# Patient Record
Sex: MALE | Race: WHITE
[De-identification: names, ages, dates, MRNs, and addresses within clinical notes are randomized per-mention and may not be internally consistent; named-entity substitution may affect disease eponyms.]

---

## 2019-12-24 ENCOUNTER — HOSPITAL ENCOUNTER (INPATIENT)
Dept: HOSPITAL 65 - ER | Age: 39
LOS: 3 days | Discharge: HOME | DRG: 286 | End: 2019-12-27
Attending: INTERNAL MEDICINE | Admitting: INTERNAL MEDICINE
Payer: COMMERCIAL

## 2019-12-24 VITALS — SYSTOLIC BLOOD PRESSURE: 105 MMHG | DIASTOLIC BLOOD PRESSURE: 69 MMHG

## 2019-12-24 VITALS — BODY MASS INDEX: 35.15 KG/M2 | WEIGHT: 245.5 LBS | HEIGHT: 70 IN

## 2019-12-24 VITALS — DIASTOLIC BLOOD PRESSURE: 62 MMHG | SYSTOLIC BLOOD PRESSURE: 133 MMHG

## 2019-12-24 VITALS — DIASTOLIC BLOOD PRESSURE: 90 MMHG | SYSTOLIC BLOOD PRESSURE: 119 MMHG

## 2019-12-24 VITALS — SYSTOLIC BLOOD PRESSURE: 127 MMHG | DIASTOLIC BLOOD PRESSURE: 87 MMHG

## 2019-12-24 VITALS — SYSTOLIC BLOOD PRESSURE: 133 MMHG | DIASTOLIC BLOOD PRESSURE: 92 MMHG

## 2019-12-24 VITALS — SYSTOLIC BLOOD PRESSURE: 104 MMHG | DIASTOLIC BLOOD PRESSURE: 70 MMHG

## 2019-12-24 VITALS — DIASTOLIC BLOOD PRESSURE: 70 MMHG | SYSTOLIC BLOOD PRESSURE: 110 MMHG

## 2019-12-24 VITALS — DIASTOLIC BLOOD PRESSURE: 86 MMHG | SYSTOLIC BLOOD PRESSURE: 113 MMHG

## 2019-12-24 VITALS — DIASTOLIC BLOOD PRESSURE: 56 MMHG | SYSTOLIC BLOOD PRESSURE: 120 MMHG

## 2019-12-24 VITALS — SYSTOLIC BLOOD PRESSURE: 149 MMHG | DIASTOLIC BLOOD PRESSURE: 100 MMHG

## 2019-12-24 VITALS — SYSTOLIC BLOOD PRESSURE: 122 MMHG | DIASTOLIC BLOOD PRESSURE: 87 MMHG

## 2019-12-24 VITALS — SYSTOLIC BLOOD PRESSURE: 126 MMHG | DIASTOLIC BLOOD PRESSURE: 81 MMHG

## 2019-12-24 VITALS — SYSTOLIC BLOOD PRESSURE: 117 MMHG | DIASTOLIC BLOOD PRESSURE: 74 MMHG

## 2019-12-24 VITALS — SYSTOLIC BLOOD PRESSURE: 130 MMHG | DIASTOLIC BLOOD PRESSURE: 76 MMHG

## 2019-12-24 VITALS — SYSTOLIC BLOOD PRESSURE: 137 MMHG | DIASTOLIC BLOOD PRESSURE: 93 MMHG

## 2019-12-24 VITALS — SYSTOLIC BLOOD PRESSURE: 106 MMHG | DIASTOLIC BLOOD PRESSURE: 47 MMHG

## 2019-12-24 VITALS — SYSTOLIC BLOOD PRESSURE: 118 MMHG | DIASTOLIC BLOOD PRESSURE: 84 MMHG

## 2019-12-24 VITALS — DIASTOLIC BLOOD PRESSURE: 81 MMHG | SYSTOLIC BLOOD PRESSURE: 163 MMHG

## 2019-12-24 VITALS — DIASTOLIC BLOOD PRESSURE: 92 MMHG | SYSTOLIC BLOOD PRESSURE: 137 MMHG

## 2019-12-24 VITALS — SYSTOLIC BLOOD PRESSURE: 136 MMHG | DIASTOLIC BLOOD PRESSURE: 78 MMHG

## 2019-12-24 VITALS — SYSTOLIC BLOOD PRESSURE: 130 MMHG | DIASTOLIC BLOOD PRESSURE: 68 MMHG

## 2019-12-24 VITALS — DIASTOLIC BLOOD PRESSURE: 66 MMHG | SYSTOLIC BLOOD PRESSURE: 105 MMHG

## 2019-12-24 VITALS — DIASTOLIC BLOOD PRESSURE: 75 MMHG | SYSTOLIC BLOOD PRESSURE: 109 MMHG

## 2019-12-24 VITALS — SYSTOLIC BLOOD PRESSURE: 122 MMHG | DIASTOLIC BLOOD PRESSURE: 85 MMHG

## 2019-12-24 VITALS — DIASTOLIC BLOOD PRESSURE: 67 MMHG | SYSTOLIC BLOOD PRESSURE: 120 MMHG

## 2019-12-24 VITALS — DIASTOLIC BLOOD PRESSURE: 75 MMHG | SYSTOLIC BLOOD PRESSURE: 129 MMHG

## 2019-12-24 VITALS — SYSTOLIC BLOOD PRESSURE: 105 MMHG | DIASTOLIC BLOOD PRESSURE: 66 MMHG

## 2019-12-24 VITALS — SYSTOLIC BLOOD PRESSURE: 123 MMHG | DIASTOLIC BLOOD PRESSURE: 80 MMHG

## 2019-12-24 VITALS — SYSTOLIC BLOOD PRESSURE: 172 MMHG | DIASTOLIC BLOOD PRESSURE: 89 MMHG

## 2019-12-24 DIAGNOSIS — F10.10: ICD-10-CM

## 2019-12-24 DIAGNOSIS — I42.6: ICD-10-CM

## 2019-12-24 DIAGNOSIS — Z79.01: ICD-10-CM

## 2019-12-24 DIAGNOSIS — I48.20: ICD-10-CM

## 2019-12-24 DIAGNOSIS — J18.9: ICD-10-CM

## 2019-12-24 DIAGNOSIS — I25.10: Primary | ICD-10-CM

## 2019-12-24 DIAGNOSIS — E87.6: ICD-10-CM

## 2019-12-24 DIAGNOSIS — I11.0: ICD-10-CM

## 2019-12-24 DIAGNOSIS — I07.1: ICD-10-CM

## 2019-12-24 DIAGNOSIS — Z91.14: ICD-10-CM

## 2019-12-24 DIAGNOSIS — E66.9: ICD-10-CM

## 2019-12-24 DIAGNOSIS — E78.5: ICD-10-CM

## 2019-12-24 DIAGNOSIS — I50.9: ICD-10-CM

## 2019-12-24 DIAGNOSIS — Z82.49: ICD-10-CM

## 2019-12-24 DIAGNOSIS — Z71.41: ICD-10-CM

## 2019-12-24 LAB
ALP INTEST CFR SERPL: 104 U/L (ref 50–136)
ALT SERPL-CCNC: 33 U/L (ref 12–78)
AST SERPL-CCNC: 24 U/L (ref 0–35)
BASOPHILS # BLD AUTO: 0 10^3/UL (ref 0–0.1)
BASOPHILS NFR BLD AUTO: 0.3 % (ref 0–0.2)
CALCIUM SERPL-MCNC: 9 MG/DL (ref 8.4–10.5)
CO2 BLDA-SCNC: 24.7 MMOL/L (ref 20–32)
EOSINOPHIL # BLD AUTO: 0 10^3/UL (ref 0–0.2)
EOSINOPHIL NFR BLD AUTO: 0.1 % (ref 0–5)
ERYTHROCYTE [DISTWIDTH] IN BLOOD BY AUTOMATED COUNT: 15 % (ref 11.5–14.5)
GLUCOSE PRE 100 G GLC PO SERPL-MCNC: 115 MG/DL (ref 70–110)
LYMPHOCYTES # BLD AUTO: 1.7 10^3/UL (ref 1–4.8)
LYMPHOCYTES NFR BLD AUTO: 12.6 % (ref 24–44)
MCH RBC QN AUTO: 32.8 PG (ref 26–34)
MONOCYTES # BLD AUTO: 1.6 10^3/UL (ref 0.3–0.8)
MONOCYTES NFR BLD AUTO: 11.9 % (ref 5–12)
NEUTROPHILS # BLD AUTO: 10.2 10^3/UL (ref 1.8–7.7)
NEUTROPHILS NFR BLD AUTO: 74.8 % (ref 41–85)
PLATELET # BLD AUTO: 212 10^3/UL (ref 150–400)

## 2019-12-24 PROCEDURE — 83735 ASSAY OF MAGNESIUM: CPT

## 2019-12-24 PROCEDURE — 85025 COMPLETE CBC W/AUTO DIFF WBC: CPT

## 2019-12-24 PROCEDURE — C1769 GUIDE WIRE: HCPCS

## 2019-12-24 PROCEDURE — 93306 TTE W/DOPPLER COMPLETE: CPT

## 2019-12-24 PROCEDURE — 93318 ECHO TRANSESOPHAGEAL INTRAOP: CPT

## 2019-12-24 PROCEDURE — 83880 ASSAY OF NATRIURETIC PEPTIDE: CPT

## 2019-12-24 PROCEDURE — 93005 ELECTROCARDIOGRAM TRACING: CPT

## 2019-12-24 PROCEDURE — 82553 CREATINE MB FRACTION: CPT

## 2019-12-24 PROCEDURE — 85379 FIBRIN DEGRADATION QUANT: CPT

## 2019-12-24 PROCEDURE — C1894 INTRO/SHEATH, NON-LASER: HCPCS

## 2019-12-24 PROCEDURE — 85027 COMPLETE CBC AUTOMATED: CPT

## 2019-12-24 PROCEDURE — 99152 MOD SED SAME PHYS/QHP 5/>YRS: CPT

## 2019-12-24 PROCEDURE — 71275 CT ANGIOGRAPHY CHEST: CPT

## 2019-12-24 PROCEDURE — 85730 THROMBOPLASTIN TIME PARTIAL: CPT

## 2019-12-24 PROCEDURE — 86677 HELICOBACTER PYLORI ANTIBODY: CPT

## 2019-12-24 PROCEDURE — 80053 COMPREHEN METABOLIC PANEL: CPT

## 2019-12-24 PROCEDURE — 84484 ASSAY OF TROPONIN QUANT: CPT

## 2019-12-24 PROCEDURE — 85610 PROTHROMBIN TIME: CPT

## 2019-12-24 PROCEDURE — 82550 ASSAY OF CK (CPK): CPT

## 2019-12-24 PROCEDURE — 99153 MOD SED SAME PHYS/QHP EA: CPT

## 2019-12-24 PROCEDURE — 36415 COLL VENOUS BLD VENIPUNCTURE: CPT

## 2019-12-24 PROCEDURE — 80048 BASIC METABOLIC PNL TOTAL CA: CPT

## 2019-12-24 PROCEDURE — 99285 EMERGENCY DEPT VISIT HI MDM: CPT

## 2019-12-24 PROCEDURE — 71045 X-RAY EXAM CHEST 1 VIEW: CPT

## 2019-12-24 PROCEDURE — 93458 L HRT ARTERY/VENTRICLE ANGIO: CPT

## 2019-12-24 PROCEDURE — C1887 CATHETER, GUIDING: HCPCS

## 2019-12-24 PROCEDURE — 87040 BLOOD CULTURE FOR BACTERIA: CPT

## 2019-12-24 RX ADMIN — CEFTRIAXONE SODIUM SCH MLS/HR: 1 INJECTION, POWDER, FOR SOLUTION INTRAMUSCULAR; INTRAVENOUS at 16:55

## 2019-12-24 NOTE — DIREP
PROCEDURE:CHEST 1 VIEW

 

COMPARISON:[facility], CR, XRAY CHEST SINGLE VW, 12/17/2016, 

09:18 AM.

 

INDICATIONS:hemoptysis, dyspnea

 

FINDINGS:

LUNGS/PLEURA:A right pleural effusion is present.  The left lung is clear.  No 

focal consolidation or pneumothorax otherwise noted.

VASCULATURE:Normal.  Unremarkable pulmonary vasculature.

CARDIAC:Borderline cardiomegaly.

MEDIASTINUM:Normal.  No visible mass or adenopathy. 

BONES:Mild degenerative change.

OTHER:Cardiac leads.

 

CONCLUSION:Right pleural effusion.

 

 

 

Dictated by: TANIKA Mccracken M.D. on 12/24/2019 at 01:06 PM     

Electronically Signed By: TANIKA Mccracken M.D. on 12/24/2019 at 01:07 PM

## 2019-12-24 NOTE — NUR
ARRIVAL

PATIENT ARRIVED TO UNIT VIA STRETCHER ON ROOM AIR AND CONNECTED TO PORTABLE TELEMONITOR. 
CARDIZEM DRIP AT 15MG/HR TO 20 GA TO RIGHT AC. PATIENT TRANSFERRED SELF INTO ICU BED. NO 
DISTRESS NOTED. PATIENT CONNECTED TO BEDSIDE MINDRAY, TELEMONITOR READING A FIB RATE . 
RALES HEARD THROUGHOUT LUNG FIELDS. PATIENT STATED COUGHING UP THICK SPUTUM.. DENIES NO 
CHEST PAIN. PATIENT ORIENTED TO ICU ROOM, ICU BED, AND CALL LIGHT. PATIENT INSTRUCTED TO 
CALL FOR ASSISTANCE WHEN UP TO AVOID TRIPPING. ADMISSION PACKET COMPLETED.

## 2019-12-24 NOTE — NUR
UPDATE

PATIENTS CARDIZEM INFUSING AT TITRATED UP TO 15MG/HR AT THIS TIME CONTINUES TO 
HAVE TACHYCARDIA WITH HR .  DR. KIMBROUGH NOTIFIED.  PATIENT DENIES ANY 
CHEST PAIN AT THIS TIME AND IS FEELING A LITTLE BETTER THAN WHEN HE CAME TO THE 
ER.

## 2019-12-24 NOTE — PCM.HP
HISTORY & PHYSICAL


HISTORY & PHYSICAL


DATE OF ADMISSION: 12/24/2019





CHIEF COMPLAINT: chest pain, sob





HISTORY OF PRESENT ILLNESS: 40 y/o male hx of A fib, presents with 1-2 days of 

worsening R lateral chest and back pain. 10/10, worse with deep breath, and 

fever/chills. Pt has not been taking medications because he says he can't afford

it (lost health insurance).








ALLERGIES: NKA





CURRENT MEDICATIONS: not taking them





PAST MEDICAL HISTORY: A fib





SOCIAL HISTORY: denies drugs, +etoh, +tobacco





FAMILY HISTORY: non contributory





REVIEW OF SYSTEMS: +fever, chills, and nausea. negative for all 11 point system 

except HPI.





PHYSICAL EXAMINATION: 





GENERAL: NAD, alert, oriented





VITAL SIGNS: 154/97 128 96% 16 afebrile





HEENT: normocephalic, thyroid smooth





LUNGS: decreased sounds on R base, no wheezes, symmetric excursions, non labored





HEART: irregular, no murmur





ABDOMEN: soft, obese, nontender, BS+





EXTREMITIES: no edema, or clubbing





NEUROLOGIC: CN II-XII intact, ENID





LABORATORY DATA: 














Test


 12/24/19


12:50


 


White Blood Count


 13.6 10^3/uL


(4.5-11.0)


 


Red Blood Count


 5.40 10^6/uL


(4.50-5.90)


 


Hemoglobin


 17.7 g/dL


(13.9-16.3)


 


Hematocrit


 53.0 %


(37.0-53.0)


 


Mean Corpuscular Volume


 98.1 fL


()


 


Mean Corpuscular Hemoglobin


 32.8 pg


(26-34)


 


Mean Corpuscular Hemoglobin


Concent 33.4 g/dL


(33-37)


 


Red Cell Distribution Width


 15.0 %


(11.5-14.5)


 


Platelet Count


 212 10^3/uL


(150-400)


 


Mean Platelet Volume


 9.5 fL


(7.8-11.0)


 


Neutrophils (%) (Auto)


 74.8 %


(41.0-85.0)


 


Lymphocytes (%) (Auto)


 12.6 %


(24.0-44.0)


 


Monocytes (%) (Auto)


 11.9 %


(5.0-12.0)


 


Neutrophils # (Auto)


 10.2 10^3/uL


(1.8-7.7)


 


Lymphocytes # (Auto)


 1.7 10^3/uL


(1.0-4.8)


 


Monocytes # (Auto)


 1.6 10^3/uL


(0.3-0.8)


 


Absolute Immature Granulocyte


(auto 0.04 10^3 u/L


(0-2)


 


Immature Granulocytes %


 0.30 %


(0.00-0.50)


 


Eosinophils %


 0.1 %


(0.0-5.0)


 


Basophils %


 0.3 %


(0.0-0.2)


 


Basophils #


 0.0 10^3/uL


(0.0-0.1)


 


Eosinophil Count


 0.0 10^3/uL


(0.0-0.2)


 


Prothrombin Time


 12.2 SEC


(9.4-11.5)


 


Prothrombin Time INR


(Non-Therap) 1.2 





 


Activated Partial


Thromboplast Time 27.1 SEC


(24.67-30.72)


 


D-Dimer


 1.92 mg/L


(0.19-0.49)


 


Sodium Level


 135 mmol/L


(132-145)


 


Potassium Level


 3.9 mmol/L


(3.6-5.2)


 


Chloride Level


 98.0 mmol/L


()


 


Carbon Dioxide Level


 24.7 mmol/L


(20.0-32)


 


Anion Gap 16.2 


 


Blood Urea Nitrogen 8 mg/dL (7-18) 


 


Creatinine


 1.20 mg/dL


(0.59-1.40)


 


Estimated GFR (


American) 81.6 (>/=60) 





 


BUN/Creatinine Ratio 6.0 


 


Glucose Level


 115 mg/dL


()


 


Calcium Level


 9.0 mg/dL


(8.4-10.5)


 


Total Bilirubin


 2.7 mg/dL


(0.2-1.0)


 


Aspartate Amino Transf


(AST/SGOT) 24 U/L (0-35) 





 


Alanine Aminotransferase


(ALT/SGPT) 33 U/L (12-78) 





 


Alkaline Phosphatase


 104 U/L


()


 


Total Creatine Kinase


 18 U/L


()


 


Creatine Kinase MB


 < 0.5 ng/mL


(0.5-3.6)


 


Troponin I


 < 0.02 ng/mL


(0.00-0.05)


 


Pro-B-Type Natriuretic Peptide


 6401 pg/mL


(0-125)


 


Total Protein


 7.2 g/dL


(6.4-8.2)


 


Albumin


 3.0 g/dL


(3.4-5.0)


 


Globulin 4.2 


 


Helicobacter pylori Screen


 NEGATIVE


(NEGATIVE)








IMPRESSION & PLAN:


 1) CAP - Rocephin & Zithromax


 2) A fib w/RVR - cardizem drip


3) DVT prophylaxis - SCDs











JOSR TROTTER DO               Dec 24, 2019 18:23

## 2019-12-24 NOTE — ER.PDOC
General


Chief Complaint:  Chest Pain-Cardiac Nature


Stated Complaint:  CHEST PAIN, FEVER, COUGH,


Time seen by MD:  16:00


Source:  patient





History of Present Illness


Initial Comments


H/O AFIB, NON COMPLIANT WITH MEDS


Timing/Duration:  24 hours


Severity/Quality:  moderate


Radiation:  no radiation


Activities at Onset:  emotional stress


Modifying Factors:  coughing, exercise


Nitro Today/Relief:  No Nitro Taken Today


Associated Symptoms:  cough, palpitations, shortness of breath


Prior symptoms/Treatment:  Similar symptoms previous


Allergies:  


Coded Allergies:  


     No Known Allergies (Unverified , 12/17/16)


Home Meds


Active Scripts


Aspirin (ASPIRIN EC) 81 Mg Tablet., 81 MG PO DAILY for 30 Days


   Prov:PEDRO LUIS ROBERSON MD         5/30/17


[Magnesium Oxide] 400 MG TABLET No Conflict Check, 400 MG PO BID for 30 Days


   Prov:PEDRO LUIS ROBERSON MD         5/30/17





Past Medical History


Medical History:  hypertension, other


Surgical History:  cardiac cath





Social History


Alcohol Use:  heavy


Drug Use:  none





Reviewed


Nursing Reviewed:  Vital Signs, Abn. Noted





All Other Systems:  Reviewed and Negative





Physical Exam


General Appearance:  No Apparent Distress, WD/WN


HEENT:  PERRL/EOMI, Normal ENT Inspection, TMs Normal, Pharynx Normal


Neck:  Non-Tender, Full Range of Motion, Supple, Normal Inspection


Respiratory:  rales


Cardiovascular:  Tachycardia, Irregularly Irregular


Gastrointestinal:  Normal Bowel Sounds, No Organomegaly, No Pulsatile Mass, Non 

Tender, Soft


Extremities:  Normal Range of Motion, Non-Tender, Normal Inspection, No Pedal 

Edema, No Calf Tenderness, Normal Capillary Refill


Neurologic/Psychiatric:  CNs II-XII NML as Tested, No Motor/Sensory Deficits, 

Alert, Normal Mood/Affect, Oriented x 3


Skin:  Normal Color, Warm/Dry





Results/Orders


Results/Orders





Orders - CINDY KIMBROUGH MD


Diltiazem Hcl (Cardizem) (12/24/19 12:50)


Diltiazem Hcl (Cardizem) (12/24/19 12:50)


Cbc With Auto Diff (12/24/19 12:52)


Comprehensive Metabolic Panel (12/24/19 12:52)


Creatine Kinase (12/24/19 12:52)


Creatine Kinase Mb (12/24/19 12:52)


Troponin I (12/24/19 12:52)


Probnp    B-Type Np (12/24/19 12:52)


PT (12/24/19 12:52)


Partial Thromboplastin Time. (12/24/19 12:52)


Helicobacter Pylori (12/24/19 12:52)


D-Dimer (12/24/19 12:52)


Xr Chest 1v (12/24/19 12:52)


Ekg-Routine (12/24/19 12:52)


Diltiazem Hcl (Cardizem) (12/24/19 12:53)


Diltiazem Hcl (Cardizem) (12/24/19 12:53)


Diltiazem Hcl (Cardizem) (12/24/19 13:14)


Diltiazem Hcl (Cardizem) (12/24/19 13:20)


Cta Chest (12/24/19 13:40)


Diltiazem Hcl (Cardizem) (12/24/19 13:48)


0.9 % Sodium Chloride (Ns 100ml) (12/24/19 13:49)


Diltiazem Hcl (Cardizem) (12/24/19 14:09)


Blood Culture (12/24/19 15:58)


Ceftriaxone Sodium (Rocephin) (12/24/19 16:00)





Vital Signs








  Date Time  Temp Pulse Resp B/P (MAP) Pulse Ox O2 Delivery O2 Flow Rate FiO2


 


12/24/19 14:10  136      


 


12/24/19 13:28  122 20 130/76 (94) 96 Room Air  


 


12/24/19 13:20  155      


 


12/24/19 13:10  148 20 129/75 (93) 97 Room Air  


 


12/24/19 13:00  171  172/89    


 


12/24/19 13:00  171      


 


12/24/19 12:50  187 18  98 Room Air  


 


12/24/19 12:31 99.3 60 18     


 


12/24/19 12:31 99.3 60 18 172/89 (116) 97 Room Air  


 


12/24/19 12:31 99.3 60 18  97   








Administered Medications








 Medications


  (Trade)  Dose


 Ordered  Sig/Foreign


 Route


 PRN Reason  Start Time


 Stop Time Status Last Admin


Dose Admin


 


 Diltiazem HCl


  (Cardizem)  20 mg  STAT  STAT


 IV


   12/24/19 12:53


 12/24/19 12:55 DC 12/24/19 13:00


20 MG


 


 Diltiazem HCl


  (Cardizem)  25 mg  STAT  STAT


 IV


   12/24/19 13:20


 12/24/19 13:30 DC 12/24/19 13:20


25 MG


 


 Diltiazem HCl


  (Cardizem)  30 mg  STAT  STAT


 PO


   12/24/19 12:53


 12/24/19 12:55 DC 12/24/19 13:00


30 MG


 


 Diltiazem HCl


  (Cardizem)  125 mg  STAT  STAT


 IV


   12/24/19 14:09


 12/24/19 14:11 DC 12/24/19 14:10


125 MG








                                Laboratory Tests








Test


 12/24/19


12:50


 


White Blood Count


 13.6 10^3/uL


(4.5-11.0)  H


 


Red Blood Count


 5.40 10^6/uL


(4.50-5.90)


 


Hemoglobin


 17.7 g/dL


(13.9-16.3)  H


 


Hematocrit


 53.0 %


(37.0-53.0)


 


Mean Corpuscular Volume


 98.1 fL


()


 


Mean Corpuscular Hemoglobin


 32.8 pg


(26-34)


 


Mean Corpuscular Hemoglobin


Concent 33.4 g/dL


(33-37)


 


Red Cell Distribution Width


 15.0 %


(11.5-14.5)  H


 


Platelet Count


 212 10^3/uL


(150-400)


 


Mean Platelet Volume


 9.5 fL


(7.8-11.0)


 


Neutrophils (%) (Auto)


 74.8 %


(41.0-85.0)


 


Lymphocytes (%) (Auto)


 12.6 %


(24.0-44.0)  L


 


Monocytes (%) (Auto)


 11.9 %


(5.0-12.0)


 


Neutrophils # (Auto)


 10.2 10^3/uL


(1.8-7.7)  H


 


Lymphocytes # (Auto)


 1.7 10^3/uL


(1.0-4.8)


 


Monocytes # (Auto)


 1.6 10^3/uL


(0.3-0.8)  H


 


Absolute Immature Granulocyte


(auto 0.04 10^3 u/L


(0-2)


 


Immature Granulocytes %


 0.30 %


(0.00-0.50)


 


Eosinophils %


 0.1 %


(0.0-5.0)


 


Basophils %


 0.3 %


(0.0-0.2)  H


 


Basophils #


 0.0 10^3/uL


(0.0-0.1)


 


Eosinophil Count


 0.0 10^3/uL


(0.0-0.2)


 


Prothrombin Time


 12.2 SEC


(9.4-11.5)  H


 


Prothrombin Time INR


(Non-Therap) 1.2  





 


Activated Partial


Thromboplast Time 27.1 SEC


(24.67-30.72)


 


D-Dimer


 1.92 mg/L


(0.19-0.49)  *H


 


Sodium Level


 135 mmol/L


(132-145)


 


Potassium Level


 3.9 mmol/L


(3.6-5.2)


 


Chloride Level


 98.0 mmol/L


()


 


Carbon Dioxide Level


 24.7 mmol/L


(20.0-32)


 


Anion Gap 16.2  


 


Blood Urea Nitrogen


 8 mg/dL (7-18)





 


Creatinine


 1.20 mg/dL


(0.59-1.40)


 


Estimated GFR (


American) 81.6 (>/=60)  





 


BUN/Creatinine Ratio 6.0  


 


Glucose Level


 115 mg/dL


()  H


 


Calcium Level


 9.0 mg/dL


(8.4-10.5)


 


Total Bilirubin


 2.7 mg/dL


(0.2-1.0)  H


 


Aspartate Amino Transferase


(AST) 24 U/L (0-35)  





 


Alanine Aminotransferase (ALT)


 33 U/L (12-78)





 


Alkaline Phosphatase


 104 U/L


()


 


Total Creatine Kinase


 18 U/L


()  L


 


Creatine Kinase MB


 < 0.5 ng/mL


(0.5-3.6)  L


 


Troponin I


 < 0.02 ng/mL


(0.00-0.05)


 


Pro-B-Type Natriuretic Peptide


 6401 pg/mL


(0-125)  H


 


Total Protein


 7.2 g/dL


(6.4-8.2)


 


Albumin


 3.0 g/dL


(3.4-5.0)  L


 


Globulin 4.2  


 


Helicobacter pylori Screen


 NEGATIVE


(NEGATIVE)











EKG/XRAY/CT/US


EKG Comments:  AFIB, RVR





Consult/PCP


Time Consult/PCP Called:  14:55


Consult/PCP:  DR TROTTER





Departure


Time of Disposition:  17:00


Disposition:  09 ADMITTED AS INPATIENT


Impression:  


   Primary Impression:  


   A-fib


   Additional Impression:  


   Pneumonia


Condition:  Improved


Referrals:  


PCP,UNKNOWN (PCP)


PRIMARY CARE PROVIDER


Duration or Time Spent with Pa:  20m





Problem Qualifiers











LUIS E,CINDY S MD              Dec 24, 2019 16:32

## 2019-12-24 NOTE — DIREP
PROCEDURE:CT PULMONARY ANGIOGRAM

 

TECHNIQUE:Following the intravenous administration of contrast material, axial 

cuts were obtained through the chest.  Sagittal and coronal MIP post-processing 

reconstructions are provided.  Satisfactory pulmonary arterial contrast 

opacification was achieved.  The images were viewed at lung and soft tissue 

settings.   The study was reviewed on abdominal, lung, liver and bone windows 

PICC

 

COMPARISON:[facility], , XRAY CHEST SINGLE VW, 12/24/2019, 

12:44 PM.

 

INDICATIONS:R/O PE, DYSPNEA, NEW ONSET AFIB

 

FINDINGS:

PULMONARY ARTERIES:No evidence for pulmonary embolism is seen.  The CT 

venogram was not performed.

LUNGS:Extensive right lower lobe pneumonia with a moderate-sized right-sided 

pleural effusion noted.

CARDIAC:Normal size heart and normal pulmonary vascularity. 

THYROID:Normal.

THORACIC AORTA:No aortic aneurysm or dissection is seen.  The opacifications 

of aorta is slightly less than optimal.

MEDIASTINUM:Small lymph nodes identified in the right hilum, and in the 

para-aortic region.

PLEURA:Normal.

BONES:Mild degenerative changes in the spine.

OTHER:No additional findings. 

 

CONCLUSION:

 

No evidence for pulmonary embolism.  The CT venogram was not performed.

 

No aortic aneurysm or dissection is seen.  The opacifications of aorta is 

slightly less than optimal.

 

Extensive right lower lobe pneumonia with a moderate right-sided effusion and 

lymph nodes identified in the right hilum, para-aortic region.

 

 

 

 

 

Dictated by: Khris Phoenix MD on 12/24/2019 at 02:15 PM     

Electronically Signed By: Khris Phoenix MD on 12/24/2019 at 02:26 PM

## 2019-12-25 VITALS — SYSTOLIC BLOOD PRESSURE: 104 MMHG | DIASTOLIC BLOOD PRESSURE: 54 MMHG

## 2019-12-25 VITALS — SYSTOLIC BLOOD PRESSURE: 130 MMHG | DIASTOLIC BLOOD PRESSURE: 56 MMHG

## 2019-12-25 VITALS — DIASTOLIC BLOOD PRESSURE: 80 MMHG | SYSTOLIC BLOOD PRESSURE: 121 MMHG

## 2019-12-25 VITALS — SYSTOLIC BLOOD PRESSURE: 112 MMHG | DIASTOLIC BLOOD PRESSURE: 72 MMHG

## 2019-12-25 VITALS — DIASTOLIC BLOOD PRESSURE: 86 MMHG | SYSTOLIC BLOOD PRESSURE: 136 MMHG

## 2019-12-25 VITALS — DIASTOLIC BLOOD PRESSURE: 102 MMHG | SYSTOLIC BLOOD PRESSURE: 121 MMHG

## 2019-12-25 VITALS — DIASTOLIC BLOOD PRESSURE: 76 MMHG | SYSTOLIC BLOOD PRESSURE: 129 MMHG

## 2019-12-25 VITALS — SYSTOLIC BLOOD PRESSURE: 145 MMHG | DIASTOLIC BLOOD PRESSURE: 79 MMHG

## 2019-12-25 VITALS — SYSTOLIC BLOOD PRESSURE: 137 MMHG | DIASTOLIC BLOOD PRESSURE: 93 MMHG

## 2019-12-25 VITALS — DIASTOLIC BLOOD PRESSURE: 98 MMHG | SYSTOLIC BLOOD PRESSURE: 131 MMHG

## 2019-12-25 VITALS — SYSTOLIC BLOOD PRESSURE: 141 MMHG | DIASTOLIC BLOOD PRESSURE: 86 MMHG

## 2019-12-25 VITALS — DIASTOLIC BLOOD PRESSURE: 76 MMHG | SYSTOLIC BLOOD PRESSURE: 140 MMHG

## 2019-12-25 VITALS — SYSTOLIC BLOOD PRESSURE: 154 MMHG | DIASTOLIC BLOOD PRESSURE: 86 MMHG

## 2019-12-25 VITALS — DIASTOLIC BLOOD PRESSURE: 85 MMHG | SYSTOLIC BLOOD PRESSURE: 124 MMHG

## 2019-12-25 VITALS — SYSTOLIC BLOOD PRESSURE: 125 MMHG | DIASTOLIC BLOOD PRESSURE: 79 MMHG

## 2019-12-25 VITALS — DIASTOLIC BLOOD PRESSURE: 105 MMHG | SYSTOLIC BLOOD PRESSURE: 146 MMHG

## 2019-12-25 VITALS — SYSTOLIC BLOOD PRESSURE: 144 MMHG | DIASTOLIC BLOOD PRESSURE: 100 MMHG

## 2019-12-25 VITALS — DIASTOLIC BLOOD PRESSURE: 85 MMHG | SYSTOLIC BLOOD PRESSURE: 134 MMHG

## 2019-12-25 VITALS — DIASTOLIC BLOOD PRESSURE: 93 MMHG | SYSTOLIC BLOOD PRESSURE: 137 MMHG

## 2019-12-25 VITALS — SYSTOLIC BLOOD PRESSURE: 154 MMHG | DIASTOLIC BLOOD PRESSURE: 71 MMHG

## 2019-12-25 VITALS — SYSTOLIC BLOOD PRESSURE: 128 MMHG | DIASTOLIC BLOOD PRESSURE: 82 MMHG

## 2019-12-25 VITALS — SYSTOLIC BLOOD PRESSURE: 131 MMHG | DIASTOLIC BLOOD PRESSURE: 73 MMHG

## 2019-12-25 VITALS — DIASTOLIC BLOOD PRESSURE: 78 MMHG | SYSTOLIC BLOOD PRESSURE: 123 MMHG

## 2019-12-25 VITALS — SYSTOLIC BLOOD PRESSURE: 147 MMHG | DIASTOLIC BLOOD PRESSURE: 105 MMHG

## 2019-12-25 VITALS — SYSTOLIC BLOOD PRESSURE: 149 MMHG | DIASTOLIC BLOOD PRESSURE: 97 MMHG

## 2019-12-25 VITALS — SYSTOLIC BLOOD PRESSURE: 96 MMHG | DIASTOLIC BLOOD PRESSURE: 65 MMHG

## 2019-12-25 VITALS — DIASTOLIC BLOOD PRESSURE: 116 MMHG | SYSTOLIC BLOOD PRESSURE: 169 MMHG

## 2019-12-25 VITALS — DIASTOLIC BLOOD PRESSURE: 88 MMHG | SYSTOLIC BLOOD PRESSURE: 149 MMHG

## 2019-12-25 VITALS — DIASTOLIC BLOOD PRESSURE: 94 MMHG | SYSTOLIC BLOOD PRESSURE: 146 MMHG

## 2019-12-25 VITALS — SYSTOLIC BLOOD PRESSURE: 144 MMHG | DIASTOLIC BLOOD PRESSURE: 82 MMHG

## 2019-12-25 VITALS — SYSTOLIC BLOOD PRESSURE: 135 MMHG | DIASTOLIC BLOOD PRESSURE: 91 MMHG

## 2019-12-25 VITALS — SYSTOLIC BLOOD PRESSURE: 124 MMHG | DIASTOLIC BLOOD PRESSURE: 73 MMHG

## 2019-12-25 VITALS — DIASTOLIC BLOOD PRESSURE: 83 MMHG | SYSTOLIC BLOOD PRESSURE: 133 MMHG

## 2019-12-25 VITALS — DIASTOLIC BLOOD PRESSURE: 86 MMHG | SYSTOLIC BLOOD PRESSURE: 135 MMHG

## 2019-12-25 VITALS — DIASTOLIC BLOOD PRESSURE: 83 MMHG | SYSTOLIC BLOOD PRESSURE: 138 MMHG

## 2019-12-25 VITALS — DIASTOLIC BLOOD PRESSURE: 97 MMHG | SYSTOLIC BLOOD PRESSURE: 159 MMHG

## 2019-12-25 VITALS — DIASTOLIC BLOOD PRESSURE: 81 MMHG | SYSTOLIC BLOOD PRESSURE: 120 MMHG

## 2019-12-25 VITALS — DIASTOLIC BLOOD PRESSURE: 81 MMHG | SYSTOLIC BLOOD PRESSURE: 133 MMHG

## 2019-12-25 VITALS — SYSTOLIC BLOOD PRESSURE: 141 MMHG | DIASTOLIC BLOOD PRESSURE: 66 MMHG

## 2019-12-25 VITALS — DIASTOLIC BLOOD PRESSURE: 96 MMHG | SYSTOLIC BLOOD PRESSURE: 133 MMHG

## 2019-12-25 VITALS — DIASTOLIC BLOOD PRESSURE: 86 MMHG | SYSTOLIC BLOOD PRESSURE: 142 MMHG

## 2019-12-25 VITALS — DIASTOLIC BLOOD PRESSURE: 52 MMHG | SYSTOLIC BLOOD PRESSURE: 147 MMHG

## 2019-12-25 VITALS — DIASTOLIC BLOOD PRESSURE: 58 MMHG | SYSTOLIC BLOOD PRESSURE: 161 MMHG

## 2019-12-25 VITALS — SYSTOLIC BLOOD PRESSURE: 123 MMHG | DIASTOLIC BLOOD PRESSURE: 87 MMHG

## 2019-12-25 VITALS — DIASTOLIC BLOOD PRESSURE: 50 MMHG | SYSTOLIC BLOOD PRESSURE: 132 MMHG

## 2019-12-25 VITALS — DIASTOLIC BLOOD PRESSURE: 97 MMHG | SYSTOLIC BLOOD PRESSURE: 152 MMHG

## 2019-12-25 VITALS — SYSTOLIC BLOOD PRESSURE: 132 MMHG | DIASTOLIC BLOOD PRESSURE: 68 MMHG

## 2019-12-25 VITALS — DIASTOLIC BLOOD PRESSURE: 59 MMHG | SYSTOLIC BLOOD PRESSURE: 132 MMHG

## 2019-12-25 VITALS — DIASTOLIC BLOOD PRESSURE: 91 MMHG | SYSTOLIC BLOOD PRESSURE: 134 MMHG

## 2019-12-25 VITALS — DIASTOLIC BLOOD PRESSURE: 88 MMHG | SYSTOLIC BLOOD PRESSURE: 133 MMHG

## 2019-12-25 VITALS — SYSTOLIC BLOOD PRESSURE: 146 MMHG | DIASTOLIC BLOOD PRESSURE: 85 MMHG

## 2019-12-25 VITALS — SYSTOLIC BLOOD PRESSURE: 147 MMHG | DIASTOLIC BLOOD PRESSURE: 78 MMHG

## 2019-12-25 VITALS — SYSTOLIC BLOOD PRESSURE: 127 MMHG | DIASTOLIC BLOOD PRESSURE: 81 MMHG

## 2019-12-25 VITALS — DIASTOLIC BLOOD PRESSURE: 77 MMHG | SYSTOLIC BLOOD PRESSURE: 125 MMHG

## 2019-12-25 VITALS — DIASTOLIC BLOOD PRESSURE: 46 MMHG | SYSTOLIC BLOOD PRESSURE: 114 MMHG

## 2019-12-25 VITALS — SYSTOLIC BLOOD PRESSURE: 118 MMHG | DIASTOLIC BLOOD PRESSURE: 63 MMHG

## 2019-12-25 VITALS — SYSTOLIC BLOOD PRESSURE: 136 MMHG | DIASTOLIC BLOOD PRESSURE: 82 MMHG

## 2019-12-25 VITALS — DIASTOLIC BLOOD PRESSURE: 100 MMHG | SYSTOLIC BLOOD PRESSURE: 121 MMHG

## 2019-12-25 VITALS — DIASTOLIC BLOOD PRESSURE: 93 MMHG | SYSTOLIC BLOOD PRESSURE: 127 MMHG

## 2019-12-25 VITALS — DIASTOLIC BLOOD PRESSURE: 81 MMHG | SYSTOLIC BLOOD PRESSURE: 134 MMHG

## 2019-12-25 VITALS — SYSTOLIC BLOOD PRESSURE: 108 MMHG | DIASTOLIC BLOOD PRESSURE: 70 MMHG

## 2019-12-25 VITALS — DIASTOLIC BLOOD PRESSURE: 72 MMHG | SYSTOLIC BLOOD PRESSURE: 142 MMHG

## 2019-12-25 VITALS — DIASTOLIC BLOOD PRESSURE: 90 MMHG | SYSTOLIC BLOOD PRESSURE: 146 MMHG

## 2019-12-25 VITALS — SYSTOLIC BLOOD PRESSURE: 142 MMHG | DIASTOLIC BLOOD PRESSURE: 108 MMHG

## 2019-12-25 VITALS — DIASTOLIC BLOOD PRESSURE: 92 MMHG | SYSTOLIC BLOOD PRESSURE: 148 MMHG

## 2019-12-25 VITALS — DIASTOLIC BLOOD PRESSURE: 67 MMHG | SYSTOLIC BLOOD PRESSURE: 118 MMHG

## 2019-12-25 VITALS — DIASTOLIC BLOOD PRESSURE: 95 MMHG | SYSTOLIC BLOOD PRESSURE: 171 MMHG

## 2019-12-25 VITALS — DIASTOLIC BLOOD PRESSURE: 85 MMHG | SYSTOLIC BLOOD PRESSURE: 129 MMHG

## 2019-12-25 VITALS — SYSTOLIC BLOOD PRESSURE: 147 MMHG | DIASTOLIC BLOOD PRESSURE: 76 MMHG

## 2019-12-25 VITALS — SYSTOLIC BLOOD PRESSURE: 130 MMHG | DIASTOLIC BLOOD PRESSURE: 78 MMHG

## 2019-12-25 VITALS — DIASTOLIC BLOOD PRESSURE: 96 MMHG | SYSTOLIC BLOOD PRESSURE: 135 MMHG

## 2019-12-25 VITALS — SYSTOLIC BLOOD PRESSURE: 125 MMHG | DIASTOLIC BLOOD PRESSURE: 81 MMHG

## 2019-12-25 VITALS — SYSTOLIC BLOOD PRESSURE: 146 MMHG | DIASTOLIC BLOOD PRESSURE: 80 MMHG

## 2019-12-25 VITALS — DIASTOLIC BLOOD PRESSURE: 85 MMHG | SYSTOLIC BLOOD PRESSURE: 139 MMHG

## 2019-12-25 VITALS — SYSTOLIC BLOOD PRESSURE: 129 MMHG | DIASTOLIC BLOOD PRESSURE: 95 MMHG

## 2019-12-25 VITALS — DIASTOLIC BLOOD PRESSURE: 57 MMHG | SYSTOLIC BLOOD PRESSURE: 148 MMHG

## 2019-12-25 VITALS — DIASTOLIC BLOOD PRESSURE: 84 MMHG | SYSTOLIC BLOOD PRESSURE: 121 MMHG

## 2019-12-25 VITALS — SYSTOLIC BLOOD PRESSURE: 133 MMHG | DIASTOLIC BLOOD PRESSURE: 89 MMHG

## 2019-12-25 VITALS — DIASTOLIC BLOOD PRESSURE: 88 MMHG | SYSTOLIC BLOOD PRESSURE: 132 MMHG

## 2019-12-25 VITALS — DIASTOLIC BLOOD PRESSURE: 79 MMHG | SYSTOLIC BLOOD PRESSURE: 137 MMHG

## 2019-12-25 VITALS — SYSTOLIC BLOOD PRESSURE: 124 MMHG | DIASTOLIC BLOOD PRESSURE: 84 MMHG

## 2019-12-25 VITALS — DIASTOLIC BLOOD PRESSURE: 73 MMHG | SYSTOLIC BLOOD PRESSURE: 129 MMHG

## 2019-12-25 VITALS — DIASTOLIC BLOOD PRESSURE: 103 MMHG | SYSTOLIC BLOOD PRESSURE: 158 MMHG

## 2019-12-25 VITALS — DIASTOLIC BLOOD PRESSURE: 78 MMHG | SYSTOLIC BLOOD PRESSURE: 131 MMHG

## 2019-12-25 VITALS — SYSTOLIC BLOOD PRESSURE: 127 MMHG | DIASTOLIC BLOOD PRESSURE: 98 MMHG

## 2019-12-25 VITALS — SYSTOLIC BLOOD PRESSURE: 147 MMHG | DIASTOLIC BLOOD PRESSURE: 87 MMHG

## 2019-12-25 VITALS — SYSTOLIC BLOOD PRESSURE: 146 MMHG | DIASTOLIC BLOOD PRESSURE: 107 MMHG

## 2019-12-25 VITALS — DIASTOLIC BLOOD PRESSURE: 87 MMHG | SYSTOLIC BLOOD PRESSURE: 134 MMHG

## 2019-12-25 VITALS — SYSTOLIC BLOOD PRESSURE: 132 MMHG | DIASTOLIC BLOOD PRESSURE: 89 MMHG

## 2019-12-25 VITALS — SYSTOLIC BLOOD PRESSURE: 128 MMHG | DIASTOLIC BLOOD PRESSURE: 84 MMHG

## 2019-12-25 VITALS — SYSTOLIC BLOOD PRESSURE: 135 MMHG | DIASTOLIC BLOOD PRESSURE: 85 MMHG

## 2019-12-25 VITALS — SYSTOLIC BLOOD PRESSURE: 146 MMHG | DIASTOLIC BLOOD PRESSURE: 90 MMHG

## 2019-12-25 LAB
BASOPHILS # BLD AUTO: 0 10^3/UL (ref 0–0.1)
BASOPHILS NFR BLD AUTO: 0.3 % (ref 0–0.2)
CALCIUM SERPL-MCNC: 8.7 MG/DL (ref 8.4–10.5)
CALCIUM SERPL-MCNC: 9.2 MG/DL (ref 8.4–10.5)
CO2 BLDA-SCNC: 22.3 MMOL/L (ref 20–32)
CO2 BLDA-SCNC: 26.2 MMOL/L (ref 20–32)
EOSINOPHIL # BLD AUTO: 0 10^3/UL (ref 0–0.2)
EOSINOPHIL NFR BLD AUTO: 0.1 % (ref 0–5)
ERYTHROCYTE [DISTWIDTH] IN BLOOD BY AUTOMATED COUNT: 14.9 % (ref 11.5–14.5)
ERYTHROCYTE [DISTWIDTH] IN BLOOD BY AUTOMATED COUNT: 15 % (ref 11.5–14.5)
GLUCOSE PRE 100 G GLC PO SERPL-MCNC: 111 MG/DL (ref 70–110)
GLUCOSE PRE 100 G GLC PO SERPL-MCNC: 134 MG/DL (ref 70–110)
LYMPHOCYTES # BLD AUTO: 1.3 10^3/UL (ref 1–4.8)
LYMPHOCYTES NFR BLD AUTO: 11.8 % (ref 24–44)
MCH RBC QN AUTO: 32.5 PG (ref 26–34)
MCH RBC QN AUTO: 32.9 PG (ref 26–34)
MONOCYTES # BLD AUTO: 1 10^3/UL (ref 0.3–0.8)
MONOCYTES NFR BLD AUTO: 9.5 % (ref 5–12)
NEUTROPHILS # BLD AUTO: 8.4 10^3/UL (ref 1.8–7.7)
NEUTROPHILS NFR BLD AUTO: 78 % (ref 41–85)
PLATELET # BLD AUTO: 189 10^3/UL (ref 150–400)
PLATELET # BLD AUTO: 207 10^3/UL (ref 150–400)

## 2019-12-25 RX ADMIN — AZITHROMYCIN MONOHYDRATE SCH MLS/HR: 500 INJECTION, POWDER, LYOPHILIZED, FOR SOLUTION INTRAVENOUS at 21:40

## 2019-12-25 RX ADMIN — SODIUM CHLORIDE SCH MLS/HR: 0.9 INJECTION, SOLUTION INTRAVENOUS at 08:45

## 2019-12-25 RX ADMIN — FUROSEMIDE SCH MG: 10 INJECTION INTRAVENOUS at 11:30

## 2019-12-25 RX ADMIN — CARVEDILOL SCH MG: 3.12 TABLET, FILM COATED ORAL at 11:00

## 2019-12-25 RX ADMIN — CARVEDILOL SCH MG: 3.12 TABLET, FILM COATED ORAL at 21:00

## 2019-12-25 RX ADMIN — ASPIRIN TAB DELAYED RELEASE 81 MG SCH MG: 81 TABLET DELAYED RESPONSE at 08:43

## 2019-12-25 RX ADMIN — CEFTRIAXONE SODIUM SCH MLS/HR: 1 INJECTION, POWDER, FOR SOLUTION INTRAMUSCULAR; INTRAVENOUS at 15:27

## 2019-12-25 RX ADMIN — ENOXAPARIN SODIUM SCH MG: 40 INJECTION SUBCUTANEOUS at 21:00

## 2019-12-25 RX ADMIN — LISINOPRIL SCH MG: 20 TABLET ORAL at 17:56

## 2019-12-25 NOTE — CNH
DATE OF CONSULTATION:  12/25/2019



REASON FOR CONSULTATION:  Atrial fibrillation with rapid ventricular response.



HISTORY OF PRESENT ILLNESS:  This is a 39-year-old male who presented to the

Emergency Department of [facility] with symptoms of chest

pain, which he describes as right sided chest pain, worse with deep breaths with

associated chills.  On presentation to the ED, EKG done shows atrial

fibrillation with rapid ventricular response.  He has a history of atrial

fibrillation.  He was started on Cardizem drip for rate control.  He has a 
history of hypertension.

 His CHADS-VASc score is 1.  EKG shows

atrial fibrillation with rapid ventricular response.  Chest CTA that was done on

admission revealed extensive right lower lobe pneumonia with moderate right

sided pleural effusion.  Troponins were noted to be negative.



PAST MEDICAL HISTORY:  He has a past medical history

of hypertension.  History of atrial fibrillation as well.



PAST SURGICAL HISTORY:  He denies any past surgical history.



ALLERGIES:  He has no known drug allergies.



SOCIAL HISTORY:  He denies illicit drug use, but admits to excessive alcohol

intake.  Drinks whiskey as well as excessive tobacco use.



FAMILY HISTORY:  Admits to family history of premature CAD.  Denies sudden

cardiac death.



MEDICATIONS:  He states that he has not been taking his home medications due to

financial issues.



REVIEW OF SYSTEMS:  As per HPI and as per previous records.  All systems

reviewed and negative for interval change.



PHYSICAL EXAMINATION:

VITAL SIGNS:  Blood pressure is 149/88, respiratory rate is 18, pulse is 118,

pulse ox is 96% on room air.

GENERAL:  He is in no apparent distress, alert and oriented x 3.

HEENT:  Normocephalic, atraumatic.  Extraocular muscles intact.  Pupils are

equally round, reactive to light and accommodation.

HEART:  S1, S2 irregular, positive +3/6 holosystolic murmur.  No gallops, rubs 
or

clicks.

LUNGS:  Decreased breath sounds bilaterally.

ABDOMEN:  Obese, soft, nontender, nondistended.

EXTREMITIES:  No cyanosis, no clubbing, no edema.

NEUROLOGIC:  No neurological deficits.  Sensation is intact.



IMPRESSION:

1.  Atrial fibrillation with rapid ventricular response with a CHADS-VASc score

of 1 -- hypertension.

2.  Severe tricuspid regurgitation seen on 2D echo done this admission.

3.  Left ventricular ejection fraction of 25-30%, seen on 2D echo done on this

admission.

4.  New cardiomyopathy.

5.  Hypertension.

6.  History of atrial fibrillation.

7.  Community-acquired pneumonia.

8.  Moderate size right sided pleural effusion seen on CTA chest.



RECOMMENDATION:  This is a 39-year-old male who is presenting with atrial

fibrillation with rapid ventricular response.  He has a CHADS-VASc score of 1. 

At this time, we will continue with the Cardizem drip for rate control to

maintain heart rate less than 110.  He is not a candidate for oral

anticoagulation with a low CHADS-VASc score.  His 2D echo revealed left

ventricular ejection fraction of 25-30% with severe tricuspid regurgitation. 

Given his new cardiomyopathy in the setting of severe valvuloplasty, he is going

to be set up for left heart catheterization tomorrow to rule out obstructive

coronary artery disease.  We would also set him up for a transesophageal

echocardiogram to further evaluate his valvular disease.  He is going to be

n.p.o. after midnight for the procedure set up for tomorrow.  In the meantime,

we will continue to monitor his blood pressure and his heart rate.  We will

start him on oral cardiac medications including Coreg 3.125 p.o. b.i.d. as well

as lisinopril 20 p.o. daily.  Would also put him on Lasix 40 mg IV daily.  Given

his low ejection fraction, he certainly would benefit from a LifeVest before

discharge.  I will place a consult out to the  to arrange for a

wearable cardioverter device (LifeVest).  He also does have some financial

issues and so we would arrange for the  to address financial issues

he may have.  He is going to need a repeat 2D echo to

reevaluate his LVEF after 3 months of medication therapy.  If his left

ventricular ejection fraction remains equal to or less than 35% in 3 months'

time, he will be set up for AICD implantation at that time.  We will continue to

monitor him on telemetry.  Agree with antibiotics for treatment of pneumonia. 

Further recommendations will be made based on his overall clinical course.





______________________________

BROOKLYN MCGRATH D.O.



DR:  AVA/rocky  JOB# [id number]  [id number]

DD:  12/25/2019 12:53  DT:  12/25/2019 13:31

LUPE

## 2019-12-25 NOTE — NUR
Dr. Zhou Epperson at bedside discussing plan of care with patient. New orders received for HERMAN and 
cardiac catheterization tomorrow morning, Coreg 3.125mg po BID first dose now, Lisinopril 
20mg po daily and to administer this afternoon, lasix 40mg IV daily and to wean down 
cardizem by 5mg as tolerated for a heart rate less than 110. Pt will need life vest at 
discharge, case management consulted. RBVO.

## 2019-12-25 NOTE — NUR
APNEA- OCCASIONAL 5 SECOND INTERVALS OF APNEA NOTED WHEN SN AT BEDSIDE.  PATIENT IS SNORING 
AND WILL DESAT TO MID 80'S, THEN HAVE A COUGHING SPELL AND WILL BE BACK UP TO MID 90'S.

## 2019-12-25 NOTE — NUR
CLARIFICATION ORDER- CARDIZEM GTT IS A CONTINUOUS INFUSION.  SPOKE WITH CN, BECAUSE ER PUT 
IT IN AS A ONE TIME ORDER.  HOUSE SUPERVISOR PULLED CARDIZEM VIAL FROM PHARMACY.  SN 
INFORMED THAT PHARMACY WILL FIX THIS ORDER.  NEW BAG HUNG.

## 2019-12-25 NOTE — PRM.PN
Progress Note


Subjective


Date:  Dec 25, 2019


Time:  07:00


Physician Notes:


Pt states he feels better, less chest pain, and reduced SOB. No acute events 

overnight, except periods of apnea and decreased sats observed





Objective


Review IO, Exams,& Results





Vital Signs








  Date Time  Temp Pulse Resp B/P (MAP) Pulse Ox O2 Delivery O2 Flow Rate FiO2


 


12/25/19 08:24      Room Air  


 


12/25/19 06:15  117 14 137/79 (98) 93   


 


12/25/19 04:00 98.8       














Intake and Output 


 


 12/25/19





 07:00


 


Intake Total 535 ml


 


Balance 535 ml


 


 


 


Intake Oral 240 ml


 


Blood Product IV Normal Saline Flush 20 ml


 


Other 275 ml


 


# Voids 1








Laboratory Tests








Test


 12/24/19


12:50 12/25/19


05:16


 


White Blood Count 13.6 10^3/uL  12.6 10^3/uL 


 


Red Blood Count 5.40 10^6/uL  4.86 10^6/uL 


 


Hemoglobin 17.7 g/dL  15.8 g/dL 


 


Hematocrit 53.0 %  47.6 % 


 


Mean Corpuscular Volume 98.1 fL  97.9 fL 


 


Mean Corpuscular Hemoglobin 32.8 pg  32.5 pg 


 


Mean Corpuscular Hemoglobin


Concent 33.4 g/dL 


 33.2 g/dL 





 


Red Cell Distribution Width 15.0 %  15.0 % 


 


Platelet Count 212 10^3/uL  189 10^3/uL 


 


Mean Platelet Volume 9.5 fL  9.5 fL 


 


Neutrophils (%) (Auto) 74.8 %  


 


Lymphocytes (%) (Auto) 12.6 %  


 


Monocytes (%) (Auto) 11.9 %  


 


Neutrophils # (Auto) 10.2 10^3/uL  


 


Lymphocytes # (Auto) 1.7 10^3/uL  


 


Monocytes # (Auto) 1.6 10^3/uL  


 


Absolute Immature Granulocyte


(auto 0.04 10^3 u/L 


 





 


Immature Granulocytes % 0.30 %  


 


Eosinophils % 0.1 %  


 


Basophils % 0.3 %  


 


Basophils # 0.0 10^3/uL  


 


Eosinophil Count 0.0 10^3/uL  


 


Prothrombin Time 12.2 SEC  


 


Prothrombin Time INR


(Non-Therap) 1.2 


 





 


Activated Partial


Thromboplast Time 27.1 SEC 


 





 


D-Dimer 1.92 mg/L  


 


Sodium Level 135 mmol/L  137 mmol/L 


 


Potassium Level 3.9 mmol/L  3.5 mmol/L 


 


Chloride Level 98.0 mmol/L  102.0 mmol/L 


 


Carbon Dioxide Level 24.7 mmol/L  22.3 mmol/L 


 


Anion Gap 16.2  16.2 


 


Blood Urea Nitrogen 8 mg/dL  8 mg/dL 


 


Creatinine 1.20 mg/dL  0.88 mg/dL 


 


Estimated GFR (


American) 81.6 


 116.7 





 


BUN/Creatinine Ratio 6.0  9.0 


 


Glucose Level 115 mg/dL  111 mg/dL 


 


Calcium Level 9.0 mg/dL  8.7 mg/dL 


 


Total Bilirubin 2.7 mg/dL  


 


Aspartate Amino Transf


(AST/SGOT) 24 U/L 


 





 


Alanine Aminotransferase


(ALT/SGPT) 33 U/L 


 





 


Alkaline Phosphatase 104 U/L  


 


Total Creatine Kinase 18 U/L  


 


Creatine Kinase MB < 0.5 ng/mL  


 


Troponin I < 0.02 ng/mL  


 


Pro-B-Type Natriuretic Peptide 6401 pg/mL  


 


Total Protein 7.2 g/dL  


 


Albumin 3.0 g/dL  


 


Globulin 4.2  


 


Helicobacter pylori Screen NEGATIVE  


 


Magnesium Level  1.6 mg/dL 








                               Current Medications








 Medications


  (Trade)  Dose


 Ordered  Sig/Foreign


 PRN Reason  Start Time


 Stop Time Status Last Admin


 


 Acetaminophen


  (Tylenol)  650 mg  Q4H  PRN


 PAIN  12/24/19 21:30


 1/23/20 21:29   





 


 Alprazolam


  (Xanax)  0.25 mg  Q6H  PRN


 ANXIETY  12/24/19 21:30


 1/23/20 21:29   





 


 Aspirin


  (Aspirin Ec)  81 mg  DAILY


   12/25/19 09:00


 1/24/20 08:59  12/25/19 08:43





 


 Azithromycin 500


 mg/Sodium Chloride  250 ml @ 


 175 mls/hr  Q24HRS


   12/24/19 17:00


 1/23/20 16:59  12/24/19 21:40





 


 Ceftriaxone


 Sodium 1 gm/


 Sodium Chloride  100 ml @ 


 100 mls/hr  Q24HRS


   12/24/19 16:00


 1/23/20 15:59  12/24/19 16:55





 


 Diphenhydramine


 HCl


  (Benadryl)  25 mg  Q6HR  PRN


 INSOMNIA  12/24/19 21:30


 1/23/20 21:29   





 


 Diphenhydramine


 HCl


  (Benadryl)  25 mg  Q6HR  PRN


 ITCHING  12/24/19 21:30


 1/23/20 21:29   





 


 Enoxaparin Sodium


  (Lovenox)  40 mg  Q24HRS


   12/24/19 18:30


 1/23/20 18:29  12/24/19 21:30





 


 Ondansetron HCl


  (Zofran 4 Mg/2


 ml Vial)  4 mg  Q4H  PRN


 NAUSEA / VOMITING  12/24/19 21:30


 1/23/20 21:29   





 


 Polyethylene


 Glycol


  (Miralax)  17 gm  DAILY  PRN


 CONSTIPATION  12/24/19 21:30


 1/23/20 21:29   





 


 Simethicone


  (Genasyme)  80 mg  Q6HR  PRN


 GAS  12/24/19 21:30


 1/23/20 21:29   





 


 Sodium Chloride  1,000 ml @ 


 80 mls/hr  [id number]


   12/25/19 09:00


 1/24/20 08:59 UNV 12/25/19 08:45





 


 Tramadol HCl


  (Ultram)  50 mg  Q4HR  PRN


 PAIN 4 - 6  12/24/19 21:30


 1/23/20 21:29   











Uzma - JOSR TROTTER DO


Aspirin (Aspirin Ec) (12/25/19 09:00)


Scd's While In Bed (12/24/19 18:13)


Enoxaparin Sodium (Lovenox) (12/24/19 18:30)


Diphenhydramine Hcl (Benadryl) (12/24/19 21:30)


Diphenhydramine Hcl (Benadryl) (12/24/19 21:30)


Polyethylene Glycol 3350 (Miralax) (12/24/19 21:30)


Simethicone (Genasyme) (12/24/19 21:30)


Ondansetron Hcl/Pf (Zofran 4 Mg/2 Ml Via (12/24/19 21:30)


Alprazolam (Xanax) (12/24/19 21:30)


Tramadol Hcl (Ultram) (12/24/19 21:30)


Acetaminophen (Tylenol) (12/24/19 21:30)


0.9 % Sodium Chloride (Ns 1000ml) (12/25/19 09:00)


Heart:  Normal S2, No murmurs, Gallops


Abdomen:  Normal bowel sounds, Soft, No tenderness


Lungs:  Clear to auscultation, Normal air movement


Skin:  No rashes, No breakdown, No significant lesion





Assessment & Plan:


Problems/Diagnosis:  


(1) Chest pain


ICD Code:  R07.9 - Chest pain, unspecified


SNOMED:  74265754


Status:  Acute


(2) A-fib


ICD Code:  I48.91 - Unspecified atrial fibrillation


SNOMED:  29767530


(3) CAP (community acquired pneumonia)


ICD Code:  J18.9 - Pneumonia, unspecified organism


SNOMED:  587984060


Plan


 1) CAP - Rocephin & Zithromax


 2) A fib w/RVR - cardizem drip, cardiology consulted. rate controlled much 

better this AM


3) DVT prophylaxis - SCDs, JOSR Loving DO               Dec 25, 2019 09:11

## 2019-12-26 VITALS — SYSTOLIC BLOOD PRESSURE: 98 MMHG | DIASTOLIC BLOOD PRESSURE: 50 MMHG

## 2019-12-26 VITALS — SYSTOLIC BLOOD PRESSURE: 112 MMHG | DIASTOLIC BLOOD PRESSURE: 75 MMHG

## 2019-12-26 VITALS — DIASTOLIC BLOOD PRESSURE: 74 MMHG | SYSTOLIC BLOOD PRESSURE: 120 MMHG

## 2019-12-26 VITALS — DIASTOLIC BLOOD PRESSURE: 69 MMHG | SYSTOLIC BLOOD PRESSURE: 113 MMHG

## 2019-12-26 VITALS — DIASTOLIC BLOOD PRESSURE: 100 MMHG | SYSTOLIC BLOOD PRESSURE: 133 MMHG

## 2019-12-26 VITALS — DIASTOLIC BLOOD PRESSURE: 75 MMHG | SYSTOLIC BLOOD PRESSURE: 122 MMHG

## 2019-12-26 VITALS — SYSTOLIC BLOOD PRESSURE: 127 MMHG | DIASTOLIC BLOOD PRESSURE: 83 MMHG

## 2019-12-26 VITALS — DIASTOLIC BLOOD PRESSURE: 93 MMHG | SYSTOLIC BLOOD PRESSURE: 141 MMHG

## 2019-12-26 VITALS — DIASTOLIC BLOOD PRESSURE: 80 MMHG | SYSTOLIC BLOOD PRESSURE: 115 MMHG

## 2019-12-26 VITALS — SYSTOLIC BLOOD PRESSURE: 127 MMHG | DIASTOLIC BLOOD PRESSURE: 113 MMHG

## 2019-12-26 VITALS — DIASTOLIC BLOOD PRESSURE: 49 MMHG | SYSTOLIC BLOOD PRESSURE: 95 MMHG

## 2019-12-26 VITALS — SYSTOLIC BLOOD PRESSURE: 128 MMHG | DIASTOLIC BLOOD PRESSURE: 101 MMHG

## 2019-12-26 VITALS — SYSTOLIC BLOOD PRESSURE: 137 MMHG | DIASTOLIC BLOOD PRESSURE: 87 MMHG

## 2019-12-26 VITALS — DIASTOLIC BLOOD PRESSURE: 67 MMHG | SYSTOLIC BLOOD PRESSURE: 111 MMHG

## 2019-12-26 VITALS — SYSTOLIC BLOOD PRESSURE: 100 MMHG | DIASTOLIC BLOOD PRESSURE: 69 MMHG

## 2019-12-26 VITALS — DIASTOLIC BLOOD PRESSURE: 90 MMHG | SYSTOLIC BLOOD PRESSURE: 138 MMHG

## 2019-12-26 VITALS — SYSTOLIC BLOOD PRESSURE: 135 MMHG | DIASTOLIC BLOOD PRESSURE: 81 MMHG

## 2019-12-26 VITALS — SYSTOLIC BLOOD PRESSURE: 115 MMHG | DIASTOLIC BLOOD PRESSURE: 75 MMHG

## 2019-12-26 VITALS — DIASTOLIC BLOOD PRESSURE: 87 MMHG | SYSTOLIC BLOOD PRESSURE: 128 MMHG

## 2019-12-26 VITALS — DIASTOLIC BLOOD PRESSURE: 100 MMHG | SYSTOLIC BLOOD PRESSURE: 138 MMHG

## 2019-12-26 VITALS — DIASTOLIC BLOOD PRESSURE: 56 MMHG | SYSTOLIC BLOOD PRESSURE: 109 MMHG

## 2019-12-26 VITALS — SYSTOLIC BLOOD PRESSURE: 134 MMHG | DIASTOLIC BLOOD PRESSURE: 89 MMHG

## 2019-12-26 VITALS — DIASTOLIC BLOOD PRESSURE: 83 MMHG | SYSTOLIC BLOOD PRESSURE: 115 MMHG

## 2019-12-26 VITALS — SYSTOLIC BLOOD PRESSURE: 124 MMHG | DIASTOLIC BLOOD PRESSURE: 76 MMHG

## 2019-12-26 VITALS — DIASTOLIC BLOOD PRESSURE: 89 MMHG | SYSTOLIC BLOOD PRESSURE: 133 MMHG

## 2019-12-26 VITALS — DIASTOLIC BLOOD PRESSURE: 98 MMHG | SYSTOLIC BLOOD PRESSURE: 138 MMHG

## 2019-12-26 VITALS — SYSTOLIC BLOOD PRESSURE: 123 MMHG | DIASTOLIC BLOOD PRESSURE: 79 MMHG

## 2019-12-26 VITALS — DIASTOLIC BLOOD PRESSURE: 75 MMHG | SYSTOLIC BLOOD PRESSURE: 136 MMHG

## 2019-12-26 VITALS — DIASTOLIC BLOOD PRESSURE: 96 MMHG | SYSTOLIC BLOOD PRESSURE: 131 MMHG

## 2019-12-26 VITALS — DIASTOLIC BLOOD PRESSURE: 66 MMHG | SYSTOLIC BLOOD PRESSURE: 144 MMHG

## 2019-12-26 VITALS — DIASTOLIC BLOOD PRESSURE: 69 MMHG | SYSTOLIC BLOOD PRESSURE: 110 MMHG

## 2019-12-26 VITALS — DIASTOLIC BLOOD PRESSURE: 73 MMHG | SYSTOLIC BLOOD PRESSURE: 124 MMHG

## 2019-12-26 VITALS — SYSTOLIC BLOOD PRESSURE: 115 MMHG | DIASTOLIC BLOOD PRESSURE: 59 MMHG

## 2019-12-26 VITALS — DIASTOLIC BLOOD PRESSURE: 45 MMHG | SYSTOLIC BLOOD PRESSURE: 98 MMHG

## 2019-12-26 VITALS — SYSTOLIC BLOOD PRESSURE: 111 MMHG | DIASTOLIC BLOOD PRESSURE: 72 MMHG

## 2019-12-26 VITALS — SYSTOLIC BLOOD PRESSURE: 110 MMHG | DIASTOLIC BLOOD PRESSURE: 58 MMHG

## 2019-12-26 VITALS — DIASTOLIC BLOOD PRESSURE: 67 MMHG | SYSTOLIC BLOOD PRESSURE: 143 MMHG

## 2019-12-26 VITALS — DIASTOLIC BLOOD PRESSURE: 100 MMHG | SYSTOLIC BLOOD PRESSURE: 135 MMHG

## 2019-12-26 VITALS — SYSTOLIC BLOOD PRESSURE: 130 MMHG | DIASTOLIC BLOOD PRESSURE: 95 MMHG

## 2019-12-26 VITALS — DIASTOLIC BLOOD PRESSURE: 84 MMHG | SYSTOLIC BLOOD PRESSURE: 151 MMHG

## 2019-12-26 VITALS — SYSTOLIC BLOOD PRESSURE: 108 MMHG | DIASTOLIC BLOOD PRESSURE: 72 MMHG

## 2019-12-26 VITALS — SYSTOLIC BLOOD PRESSURE: 107 MMHG | DIASTOLIC BLOOD PRESSURE: 69 MMHG

## 2019-12-26 VITALS — SYSTOLIC BLOOD PRESSURE: 106 MMHG | DIASTOLIC BLOOD PRESSURE: 69 MMHG

## 2019-12-26 VITALS — DIASTOLIC BLOOD PRESSURE: 61 MMHG | SYSTOLIC BLOOD PRESSURE: 98 MMHG

## 2019-12-26 VITALS — SYSTOLIC BLOOD PRESSURE: 130 MMHG | DIASTOLIC BLOOD PRESSURE: 77 MMHG

## 2019-12-26 VITALS — SYSTOLIC BLOOD PRESSURE: 106 MMHG | DIASTOLIC BLOOD PRESSURE: 70 MMHG

## 2019-12-26 VITALS — SYSTOLIC BLOOD PRESSURE: 108 MMHG | DIASTOLIC BLOOD PRESSURE: 68 MMHG

## 2019-12-26 VITALS — SYSTOLIC BLOOD PRESSURE: 116 MMHG | DIASTOLIC BLOOD PRESSURE: 71 MMHG

## 2019-12-26 VITALS — SYSTOLIC BLOOD PRESSURE: 121 MMHG | DIASTOLIC BLOOD PRESSURE: 68 MMHG

## 2019-12-26 VITALS — SYSTOLIC BLOOD PRESSURE: 153 MMHG | DIASTOLIC BLOOD PRESSURE: 76 MMHG

## 2019-12-26 VITALS — SYSTOLIC BLOOD PRESSURE: 112 MMHG | DIASTOLIC BLOOD PRESSURE: 79 MMHG

## 2019-12-26 VITALS — DIASTOLIC BLOOD PRESSURE: 87 MMHG | SYSTOLIC BLOOD PRESSURE: 140 MMHG

## 2019-12-26 VITALS — SYSTOLIC BLOOD PRESSURE: 141 MMHG | DIASTOLIC BLOOD PRESSURE: 90 MMHG

## 2019-12-26 VITALS — DIASTOLIC BLOOD PRESSURE: 83 MMHG | SYSTOLIC BLOOD PRESSURE: 133 MMHG

## 2019-12-26 VITALS — SYSTOLIC BLOOD PRESSURE: 133 MMHG | DIASTOLIC BLOOD PRESSURE: 79 MMHG

## 2019-12-26 VITALS — SYSTOLIC BLOOD PRESSURE: 140 MMHG | DIASTOLIC BLOOD PRESSURE: 98 MMHG

## 2019-12-26 VITALS — DIASTOLIC BLOOD PRESSURE: 93 MMHG | SYSTOLIC BLOOD PRESSURE: 136 MMHG

## 2019-12-26 VITALS — SYSTOLIC BLOOD PRESSURE: 134 MMHG | DIASTOLIC BLOOD PRESSURE: 108 MMHG

## 2019-12-26 VITALS — DIASTOLIC BLOOD PRESSURE: 75 MMHG | SYSTOLIC BLOOD PRESSURE: 132 MMHG

## 2019-12-26 VITALS — SYSTOLIC BLOOD PRESSURE: 133 MMHG | DIASTOLIC BLOOD PRESSURE: 67 MMHG

## 2019-12-26 PROCEDURE — 4A023N7 MEASUREMENT OF CARDIAC SAMPLING AND PRESSURE, LEFT HEART, PERCUTANEOUS APPROACH: ICD-10-PCS | Performed by: INTERNAL MEDICINE

## 2019-12-26 PROCEDURE — B2111ZZ FLUOROSCOPY OF MULTIPLE CORONARY ARTERIES USING LOW OSMOLAR CONTRAST: ICD-10-PCS | Performed by: INTERNAL MEDICINE

## 2019-12-26 PROCEDURE — B2151ZZ FLUOROSCOPY OF LEFT HEART USING LOW OSMOLAR CONTRAST: ICD-10-PCS | Performed by: INTERNAL MEDICINE

## 2019-12-26 RX ADMIN — CARVEDILOL SCH MG: 3.12 TABLET, FILM COATED ORAL at 21:00

## 2019-12-26 RX ADMIN — CARVEDILOL SCH MG: 3.12 TABLET, FILM COATED ORAL at 08:24

## 2019-12-26 RX ADMIN — AZITHROMYCIN MONOHYDRATE SCH MLS/HR: 500 INJECTION, POWDER, LYOPHILIZED, FOR SOLUTION INTRAVENOUS at 21:00

## 2019-12-26 RX ADMIN — ENOXAPARIN SODIUM SCH MG: 40 INJECTION SUBCUTANEOUS at 21:00

## 2019-12-26 RX ADMIN — SODIUM CHLORIDE SCH MLS/HR: 0.9 INJECTION, SOLUTION INTRAVENOUS at 21:26

## 2019-12-26 RX ADMIN — LISINOPRIL SCH MG: 20 TABLET ORAL at 08:24

## 2019-12-26 RX ADMIN — SODIUM CHLORIDE SCH MLS/HR: 0.9 INJECTION, SOLUTION INTRAVENOUS at 05:22

## 2019-12-26 RX ADMIN — FUROSEMIDE SCH MG: 10 INJECTION INTRAVENOUS at 08:25

## 2019-12-26 RX ADMIN — CEFTRIAXONE SODIUM SCH MLS/HR: 1 INJECTION, POWDER, FOR SOLUTION INTRAMUSCULAR; INTRAVENOUS at 17:23

## 2019-12-26 RX ADMIN — SODIUM CHLORIDE SCH MLS/HR: 0.9 INJECTION, SOLUTION INTRAVENOUS at 22:30

## 2019-12-26 RX ADMIN — ASPIRIN TAB DELAYED RELEASE 81 MG SCH MG: 81 TABLET DELAYED RESPONSE at 08:24

## 2019-12-26 NOTE — NUR
BACK ON UNIT FROM CATH LAB

TR BAND INTACT WITH 12ML OF AIR.  NO DRAINAGE OR HEMATOMA NOTED AT INSERTION SITE.

PATIENT INSTRUCTED ON USE OF RIGHT ARM AND HAND.

REPORT RECEIVED FROM NILSON MEJIA RN.

## 2019-12-26 NOTE — NUR
Dr. Epperson at bedside.  Verbal order to continue cardizem and amiodarone drip.  May titrate 
cardizem down if HR stays below 110bpm.  Patient denies pain or needs at this time.  Life 
vest in place.

## 2019-12-26 NOTE — NUR
TR BAND REMOVED

COVERED WITH 4X4 GAUZE AND TEGADERM

HEMOSTATIC PATCH PER 'S ORDER IS UNAVAILABLE.

NO HEMATOMA, BLEEDING REDNESS AT INSERTION SITE.

-------------------------------------------------------------------------------

Addendum: 12/26/19 at 1628 by Timothy Talley RN - ICU RN

-------------------------------------------------------------------------------

SEE HEART CATH INTERVENTION

## 2019-12-26 NOTE — NUR
Gauze and tegaderm removed from radial insertion site.

surgicel absorbable hemostat applied to site and covered with tegaderm.

## 2019-12-26 NOTE — PRM.PN
Progress Note


Subjective


Date:  Dec 26, 2019


Time:  07:00


Physician Notes:


Pt doing well, no acute events overnight. Cath and HERMAN scheduled with cardiology





Objective


Review IO, Exams,& Results





Vital Signs








  Date Time  Temp Pulse Resp B/P (MAP) Pulse Ox O2 Delivery O2 Flow Rate FiO2


 


12/26/19 09:45  106 24 138/100 (113) 94   


 


12/26/19 08:30 98.1       


 


12/26/19 07:21      Room Air  














Intake and Output 


 


 12/26/19





 07:00


 


Intake Total 1547 ml


 


Output Total 1350 ml


 


Balance 197 ml


 


 


 


Intake Oral 920 ml


 


Electrolyte Solution 627 ml


 


Output Urine Total 1350 ml








Laboratory Tests








Test


 12/24/19


12:50 12/25/19


05:16 12/25/19


12:55


 


White Blood Count 13.6 10^3/uL  12.6 10^3/uL  10.7 10^3/uL 


 


Red Blood Count 5.40 10^6/uL  4.86 10^6/uL  5.02 10^6/uL 


 


Hemoglobin 17.7 g/dL  15.8 g/dL  16.5 g/dL 


 


Hematocrit 53.0 %  47.6 %  49.0 % 


 


Mean Corpuscular Volume 98.1 fL  97.9 fL  97.6 fL 


 


Mean Corpuscular Hemoglobin 32.8 pg  32.5 pg  32.9 pg 


 


Mean Corpuscular Hemoglobin


Concent 33.4 g/dL 


 33.2 g/dL 


 33.7 g/dL 





 


Red Cell Distribution Width 15.0 %  15.0 %  14.9 % 


 


Platelet Count 212 10^3/uL  189 10^3/uL  207 10^3/uL 


 


Mean Platelet Volume 9.5 fL  9.5 fL  9.3 fL 


 


Neutrophils (%) (Auto) 74.8 %   78.0 % 


 


Lymphocytes (%) (Auto) 12.6 %   11.8 % 


 


Monocytes (%) (Auto) 11.9 %   9.5 % 


 


Neutrophils # (Auto) 10.2 10^3/uL   8.4 10^3/uL 


 


Lymphocytes # (Auto) 1.7 10^3/uL   1.3 10^3/uL 


 


Monocytes # (Auto) 1.6 10^3/uL   1.0 10^3/uL 


 


Absolute Immature Granulocyte


(auto 0.04 10^3 u/L 


 


 0.03 10^3 u/L 





 


Immature Granulocytes % 0.30 %   0.30 % 


 


Eosinophils % 0.1 %   0.1 % 


 


Basophils % 0.3 %   0.3 % 


 


Basophils # 0.0 10^3/uL   0.0 10^3/uL 


 


Eosinophil Count 0.0 10^3/uL   0.0 10^3/uL 


 


Prothrombin Time 12.2 SEC   11.3 SEC 


 


Prothrombin Time INR


(Non-Therap) 1.2 


 


 1.1 





 


Activated Partial


Thromboplast Time 27.1 SEC 


 


 27.4 SEC 





 


D-Dimer 1.92 mg/L   


 


Sodium Level 135 mmol/L  137 mmol/L  138 mmol/L 


 


Potassium Level 3.9 mmol/L  3.5 mmol/L  3.5 mmol/L 


 


Chloride Level 98.0 mmol/L  102.0 mmol/L  101.0 mmol/L 


 


Carbon Dioxide Level 24.7 mmol/L  22.3 mmol/L  26.2 mmol/L 


 


Anion Gap 16.2  16.2  14.3 


 


Blood Urea Nitrogen 8 mg/dL  8 mg/dL  9 mg/dL 


 


Creatinine 1.20 mg/dL  0.88 mg/dL  1.00 mg/dL 


 


Estimated GFR (


American) 81.6 


 116.7 


 100.7 





 


BUN/Creatinine Ratio 6.0  9.0  9.0 


 


Glucose Level 115 mg/dL  111 mg/dL  134 mg/dL 


 


Calcium Level 9.0 mg/dL  8.7 mg/dL  9.2 mg/dL 


 


Total Bilirubin 2.7 mg/dL   


 


Aspartate Amino Transf


(AST/SGOT) 24 U/L 


 


 





 


Alanine Aminotransferase


(ALT/SGPT) 33 U/L 


 


 





 


Alkaline Phosphatase 104 U/L   


 


Total Creatine Kinase 18 U/L   


 


Creatine Kinase MB < 0.5 ng/mL   


 


Troponin I < 0.02 ng/mL   


 


Pro-B-Type Natriuretic Peptide 6401 pg/mL   


 


Total Protein 7.2 g/dL   


 


Albumin 3.0 g/dL   


 


Globulin 4.2   


 


Helicobacter pylori Screen NEGATIVE   


 


Magnesium Level  1.6 mg/dL  








                               Current Medications








 Medications


  (Trade)  Dose


 Ordered  Sig/Foreign


 PRN Reason  Start Time


 Stop Time Status Last Admin


 


 Acetaminophen


  (Tylenol)  650 mg  Q4H  PRN


 PAIN  12/24/19 21:30


 1/23/20 21:29   





 


 Alprazolam


  (Xanax)  0.25 mg  Q6H  PRN


 ANXIETY  12/24/19 21:30


 1/23/20 21:29   





 


 Aspirin


  (Aspirin Ec)  81 mg  DAILY


   12/25/19 09:00


 1/24/20 08:59  12/26/19 08:24





 


 Azithromycin 500


 mg/Sodium Chloride  250 ml @ 


 175 mls/hr  Q24HRS


   12/25/19 21:00


 1/24/20 20:59  12/25/19 21:40





 


 Carvedilol


  (Coreg)  3.125 mg  BID


   12/25/19 11:00


 1/24/20 10:59  12/26/19 08:24





 


 Ceftriaxone


 Sodium 1 gm/


 Sodium Chloride  100 ml @ 


 100 mls/hr  Q24HRS


   12/24/19 16:00


 1/23/20 15:59  12/25/19 15:27





 


 Diltiazem HCl 125


 mg/Sodium Chloride  125 ml @ 0


 mls/hr  IV


   12/25/19 13:00


 1/24/20 12:59   





 


 Diphenhydramine


 HCl


  (Benadryl)  25 mg  Q6HR  PRN


 INSOMNIA  12/24/19 21:30


 1/23/20 21:29   





 


 Enoxaparin Sodium


  (Lovenox)  40 mg  Q24HRS


   12/25/19 21:00


 1/24/20 20:59  12/25/19 21:00





 


 Furosemide


  (Lasix)  40 mg  DAILY


   12/25/19 11:30


 1/24/20 11:29  12/26/19 08:25





 


 Lisinopril


  (Zestril)  20 mg  DAILY


   12/26/19 09:00


 1/25/20 08:59  12/26/19 08:24





 


 Ondansetron HCl


  (Zofran 4 Mg/2


 ml Vial)  4 mg  Q4H  PRN


 NAUSEA / VOMITING  12/24/19 21:30


 1/23/20 21:29   





 


 Polyethylene


 Glycol


  (Miralax)  17 gm  DAILY  PRN


 CONSTIPATION  12/24/19 21:30


 1/23/20 21:29   





 


 Simethicone


  (Genasyme)  80 mg  Q6HR  PRN


 GAS  12/24/19 21:30


 1/23/20 21:29   





 


 Sodium Chloride  1,000 ml @ 


 80 mls/hr  [id number]


   12/25/19 09:00


 1/24/20 08:59  12/26/19 05:22





 


 Tramadol HCl


  (Ultram)  50 mg  Q4HR  PRN


 PAIN 4 - 6  12/24/19 21:30


 1/23/20 21:29   











Orders - SERGO,JOSR DO


Aspirin (Aspirin Ec) (12/25/19 09:00)


Scd's While In Bed (12/24/19 18:13)


Diphenhydramine Hcl (Benadryl) (12/24/19 21:30)


Polyethylene Glycol 3350 (Miralax) (12/24/19 21:30)


Simethicone (Genasyme) (12/24/19 21:30)


Ondansetron Hcl/Pf (Zofran 4 Mg/2 Ml Via (12/24/19 21:30)


Alprazolam (Xanax) (12/24/19 21:30)


Tramadol Hcl (Ultram) (12/24/19 21:30)


Acetaminophen (Tylenol) (12/24/19 21:30)


0.9 % Sodium Chloride (Ns 1000ml) (12/25/19 09:00)


Diltiazem Hcl (Cardizem) (12/25/19 13:00)


Enoxaparin Sodium (Lovenox) (12/25/19 21:00)


Azithromycin (Zithromax) (12/25/19 21:00)


Potassium Chloride (Potassium Chloride) (12/26/19 12:00)


Basic Metabolic Panel (12/27/19 05:00)


Heart:  Normal S2, No murmurs, Gallops


Abdomen:  Normal bowel sounds, Soft, No tenderness


Lungs:  Clear to auscultation, Normal air movement


Skin:  No rashes, No breakdown, No significant lesion





Assessment & Plan:


Problems/Diagnosis:  


(1) A-fib


Was this Present on Admission?:  YES


ICD Code:  I48.91 - Unspecified atrial fibrillation


SNOMED:  40435420


(2) CAP (community acquired pneumonia)


Was this Present on Admission?:  YES


ICD Code:  J18.9 - Pneumonia, unspecified organism


SNOMED:  888976407


(3) Hypokalemia


ICD Code:  E87.6 - Hypokalemia


SNOMED:  61530806


(4) Chest pain


Was this Present on Admission?:  YES


ICD Code:  R07.9 - Chest pain, unspecified


SNOMED:  11050956


Status:  Acute


Plan


 1) CAP - Rocephin & Zithromax


 2) A fib w/RVR - cardizem drip, cardiology consulted. rate controlled much 

better this AM. cath and HERMAN scheduled


3) DVT prophylaxis - SCDs, JOSR Loving DO               Dec 26, 2019 11:51

## 2019-12-26 NOTE — [facility]
DATE OF SERVICE:  12/26/2019



INDICATION FOR PROCEDURE:  New cardiomyopathy.  This is a 39-year-old male who

presented to [facility] with atrial fibrillation and rapid

ventricular response.  He is currently being treated for pneumonia as well.  On

presentation, a 2D echo shows a left ventricular ejection fraction of 25-30%. 

Given the new cardiomyopathy, he was setup for cardiac catheterization to rule

out obstructive CAD.  Informed consents were obtained and the patient was taken

to the cardiac catheterization suite.



DIAGNOSES: 

1.  New cardiomyopathy with an LVEF of 25-30% as seen on 2D echo.

2.  Atrial fibrillation with rapid ventricular response.

3.  Hypertension.

4.  Hyperlipidemia.

5.  Obesity.

6.  Alcohol abuse.



DESCRIPTION OF PROCEDURE:  Access was obtained using a 6-Japanese glide sheath to

cannulate the right radial artery.  Diagnostic angiography was carried out using

the Tiger 4 catheter to engage the left main.  The left main was noted to be

short and angiographically normal.  It bifurcates into the left anterior

descending artery and the left circumflex artery.  The left anterior descending

artery was noted to have mild luminal irregularities.  It runs in the

interventricular groove to the apex to form a type 2 LAD.  It gives off 2

diagonal branches.  There are noted to have mild luminal irregularities.  CONNIE 2

flow is visualized in the left system suggestive of microvascular disease.  Left

circumflex artery is noted to be a nondominant with mild luminal irregularities.

 It gives off a high obtuse marginal branch.  Obtuse marginal 1 branch that has

mild luminal irregularities.  This obtuse marginal 2 branch was also noted to

have mild luminal irregularities.  A Tiger 4 catheter was then used to engage

the RCA.  The RCA was noted to be dominant with mild luminal irregularities.  It

bifurcates distally to an RPL and RPDA branch, both noted to have mild luminal

irregularities.  Again, CONNIE 2 flow was observed in the RCA suspicious of

microvascular disease.  The Bolivar 4 catheter was then exchanged for a pigtail

catheter, which was used to cross the aortic valve into the left ventricle. 

Left ventriculography was performed.  LVEF was noted to be 30%.  Upon pullback

of the catheter, there was no gradient across the aortic valve.  The pigtail

catheter was then removed and hemostasis was established using the TR band.  The

patient left the cath lab in stable condition.  There were no complications.



IMPRESSION:

1.  Nonischemic cardiomyopathy, likely alcohol induced.

2.  Nonobstructive coronary artery disease.

3.  Selective coronary angiography.

4.  Left ventriculography. 

5.  LVEF of 30%.

6.  LVEDP of 14.

7.  Hemostasis is established using a TR band.



RECOMMENDATIONS:  No coronary intervention is necessary at this time.  Would

optimize cardiac medications.  The patient will certainly benefit from a

LifeVest before discharge.  Lifestyle modification factors including tobacco as

well as alcohol cessation has been strongly advised.





______________________________

BROOKLYN MCGRATH D.O.



DR:  Yadi  JOB# [id number]  [id number]

DD:  12/26/2019 12:48  DT:  12/26/2019 13:24

## 2019-12-26 NOTE — NUR
DISCHARGE PLAN/LIFE VEST/MEDICATION ASSISTANCE



CASE MANAGEMENT VISITED WITH PATIENT WITH EX WIFE, CLIF, AT BEDSIDE WITH HIS PERMISSION.  
LIVES AT HOME ALONE.  INDEPENDENT OF ADLS.  PCP- IN [place].  DR. MCGRATH REQUESTED 
PATIENT TO HAVE A LIFE VEST BEFORE DISCHARGE.  CM PRINTED ORDER FOR LIFE VEST AND LEFT ON 
CHART IN ICU FOR DR. MCGRATH TO SIGN WITH SANTO AMIN AWARE OF NEED FOR SIGNATURE.  TINO 
CONTACTED CAROLANN LINTON WITH [employer] @ [phone].  HE STATED THAT [employer] RECENTLY 
CHANGED THEIR CASH PAY/ASSISTANCE PROGRAM AND IT IS AS FOLLOWS:  THE HOSPITAL CAN PAY A 
$3,300 LEASE FOR THE LIFE VEST FOR THE PATIENT IF THE PATIENT NEEDS TO DISCHARGE BEFORE THE 
APPROVAL FOR A LIFE VEST OR THE PATIENT CAN DISCHARGE WITHOUT THE LIFE VEST AND FOLLOW UP AS 
AN OUTPATIENT TO OBTAIN THE LIFE VEST IF IT IS NOT URGENT AS DETERMINED BY THE PHYSICIAN, OR 
THE PATIENT CAN FILL OUT THE APPLICATION FOR THE ASSISTANCE PROGRAM AND WILL NEED TO SHOW 
THE LAST PAY STUB, W2, OR TAX INFORMATION.  THE APPROVAL PROCESS CAN TAKE UP TO 3 BUSINESS 
DAYS TO SEE IF THEY QUALIFY.  THERE WILL BE A $250 DEPOSIT FOR THE EQUIPMENT AND THAT $250 
IS RETURNED WHEN THE EQUIPMENT IS RETURNED AT THE END OF THE NEED IN 3-6MONTHS OR THE LENGTH 
OF NEED FOR THE DEVICE.  TINO SPOKE WITH PATIENT AND CLIF CONCERNING ALL OF THE ABOVE AND THEY 
ARE ABLE TO PAY THE $250 IF NEEDED FOR THE DEPOSIT.  THE PATIENT JUST RECENTLY GOT A NEW JOB 
AT [employer] AND HAS NOT YET RECEIVED A PAYCHECK TO SUPPLY A PAY STUB, HE DID NOT FILE TAXES 
FOR 2018 EITHER AND DOES NOT HAVE W2 OR TAX INFORMATION.  THE LAST DOCUMENTED WAGE HE 
RECEIVED WAS IN FEBRUARY 2019 BY [employer].  CLIF IS WORKING WITH THE COMPANY TO GET 
DOCUMENTATION OF INCOME FROM THEM.  PATIENT WAS LEAVING FLOOR FOR HEART CATH VI STRETCHER.  
TINO CONTACTED CAROLANN LINTON WITH [employer] BACK AND STATED HE WOULD CALL PATIENT HIMSELF 
THIS AFTERNOON TO FOLLOW UP WITH HIM AND TALK ABOUT FORMS OF PROOF OF INCOME THAT WOULD 
QUALIFY.  CM NOTIFIED PATIENT AND CLIF OF CAROLANN'S PLAN TO FOLLOW UP THIS AFTERNOON.  TINO ALSO 
CONTACTED [facility] TO GET COUPON FOR ELIQUIS AND PATIENT ASSIST PROGRAM FOR 
FUTURE NEED AFTER FREE 30 DAY SUPPLY.  DISCHARGE PLAN IS TO DISCHARGE HOME ALONE WITH FAMILY 
AVAILABLE TO CHECK ON HIM WHEN NEEDED.  CM WILL CONTINUE TO FOLLOW FOR DISCHARGE NEEDS.

## 2019-12-26 NOTE — PRM.PN
Subjective


Subjective


Date:  Dec 26, 2019


Time:  12:27





Review of Systems


Constitutional:  No: Fever, Chills, Sweats, Weakness, Malaise, Other


Eyes:  No: Pain, Vision change, Conjunctivae inflammation, Eyelid inflammation, 

Other, Redness


ENT:  No: Ear pain, Ear discharge, Nose pain, Nose discharge, Nose congestion, 

Mouth pain, Mouth swelling, Throat pain, Throat swelling, Other


Respiratory:  No: Cough, Dry, Shortness of breath, SOB with excertion, Wheezing,

Hemoptysis, Pleuritic Pain, Sputum, Wheezing, Other


Cardiovascular:  No: Chest Pain, Palpitations, Orthopnea, Paroxysmal Noc. 

Dyspnea, Edema, Lt Headedness, Other


Gastrointestinal:  No: Nausea, Vomiting, Abdominal Pain, Diarrhea, Constipation,

Melena, Hematochezia, Other


Musculoskeletal:  No: other, neck pain, shoulder pain, arm pain, back pain, hand

pain, leg pain, foot pain


Neurological:  No: Weakness, Numbness, Incoordination, Change in speech, 

Confusion, Seizures, Other


Allergies:  


Coded Allergies:  


     No Known Allergies (Unverified , 12/17/16)


Scheduled


Aspirin (Aspirin Ec), 81 MG PO DAILY


[Magnesium Oxide], 400 MG PO BID





Objective


Vitals and I/O





Vital Sign - Last 24 Hours








 12/25/19 12/25/19 12/25/19 12/25/19





 12:30 12:40 12:46 13:00


 


Temp   97.7 


 


Pulse 100  129 103


 


Resp 18  20 18


 


B/P (MAP) 152/97 (115)  147/76 (99) 146/90 (108)


 


Pulse Ox 97   96


 


O2 Delivery  Room Air  


 


    





 12/25/19 12/25/19 12/25/19 12/25/19





 13:15 13:30 13:45 14:01


 


Pulse 100 106 102 95


 


Resp 18 20  19


 


B/P (MAP) 147/52 (83) 147/87 (107) 133/83 (100) 112/72 (85)


 


Pulse Ox 94 96 94 94





 12/25/19 12/25/19 12/25/19 12/25/19





 14:15 14:30 15:02 15:15


 


Pulse 104 108 105 102


 


Resp 17 17 15 16


 


B/P (MAP) 128/82 (97) 132/59 (83) 136/86 (103) 154/71 (98)


 


Pulse Ox 96 94 97 92





 12/25/19 12/25/19 12/25/19 12/25/19





 15:30 16:17 16:22 16:30


 


Temp  98.0  


 


Pulse 103 106  103


 


Resp 16 14  19


 


B/P (MAP) 128/84 (99) 154/86 (108)  159/97 (117)


 


Pulse Ox 93 96  97


 


O2 Delivery   Room Air 


 


    





 12/25/19 12/25/19 12/25/19 12/25/19





 16:45 17:00 17:15 17:30


 


Pulse 111 111 117 113


 


Resp 17 14 18 14


 


B/P (MAP) 142/86 (104) 121/84 (96) 137/93 (108) 146/80 (102)


 


Pulse Ox 93 93 93 94





 12/25/19 12/25/19 12/25/19 12/25/19





 17:45 17:56 18:00 18:16


 


Pulse 99  108 103


 


Resp 12  18 16


 


B/P (MAP) 135/86 (102) 139/85 139/85 (103) 137/93 (108)


 


Pulse Ox 93  94 95





 12/25/19 12/25/19 12/25/19 12/25/19





 18:30 18:45 19:00 19:00


 


Pulse 104 96  114


 


Resp  19  20


 


B/P (MAP) 138/83 (101) 136/82 (100)  129/76 (93)


 


Pulse Ox 93 96  95


 


O2 Delivery   Room Air 





 12/25/19 12/25/19 12/25/19 12/25/19





 19:15 19:30 19:45 20:00


 


Pulse 141 104 100 104


 


Resp 17 15 17 14


 


B/P (MAP) 144/82 (102) 132/50 (77) 146/94 (111) 135/96 (109)


 


Pulse Ox 94 94 93 96





 12/25/19 12/25/19 12/25/19 12/25/19





 20:15 20:30 20:45 21:00


 


Pulse 110 108 115 112


 


Resp 22 20 15 


 


B/P (MAP) 132/89 (103) 134/81 (98) 146/85 (105) 137/93


 


Pulse Ox 96 95 97 





 12/25/19 12/25/19 12/25/19 12/25/19





 21:01 21:15 21:30 21:46


 


Pulse 104 103 106 110


 


Resp 25 13 10 19


 


B/P (MAP) 169/116 (133) 133/89 (104) 131/98 (109) 125/79 (94)


 


Pulse Ox 95 95 96 97





 12/25/19 12/25/19 12/25/19 12/25/19





 22:00 22:15 22:30 22:45


 


Pulse 120 114 112 110


 


Resp 12 20 18 17


 


B/P (MAP) 148/57 (87) 148/92 (110) 104/54 (71) 108/70 (83)


 


Pulse Ox 95 94 96 95





 12/25/19 12/25/19 12/25/19 12/25/19





 23:00 23:01 23:15 23:30


 


Pulse 85 76 103 105


 


Resp 22 20 26 26


 


B/P (MAP)  133/96 (108) 124/85 (98) 121/102 (108)


 


Pulse Ox 93 92 93 94





 12/25/19 12/26/19 12/26/19 12/26/19





 23:45 00:00 00:00 00:15


 


Temp  98.4  


 


Pulse 109 104  100


 


Resp 23 25 23


 


B/P (MAP) 120/81 (94) 133/83 (100)  124/76 (92)


 


Pulse Ox 93 94  93


 


O2 Delivery   Room Air 


 


    





 12/26/19 12/26/19 12/26/19 12/26/19





 00:30 00:45 01:15 01:30


 


Pulse 103 94 97 101


 


Resp 27 15 21 21


 


B/P (MAP) 124/73 (90) 140/87 (104) 132/75 (94) 138/90 (106)


 


Pulse Ox 93 88 97 97





 12/26/19 12/26/19 12/26/19 12/26/19





 01:45 02:00 02:01 02:17


 


Pulse 93 93 96 101


 


Resp 21 13 11 13


 


B/P (MAP) 133/100 (111)  153/76 (101) 137/87 (104)


 


Pulse Ox 98 98 97 97





 12/26/19 12/26/19 12/26/19 12/26/19





 02:30 02:45 03:00 03:15


 


Pulse 99 104 94 103


 


Resp 25 14 20 14


 


B/P (MAP) 128/101 (110) 135/81 (99) 115/80 (92) 95/49 (64)


 


Pulse Ox 98 97 95 94





 12/26/19 12/26/19 12/26/19 12/26/19





 03:30 03:45 04:00 04:00


 


Pulse 98 104  105


 


Resp 24 20  21


 


B/P (MAP) 107/69 (82) 98/50 (66)  111/67 (82)


 


Pulse Ox 94 94  94


 


O2 Delivery   Room Air 





 12/26/19 12/26/19 12/26/19 12/26/19





 04:15 04:30 04:45 05:00


 


Temp    98.1


 


Pulse 90 116 101 104


 


Resp 13 13 22 34


 


B/P (MAP) 98/45 (62) 120/74 (89) 133/79 (97) 


 


Pulse Ox 94  90 95


 


    





 12/26/19 12/26/19 12/26/19 12/26/19





 05:02 05:15 05:30 06:30


 


Pulse 107 72 101 116


 


Resp 23 23 10 15


 


B/P (MAP) 100/69 (79) 110/58 (75) 133/67 (89) 136/93 (107)


 


Pulse Ox 97 94 97 96





 12/26/19 12/26/19 12/26/19 12/26/19





 06:45 07:00 07:16 07:21


 


Pulse 107 101 112 


 


Resp 25 13 16 


 


B/P (MAP) 136/75 (95) 140/98 (112) 135/100 (112) 


 


Pulse Ox 92 93 92 


 


O2 Delivery    Room Air





 12/26/19 12/26/19 12/26/19 12/26/19





 07:30 07:45 07:46 08:00


 


Pulse 126 115 112 96


 


Resp 26 21 27 17


 


B/P (MAP) 131/96 (108)  134/108 (117) 138/98 (111)


 


Pulse Ox 96 96 94 99





 12/26/19 12/26/19 12/26/19 12/26/19





 08:15 08:24 08:24 08:25


 


Pulse 107 107  


 


Resp 29   


 


B/P (MAP) 144/66 (92) 144/66 144/66 144/66


 


Pulse Ox 93   





 12/26/19 12/26/19 12/26/19 12/26/19





 08:30 08:45 09:00 09:16


 


Temp 98.1   


 


Pulse 121 114 134 117


 


Resp 20 27  28


 


B/P (MAP) 130/77 (94) 133/89 (104) 134/89 (104) 141/93 (109)


 


Pulse Ox 90 96 99 93


 


    





 12/26/19 12/26/19 12/26/19 





 09:30 09:45 12:23 


 


Pulse 113 106  


 


Resp 25 24  


 


B/P (MAP) 141/90 (107) 138/100 (113)  


 


Pulse Ox 94 94  


 


O2 Delivery   Room Air 














Intake and Output   


 


 12/25/19 12/25/19 12/26/19





 15:00 23:00 07:00


 


Intake Total  1547 ml 


 


Output Total  1350 ml 


 


Balance  197 ml 








General:  Alert, Oriented X3


HEENT:  Atraumatic, PERRLA, EOMI


Lungs:  Clear to auscultation, Normal air movement


Heart:  Normal S1, Normal S2, No murmurs, Gallops


Abdomen:  Normal bowel sounds, Soft, No tenderness


Extremities:  No clubbing, No cyanosis


Skin:  No rashes, No breakdown, No significant lesion


All Results(Lab/Rad)





Laboratory Tests








Test


 12/25/19


12:55


 


White Blood Count 10.7 10^3/uL 


 


Red Blood Count 5.02 10^6/uL 


 


Hemoglobin 16.5 g/dL 


 


Hematocrit 49.0 % 


 


Mean Corpuscular Volume 97.6 fL 


 


Mean Corpuscular Hemoglobin 32.9 pg 


 


Mean Corpuscular Hemoglobin


Concent 33.7 g/dL 





 


Red Cell Distribution Width 14.9 % 


 


Platelet Count 207 10^3/uL 


 


Mean Platelet Volume 9.3 fL 


 


Neutrophils (%) (Auto) 78.0 % 


 


Lymphocytes (%) (Auto) 11.8 % 


 


Monocytes (%) (Auto) 9.5 % 


 


Neutrophils # (Auto) 8.4 10^3/uL 


 


Lymphocytes # (Auto) 1.3 10^3/uL 


 


Monocytes # (Auto) 1.0 10^3/uL 


 


Absolute Immature Granulocyte


(auto 0.03 10^3 u/L 





 


Immature Granulocytes % 0.30 % 


 


Eosinophils % 0.1 % 


 


Basophils % 0.3 % 


 


Basophils # 0.0 10^3/uL 


 


Eosinophil Count 0.0 10^3/uL 


 


Prothrombin Time 11.3 SEC 


 


Prothrombin Time INR


(Non-Therap) 1.1 





 


Activated Partial


Thromboplast Time 27.4 SEC 





 


Sodium Level 138 mmol/L 


 


Potassium Level 3.5 mmol/L 


 


Chloride Level 101.0 mmol/L 


 


Carbon Dioxide Level 26.2 mmol/L 


 


Glucose Level 134 mg/dL 


 


Blood Urea Nitrogen 9 mg/dL 


 


Creatinine 1.00 mg/dL 


 


Calcium Level 9.2 mg/dL 


 


Anion Gap 14.3 


 


Estimated GFR (


American) 100.7 





 


BUN/Creatinine Ratio 9.0 








Current Medications








 Medications


  (Trade)  Dose


 Ordered  Sig/Foreign


 Route


 PRN Reason  Start Time


 Stop Time Status Last Admin


Dose Admin


 


 Diltiazem HCl


  (Cardizem)  60 mg  STK-MED ONCE


 .ROUTE


   12/24/19 12:50


 12/24/19 12:52 DC  





 


 Diltiazem HCl


  (Cardizem)  25 mg  STK-MED ONCE


 IV


   12/24/19 12:50


 12/24/19 12:52 DC  





 


 Diltiazem HCl


  (Cardizem)  20 mg  STAT  STAT


 IV


   12/24/19 12:53


 12/24/19 12:55 DC 12/24/19 13:00





 


 Diltiazem HCl


  (Cardizem)  30 mg  STAT  STAT


 PO


   12/24/19 12:53


 12/24/19 12:55 DC 12/24/19 13:00





 


 Diltiazem HCl


  (Cardizem)  25 mg  STK-MED ONCE


 IV


   12/24/19 13:14


 12/24/19 13:16 DC  





 


 Diltiazem HCl


  (Cardizem)  25 mg  STAT  STAT


 IV


   12/24/19 13:20


 12/24/19 13:30 DC 12/24/19 13:20





 


 Diltiazem HCl


  (Cardizem)  125 mg  STK-MED ONCE


 .ROUTE


   12/24/19 13:48


 12/24/19 13:49 DC  





 


 Sodium Chloride  100 ml @ ud  STK-MED ONCE


 IV


   12/24/19 13:49


 12/24/19 13:51 DC  





 


 Diltiazem HCl


  (Cardizem)  125 mg  STAT  STAT


 IV


   12/24/19 14:09


 12/24/19 14:11 DC 12/24/19 14:10





 


 Ceftriaxone


 Sodium 1 gm/


 Sodium Chloride  100 ml @ 


 100 mls/hr  Q24HRS


 IV


   12/24/19 16:00


 1/23/20 15:59  12/25/19 15:27





 


 Ceftriaxone Sodium


  (Rocephin)  1 gm  STK-MED ONCE


 .ROUTE


   12/24/19 16:47


 12/24/19 16:48 DC  





 


 Sodium Chloride  100 ml @ ud  STK-MED ONCE


 IV


   12/24/19 16:47


 12/24/19 16:49 DC  





 


 Diltiazem HCl


  (Cardizem)  125 mg  OT  ONCE


 IV


   12/24/19 17:00


 12/24/19 17:01 DC 12/24/19 23:00





 


 Ceftriaxone


 Sodium 1 gm/


 Sodium Chloride  100 ml @ 


 100 mls/hr  DAILY


 IV


   12/25/19 09:00


 12/24/19 20:42 DC  





 


 Azithromycin 500


 mg/Sodium Chloride  250 ml @ 


 175 mls/hr  Q24HRS


 IV


   12/24/19 17:00


 12/25/19 16:46 DC 12/24/19 21:40





 


 Aspirin


  (Aspirin Ec)  81 mg  DAILY


 PO


   12/25/19 09:00


 1/24/20 08:59  12/26/19 08:24





 


 Enoxaparin Sodium


  (Lovenox)  40 mg  Q24HRS


 SQ


   12/24/19 18:30


 12/25/19 15:03 DC 12/24/19 21:30





 


 Sodium Chloride  1,000 ml @ 


 80 mls/hr  [id number] ONCE


 IV


   12/24/19 18:30


 12/25/19 06:59 DC  





 


 Sodium Chloride  250 ml @ ud  STK-MED ONCE


 IV


   12/24/19 21:16


 12/24/19 21:18 DC  





 


 Diphenhydramine


 HCl


  (Benadryl)  25 mg  Q6HR  PRN


 PO


 INSOMNIA  12/24/19 21:30


 1/23/20 21:29   





 


 Diphenhydramine


 HCl


  (Benadryl)  25 mg  Q6HR  PRN


 PO


 ITCHING  12/24/19 21:30


 12/25/19 09:12 DC  





 


 Polyethylene


 Glycol


  (Miralax)  17 gm  DAILY  PRN


 PO


 CONSTIPATION  12/24/19 21:30


 1/23/20 21:29   





 


 Simethicone


  (Genasyme)  80 mg  Q6HR  PRN


 PO


 GAS  12/24/19 21:30


 1/23/20 21:29   





 


 Ondansetron HCl


  (Zofran 4 Mg/2


 ml Vial)  4 mg  Q4H  PRN


 IV


 NAUSEA / VOMITING  12/24/19 21:30


 1/23/20 21:29   





 


 Alprazolam


  (Xanax)  0.25 mg  Q6H  PRN


 PO


 ANXIETY  12/24/19 21:30


 1/23/20 21:29   





 


 Tramadol HCl


  (Ultram)  50 mg  Q4HR  PRN


 PO


 PAIN 4 - 6  12/24/19 21:30


 1/23/20 21:29   





 


 Acetaminophen


  (Tylenol)  650 mg  Q4H  PRN


 PO


 PAIN  12/24/19 21:30


 1/23/20 21:29   





 


 Diltiazem HCl


  (Cardizem)  125 mg  STK-MED ONCE


 .ROUTE


   12/24/19 22:48


 12/24/19 22:50 DC  





 


 Sodium Chloride  100 ml @ ud  STK-MED ONCE


 IV


   12/24/19 22:49


 12/24/19 22:51 DC  





 


 Sodium Chloride  100 ml @ ud  STK-MED ONCE


 IV


   12/25/19 03:23


 12/25/19 03:25 DC  





 


 Sodium Chloride  1,000 ml @ 


 ud  STK-MED ONCE


 .ROUTE


   12/25/19 08:40


 12/25/19 08:41 DC  





 


 Sodium Chloride  1,000 ml @ 


 80 mls/hr  [id number]


 IV


   12/25/19 09:00


 1/24/20 08:59  12/26/19 05:22





 


 Magnesium Sulfate  50 ml @ 50


 mls/hr  OT  ONCE


 IV


   12/25/19 11:00


 12/25/19 11:59 DC 12/25/19 12:15





 


 Carvedilol


  (Coreg)  3.125 mg  STK-MED ONCE


 .ROUTE


   12/25/19 10:49


 12/25/19 10:51 DC  





 


 Carvedilol


  (Coreg)  3.125 mg  BID


 PO


   12/25/19 11:00


 1/24/20 10:59  12/26/19 08:24





 


 Lisinopril


  (Zestril)  20 mg  DAILY


 PO


   12/26/19 09:00


 1/25/20 08:59  12/26/19 08:24





 


 Furosemide


  (Lasix)  40 mg  DAILY


 IV


   12/25/19 11:30


 1/24/20 11:29  12/26/19 08:25





 


 Diltiazem HCl


  (Cardizem)  125 mg  STK-MED ONCE


 .ROUTE


   12/25/19 12:27


 12/25/19 12:29 DC  





 


 Sodium Chloride  100 ml @ ud  STK-MED ONCE


 IV


   12/25/19 12:28


 12/25/19 12:30 DC  





 


 Diltiazem HCl 125


 mg/Sodium Chloride  125 ml @ 0


 mls/hr  IV


 


   12/25/19 13:00


 1/24/20 12:59   





 


 Enoxaparin Sodium


  (Lovenox)  40 mg  Q24HRS


 SQ


   12/25/19 21:00


 1/24/20 20:59  12/25/19 21:00





 


 Sodium Chloride  100 ml @ ud  STK-MED ONCE


 IV


   12/25/19 15:22


 12/25/19 15:24 DC  





 


 Ceftriaxone Sodium


  (Rocephin)  1 gm  STK-MED ONCE


 .ROUTE


   12/25/19 15:22


 12/25/19 15:24 DC  





 


 Azithromycin 500


 mg/Sodium Chloride  250 ml @ 


 175 mls/hr  Q24HRS


 IV


   12/25/19 21:00


 1/24/20 20:59  12/25/19 21:40





 


 Lisinopril


  (Zestril)  20 mg  STK-MED ONCE


 .ROUTE


   12/25/19 17:51


 12/25/19 17:53 DC  





 


 Sodium Chloride  250 ml @ ud  STK-MED ONCE


 IV


   12/25/19 21:37


 12/25/19 21:39 DC  





 


 Heparin Sodium/


 Sodium Chloride  1,500 ml @ 


 ud  STK-MED ONCE


 IV


   12/26/19 07:23


 12/26/19 07:25 DC  





 


 Heparin Sodium


  (Porcine)


  (Heparin)  5,000 unit  STK-MED ONCE


 .ROUTE


   12/26/19 07:23


 12/26/19 07:25 DC  





 


 Fentanyl Citrate


  (Sublimaze)  100 mcg  STK-MED ONCE


 .ROUTE


   12/26/19 07:23


 12/26/19 07:25 DC  





 


 Lidocaine HCl


  (Xylocaine)  20 mg  STK-MED ONCE


 .ROUTE


   12/26/19 07:23


 12/26/19 07:25 DC  





 


 Benzocaine/


 Butamben/


 Tetracaine HCl


  (Cetacaine Spray)  0.209208


 sprays  STK-MED ONCE


 TP


   12/26/19 07:24


 12/26/19 07:26 DC  





 


 Fentanyl Citrate


  (Sublimaze)  100 mcg  STK-MED ONCE


 .ROUTE


   12/26/19 08:45


 12/26/19 08:47 DC  





 


 Amiodarone HCl


  (Nexterone)  150 mg  STK-MED ONCE


 .ROUTE


   12/26/19 10:39


 12/26/19 10:41 DC  





 


 Amiodarone HCL/


 Dextrose  200 ml @ ud  STK-MED ONCE


 IV


   12/26/19 10:39


 12/26/19 10:42 DC  





 


 Potassium Chloride


  (Potassium


 Chloride)  60 meq  OT  ONCE


 PO


   12/26/19 12:00


 12/26/19 12:07 DC  














Assessment/Plan


Patient underwent HERMAN today which revealed moderate TR with LVEF 25-30%


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


He will need lifevest before discharge-- has been consulted


Currently on amiodarone gtt for rate control


Will switch to PO amiodarone in AM


Will wean off cardizem gtt


Continue coreg, lisinopril and aspirin


Anticipate d/c tomorrow AFTER lifevest has been fitted.











BROOKLYN MCGRATH DO              Dec 26, 2019 12:33

## 2019-12-26 NOTE — NUR
LIFE VEST UPDATE



CAROLANN LINTON WITH [facility] PHONED TINO IN ICU AND STATED, "SINCE PATIENT'S FACE SHEET SHOWED HE HAS 
INSURANCE HE HAS THE APPROVAL TO COME FIT THE PATIENT.  AS SOON AS HE CAN GET IN CONTACT 
WITH A NURSE TO COME FIT PATIENT TODAY OR TOMORROW THEY WILL FIT HIM AND HE CAN DISCHARGE 
FROM THE HOSPITAL IF NEEDED."  TINO INFORMED CAROLANN THAT PATIENT DOES NOT HAVE INSURANCE AND 
THAT THE FACE SHEET HAD NOT BEEN UPDATED.  CAROLANN STATED, "I AM GOING TO GO AHEAD AND GET HIM 
FITTED AND THEN WE WILL DEAL WITH THE APPLICATION AND THE $250 AFTER THE PATIENT IS DENIED.  
I AM 99% SURE THE PATIENT WILL QUALIFY FOR THE PATIENT ASSIST PROGRAM, BUT IT COULD TAKE A 
FEW DAYS AND WE ARE SHORT STAFFED WITH THE HOLIDAYS."  TINO NOTIFIED SANTO AMIN THAT CAROLANN WOULD 
CALL THE PATIENT IN A "COUPLE OF HOURS TO INFORM PATIENT OF THE STEPS CONCERNED WITH THE 
LIFE VEST.  TINO ALSO LEFT THE ELIQUIS 30 DAY FREE COUPON CARD, APPLICATION, AND PRESCRIPTION 
NEEDED FOR DR. SNOW TO SIGN IF HE DECIDES THE PATIENT IS DISCHARGING ON ELIQUIS.

## 2019-12-27 VITALS — DIASTOLIC BLOOD PRESSURE: 77 MMHG | SYSTOLIC BLOOD PRESSURE: 116 MMHG

## 2019-12-27 VITALS — SYSTOLIC BLOOD PRESSURE: 128 MMHG | DIASTOLIC BLOOD PRESSURE: 76 MMHG

## 2019-12-27 VITALS — DIASTOLIC BLOOD PRESSURE: 64 MMHG | SYSTOLIC BLOOD PRESSURE: 106 MMHG

## 2019-12-27 VITALS — SYSTOLIC BLOOD PRESSURE: 119 MMHG | DIASTOLIC BLOOD PRESSURE: 90 MMHG

## 2019-12-27 VITALS — SYSTOLIC BLOOD PRESSURE: 114 MMHG | DIASTOLIC BLOOD PRESSURE: 79 MMHG

## 2019-12-27 VITALS — DIASTOLIC BLOOD PRESSURE: 78 MMHG | SYSTOLIC BLOOD PRESSURE: 119 MMHG

## 2019-12-27 VITALS — SYSTOLIC BLOOD PRESSURE: 109 MMHG | DIASTOLIC BLOOD PRESSURE: 67 MMHG

## 2019-12-27 VITALS — DIASTOLIC BLOOD PRESSURE: 61 MMHG | SYSTOLIC BLOOD PRESSURE: 125 MMHG

## 2019-12-27 VITALS — SYSTOLIC BLOOD PRESSURE: 121 MMHG | DIASTOLIC BLOOD PRESSURE: 88 MMHG

## 2019-12-27 VITALS — DIASTOLIC BLOOD PRESSURE: 45 MMHG | SYSTOLIC BLOOD PRESSURE: 76 MMHG

## 2019-12-27 VITALS — SYSTOLIC BLOOD PRESSURE: 116 MMHG | DIASTOLIC BLOOD PRESSURE: 77 MMHG

## 2019-12-27 VITALS — DIASTOLIC BLOOD PRESSURE: 71 MMHG | SYSTOLIC BLOOD PRESSURE: 120 MMHG

## 2019-12-27 VITALS — SYSTOLIC BLOOD PRESSURE: 106 MMHG | DIASTOLIC BLOOD PRESSURE: 67 MMHG

## 2019-12-27 VITALS — SYSTOLIC BLOOD PRESSURE: 97 MMHG | DIASTOLIC BLOOD PRESSURE: 60 MMHG

## 2019-12-27 VITALS — DIASTOLIC BLOOD PRESSURE: 84 MMHG | SYSTOLIC BLOOD PRESSURE: 114 MMHG

## 2019-12-27 VITALS — SYSTOLIC BLOOD PRESSURE: 113 MMHG | DIASTOLIC BLOOD PRESSURE: 54 MMHG

## 2019-12-27 VITALS — DIASTOLIC BLOOD PRESSURE: 73 MMHG | SYSTOLIC BLOOD PRESSURE: 138 MMHG

## 2019-12-27 LAB
CALCIUM SERPL-MCNC: 8.5 MG/DL (ref 8.4–10.5)
CALCIUM SERPL-MCNC: 9.2 MG/DL (ref 8.4–10.5)
CO2 BLDA-SCNC: 23 MMOL/L (ref 20–32)
CO2 BLDA-SCNC: 28.4 MMOL/L (ref 20–32)
GLUCOSE PRE 100 G GLC PO SERPL-MCNC: 112 MG/DL (ref 70–110)
GLUCOSE PRE 100 G GLC PO SERPL-MCNC: 91 MG/DL (ref 70–110)

## 2019-12-27 RX ADMIN — FUROSEMIDE SCH MG: 10 INJECTION INTRAVENOUS at 08:31

## 2019-12-27 RX ADMIN — CARVEDILOL SCH MG: 3.12 TABLET, FILM COATED ORAL at 08:31

## 2019-12-27 RX ADMIN — LISINOPRIL SCH MG: 20 TABLET ORAL at 08:30

## 2019-12-27 RX ADMIN — ASPIRIN TAB DELAYED RELEASE 81 MG SCH MG: 81 TABLET DELAYED RESPONSE at 08:30

## 2019-12-27 RX ADMIN — SODIUM CHLORIDE SCH MLS/HR: 0.9 INJECTION, SOLUTION INTRAVENOUS at 11:00

## 2019-12-27 NOTE — NUR
Dr. Zhou Epperson on phone to verify gtt instructions. Updated on current vital signs. New orders 
received to administer Eliquis 5mg po x1, Amiodarone 200mg po x1 and to stop admiodarone gtt 
and wean Cardizem gtt off. RBTO.

## 2019-12-27 NOTE — PRM.DC
Discharge Summary


Date of Discharge:  Dec 27, 2019


Hospital Course


Pt has hx of non-complaince with medications for heart failure, and was admitted

for CAP, and AFIB w/RVR. Pt was admitted to ICU and placed on cardizem gtt. 

Cardiology was consulted:


Patient underwent HERMAN  which revealed moderate TR with LVEF 25-30%


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


He will need lifevest before discharge-- has been consulted


Currently on amiodarone gtt for rate control


Will switch to PO amiodarone on D/C


 wean off cardizem gtt


Continue coreg, lisinopril and aspirin


Patient History:  


FH: esophageal cancer


  32 MOTHER, 


FH: suicide


  33 FATHER, , Age:30's - 40


Scheduled


Aspirin (Aspirin Ec), 81 MG PO DAILY


[Magnesium Oxide], 400 MG PO BID


Sepsis Evaluation @ Discharge


             


19 


                                     


                                     


19:00





Course


Sepsis Screening Results: Posi:  NEGATIVE


Sepsis Qualifier/Stage:  NO DEFINITE RISK


Duration or Total Time Spent w:  20m


Vitals & review Data





Vital Sign - Last 24 Hours








 19





 08:24 08:24 08:25 08:30


 


Temp    98.1


 


Pulse 107   121


 


Resp    20


 


B/P (MAP) 144/66 144/66 144/66 130/77 (94)


 


Pulse Ox    90


 


    





 19





 08:45 09:00 09:16 09:30


 


Pulse 114 134 117 113


 


Resp 27  28 25


 


B/P (MAP) 133/89 (104) 134/89 (104) 141/93 (109) 141/90 (107)


 


Pulse Ox 96 99 93 94





 19





 09:45 12:23 12:29 12:46


 


Pulse 106  98 119


 


Resp 24   22


 


B/P (MAP) 138/100 (113)  115/59 (77) 121/68 (85)


 


Pulse Ox 94  96 92


 


O2 Delivery  Room Air  





 19





 13:00 13:15 13:30 13:47


 


Temp 98.6   


 


Pulse   109 127


 


Resp 9 22 10 25


 


B/P (MAP) 115/75 (88) 108/72 (84) 143/67 (92) 127/113 (118)


 


Pulse Ox 95 94 95 96


 


    





 19





 14:06 14:30 14:45 15:00


 


Pulse 122 98 100 107


 


Resp  19  17


 


B/P (MAP) 98/61 (73) 106/69 (81)  106/70 (82)


 


Pulse Ox 95 96 95 94





 19





 15:30 15:45 16:00 16:15


 


Pulse 108 111 101 105


 


Resp 25  17 24


 


B/P (MAP) 122/75 (91)  116/71 (86) 


 


Pulse Ox 95 93 92 93





 19





 16:29 16:30 16:45 17:00


 


Temp  99.7  


 


Pulse  108 96 99


 


Resp   21 


 


B/P (MAP)  112/79 (90)  112/75 (87)


 


Pulse Ox  92 93 94


 


O2 Delivery Room Air   


 


    





 19





 17:15 17:30 17:45 18:00


 


Pulse 113 107 96 107


 


Resp    20


 


B/P (MAP)  127/83 (98)  128/87 (101)


 


Pulse Ox   92 94





 19





 18:15 18:30 19:00 20:00


 


Pulse 115 111  110


 


Resp  21  67


 


B/P (MAP)  123/79 (94)  


 


Pulse Ox 94 94  97


 


O2 Delivery   Room Air 





 19





 20:02 20:31 21:00 21:00


 


Pulse 108 124 111 109


 


Resp  20  19


 


B/P (MAP) 151/84 (106) 108/68 (81) 123/79 


 


Pulse Ox 97 95  95





 19





 21:01 21:32 21:42 22:00


 


Pulse 103 119 107 100


 


Resp 28  15 31


 


B/P (MAP) 130/95 (107)  113/69 (84) 110/69 (83)


 


Pulse Ox 95 95 96 94





 1919





 22:30 23:00 23:30 00:00


 


Pulse 100 85 92 94


 


Resp 21 30 29 


 


B/P (MAP) 115/83 (94) 111/72 (85) 109/56 (73) 119/78 (92)


 


Pulse Ox 94 92 93 93





 19





 00:00 00:30 01:00 01:30


 


Pulse  96 95 113


 


Resp  12 9 29


 


B/P (MAP)  138/73 (94) 106/67 (80) 113/54 (73)


 


Pulse Ox  94 93 92


 


O2 Delivery Room Air   





 19





 02:00 02:01 02:30 02:33


 


Pulse 102 105 80 117


 


Resp   26 20


 


B/P (MAP)  76/45 (55)  


 


Pulse Ox 90 92  





 19





 03:00 03:30 04:00 04:00


 


Pulse 99 101 102 


 


Resp 22 22 22 


 


Pulse Ox 91 93 90 


 


O2 Delivery    Room Air





 19





 04:08 04:30 04:32 05:07


 


Pulse 104   112


 


Resp 26   


 


B/P (MAP) 109/67 (81)  128/76 (93) 


 


Pulse Ox 93 97 93 





 19 





 05:30 06:00 06:30 


 


Pulse 103 100 102 


 


Resp 25 97  


 


Pulse Ox 96 94  














Intake and Output   


 


 19





 15:00 23:00 07:00


 


Intake Total  2013 ml 1700 ml


 


Output Total  2625 ml 800 ml


 


Balance  -612 ml 900 ml








Laboratory Tests








Test


 19


12:55 19


04:50


 


White Blood Count 10.7 10^3/uL  


 


Red Blood Count 5.02 10^6/uL  


 


Hemoglobin 16.5 g/dL  


 


Hematocrit 49.0 %  


 


Mean Corpuscular Volume 97.6 fL  


 


Mean Corpuscular Hemoglobin 32.9 pg  


 


Mean Corpuscular Hemoglobin


Concent 33.7 g/dL 


 





 


Red Cell Distribution Width 14.9 %  


 


Platelet Count 207 10^3/uL  


 


Mean Platelet Volume 9.3 fL  


 


Neutrophils (%) (Auto) 78.0 %  


 


Lymphocytes (%) (Auto) 11.8 %  


 


Monocytes (%) (Auto) 9.5 %  


 


Neutrophils # (Auto) 8.4 10^3/uL  


 


Lymphocytes # (Auto) 1.3 10^3/uL  


 


Monocytes # (Auto) 1.0 10^3/uL  


 


Absolute Immature Granulocyte


(auto 0.03 10^3 u/L 


 





 


Immature Granulocytes % 0.30 %  


 


Eosinophils % 0.1 %  


 


Basophils % 0.3 %  


 


Basophils # 0.0 10^3/uL  


 


Eosinophil Count 0.0 10^3/uL  


 


Prothrombin Time 11.3 SEC  


 


Prothrombin Time INR


(Non-Therap) 1.1 


 





 


Activated Partial


Thromboplast Time 27.4 SEC 


 





 


Sodium Level 138 mmol/L  138 mmol/L 


 


Potassium Level 3.5 mmol/L  3.3 mmol/L 


 


Chloride Level 101.0 mmol/L  105.0 mmol/L 


 


Carbon Dioxide Level 26.2 mmol/L  23.0 mmol/L 


 


Glucose Level 134 mg/dL  112 mg/dL 


 


Blood Urea Nitrogen 9 mg/dL  10 mg/dL 


 


Creatinine 1.00 mg/dL  0.89 mg/dL 


 


Calcium Level 9.2 mg/dL  8.5 mg/dL 


 


Anion Gap 14.3  13.3 


 


Estimated GFR (


American) 100.7 


 115.1 





 


BUN/Creatinine Ratio 9.0  11.0 








                               Current Medications








 Medications


  (Trade)  Dose


 Ordered  Sig/Foreign


 PRN Reason  Start Time


 Stop Time Status Last Admin


 


 Acetaminophen


  (Tylenol)  650 mg  Q4H  PRN


 PAIN  19 21:30


 20 21:29   





 


 Alprazolam


  (Xanax)  0.25 mg  Q6H  PRN


 ANXIETY  19 21:30


 20 21:29   





 


 Aspirin


  (Aspirin Ec)  81 mg  DAILY


   19 09:00


 20 08:59  19 08:24





 


 Azithromycin 500


 mg/Sodium Chloride  250 ml @ 


 175 mls/hr  Q24HRS


   19 21:00


 20 20:59  19 21:00





 


 Carvedilol


  (Coreg)  3.125 mg  BID


   19 11:00


 20 10:59  19 21:00





 


 Ceftriaxone


 Sodium 1 gm/


 Sodium Chloride  100 ml @ 


 100 mls/hr  Q24HRS


   19 16:00


 20 15:59  19 17:23





 


 Diltiazem HCl 125


 mg/Sodium Chloride  125 ml @ 0


 mls/hr  IV


   19 13:00


 20 12:59  19 05:07





 


 Diphenhydramine


 HCl


  (Benadryl)  25 mg  Q6HR  PRN


 INSOMNIA  19 21:30


 20 21:29   





 


 Enoxaparin Sodium


  (Lovenox)  40 mg  Q24HRS


   19 21:00


 20 20:59  19 21:00





 


 Furosemide


  (Lasix)  40 mg  DAILY


   19 11:30


 20 11:29  19 08:25





 


 Lisinopril


  (Zestril)  20 mg  DAILY


   19 09:00


 20 08:59  19 08:24





 


 Ondansetron HCl


  (Zofran 4 Mg/2


 ml Vial)  4 mg  Q4H  PRN


 NAUSEA / VOMITING  19 21:30


 20 21:29   





 


 Polyethylene


 Glycol


  (Miralax)  17 gm  DAILY  PRN


 CONSTIPATION  19 21:30


 20 21:29   





 


 Simethicone


  (Genasyme)  80 mg  Q6HR  PRN


 GAS  19 21:30


 20 21:29   





 


 Sodium Chloride  1,000 ml @ 


 80 mls/hr  [id number]


   19 09:00


 20 08:59  19 21:26





 


 Tramadol HCl


  (Ultram)  50 mg  Q4HR  PRN


 PAIN 4 - 6  19 21:30


 20 21:29   











LEVEL 1 SEPSIS INFECTION CRITE:  ABX Therapy


LEVEL 2-SIRS (LIST ALL THAT AP:  HR>90/min


Cardiovascular Evidence:  Not Assessed or None


Hematologic Evidence:  None/Not assessed


Hepatic Evidence:  None/Not assessed


Metabolic Evidence:  None/Not assessed


Neurological Evidence:  None/Not assessed


Respiratory Evidence:  None/Not assessed


Renal Evidence:  None/Not assessed


O2 Sat by Pulse Oximetry:  94


LAB RESULTS





Laboratory Tests








Test


 19


04:50


 


Sodium Level 138 mmol/L 


 


Potassium Level 3.3 mmol/L 


 


Chloride Level 105.0 mmol/L 


 


Carbon Dioxide Level 23.0 mmol/L 


 


Glucose Level 112 mg/dL 


 


Blood Urea Nitrogen 10 mg/dL 


 


Creatinine 0.89 mg/dL 


 


Calcium Level 8.5 mg/dL 


 


Anion Gap 13.3 


 


Estimated GFR (


American) 115.1 





 


BUN/Creatinine Ratio 11.0 








Current Medications








 Medications


  (Trade)  Dose


 Ordered  Sig/Foreign


 Route


 PRN Reason  Start Time


 Stop Time Status Last Admin


Dose Admin


 


 Diltiazem HCl


  (Cardizem)  60 mg  STK-MED ONCE


 .ROUTE


   19 12:50


 19 12:52 DC  





 


 Diltiazem HCl


  (Cardizem)  25 mg  STK-MED ONCE


 IV


   19 12:50


 19 12:52 DC  





 


 Diltiazem HCl


  (Cardizem)  20 mg  STAT  STAT


 IV


   19 12:53


 19 12:55 DC 19 13:00





 


 Diltiazem HCl


  (Cardizem)  30 mg  STAT  STAT


 PO


   19 12:53


 19 12:55 DC 19 13:00





 


 Diltiazem HCl


  (Cardizem)  25 mg  STK-MED ONCE


 IV


   19 13:14


 19 13:16 DC  





 


 Diltiazem HCl


  (Cardizem)  25 mg  STAT  STAT


 IV


   19 13:20


 19 13:30 DC 19 13:20





 


 Diltiazem HCl


  (Cardizem)  125 mg  STK-MED ONCE


 .ROUTE


   19 13:48


 19 13:49 DC  





 


 Sodium Chloride  100 ml @ ud  STK-MED ONCE


 IV


   19 13:49


 19 13:51 DC  





 


 Diltiazem HCl


  (Cardizem)  125 mg  STAT  STAT


 IV


   19 14:09


 19 14:11 DC 19 14:10





 


 Ceftriaxone


 Sodium 1 gm/


 Sodium Chloride  100 ml @ 


 100 mls/hr  Q24HRS


 IV


   19 16:00


 20 15:59  19 17:23





 


 Ceftriaxone Sodium


  (Rocephin)  1 gm  STK-MED ONCE


 .ROUTE


   19 16:47


 19 16:48 DC  





 


 Sodium Chloride  100 ml @ ud  STK-MED ONCE


 IV


   19 16:47


 19 16:49 DC  





 


 Diltiazem HCl


  (Cardizem)  125 mg  OT  ONCE


 IV


   19 17:00


 19 17:01 DC 19 23:00





 


 Ceftriaxone


 Sodium 1 gm/


 Sodium Chloride  100 ml @ 


 100 mls/hr  DAILY


 IV


   19 09:00


 19 20:42 DC  





 


 Azithromycin 500


 mg/Sodium Chloride  250 ml @ 


 175 mls/hr  Q24HRS


 IV


   19 17:00


 19 16:46 DC 19 21:40





 


 Aspirin


  (Aspirin Ec)  81 mg  DAILY


 PO


   19 09:00


 20 08:59  19 08:24





 


 Enoxaparin Sodium


  (Lovenox)  40 mg  Q24HRS


 SQ


   19 18:30


 19 15:03 DC 19 21:30





 


 Sodium Chloride  1,000 ml @ 


 80 mls/hr  [id number] ONCE


 IV


   19 18:30


 19 06:59 DC  





 


 Sodium Chloride  250 ml @   STK-MED ONCE


 IV


   19 21:16


 19 21:18 DC  





 


 Diphenhydramine


 HCl


  (Benadryl)  25 mg  Q6HR  PRN


 PO


 INSOMNIA  19 21:30


 20 21:29   





 


 Diphenhydramine


 HCl


  (Benadryl)  25 mg  Q6HR  PRN


 PO


 ITCHING  19 21:30


 19 09:12 DC  





 


 Polyethylene


 Glycol


  (Miralax)  17 gm  DAILY  PRN


 PO


 CONSTIPATION  19 21:30


 20 21:29   





 


 Simethicone


  (Genasyme)  80 mg  Q6HR  PRN


 PO


 GAS  19 21:30


 20 21:29   





 


 Ondansetron HCl


  (Zofran 4 Mg/2


 ml Vial)  4 mg  Q4H  PRN


 IV


 NAUSEA / VOMITING  19 21:30


 20 21:29   





 


 Alprazolam


  (Xanax)  0.25 mg  Q6H  PRN


 PO


 ANXIETY  19 21:30


 20 21:29   





 


 Tramadol HCl


  (Ultram)  50 mg  Q4HR  PRN


 PO


 PAIN 4 - 6  19 21:30


 20 21:29   





 


 Acetaminophen


  (Tylenol)  650 mg  Q4H  PRN


 PO


 PAIN  19 21:30


 20 21:29   





 


 Diltiazem HCl


  (Cardizem)  125 mg  STK-MED ONCE


 .ROUTE


   19 22:48


 19 22:50 DC  





 


 Sodium Chloride  100 ml @ ud  STK-MED ONCE


 IV


   19 22:49


 19 22:51 DC  





 


 Sodium Chloride  100 ml @ ud  STK-MED ONCE


 IV


   19 03:23


 19 03:25 DC  





 


 Sodium Chloride  1,000 ml @ 


 ud  STK-MED ONCE


 .ROUTE


   19 08:40


 19 08:41 DC  





 


 Sodium Chloride  1,000 ml @ 


 80 mls/hr  [id number]


 IV


   19 09:00


 20 08:59  19 21:26





 


 Magnesium Sulfate  50 ml @ 50


 mls/hr  OT  ONCE


 IV


   19 11:00


 19 11:59 DC 19 12:15





 


 Carvedilol


  (Coreg)  3.125 mg  STK-MED ONCE


 .ROUTE


   19 10:49


 19 10:51 DC  





 


 Carvedilol


  (Coreg)  3.125 mg  BID


 PO


   19 11:00


 20 10:59  19 21:00





 


 Lisinopril


  (Zestril)  20 mg  DAILY


 PO


   19 09:00


 20 08:59  19 08:24





 


 Furosemide


  (Lasix)  40 mg  DAILY


 IV


   19 11:30


 20 11:29  19 08:25





 


 Diltiazem HCl


  (Cardizem)  125 mg  STK-MED ONCE


 .ROUTE


   19 12:27


 19 12:29 DC  





 


 Sodium Chloride  100 ml @ ud  STK-MED ONCE


 IV


   19 12:28


 19 12:30 DC  





 


 Diltiazem HCl 125


 mg/Sodium Chloride  125 ml @ 0


 mls/hr  IV


 


   19 13:00


 20 12:59  19 05:07





 


 Enoxaparin Sodium


  (Lovenox)  40 mg  Q24HRS


 SQ


   19 21:00


 20 20:59  19 21:00





 


 Sodium Chloride  100 ml @ ud  STK-MED ONCE


 IV


   19 15:22


 19 15:24 DC  





 


 Ceftriaxone Sodium


  (Rocephin)  1 gm  STK-MED ONCE


 .ROUTE


   19 15:22


 19 15:24 DC  





 


 Azithromycin 500


 mg/Sodium Chloride  250 ml @ 


 175 mls/hr  Q24HRS


 IV


   19 21:00


 20 20:59  19 21:00





 


 Lisinopril


  (Zestril)  20 mg  STK-MED ONCE


 .ROUTE


   19 17:51


 19 17:53 DC  





 


 Sodium Chloride  250 ml @ ud  STK-MED ONCE


 IV


   19 21:37


 19 21:39 DC  





 


 Heparin Sodium/


 Sodium Chloride  1,500 ml @ 


 ud  STK-MED ONCE


 IV


   19 07:23


 19 07:25 DC  





 


 Heparin Sodium


  (Porcine)


  (Heparin)  5,000 unit  STK-MED ONCE


 .ROUTE


   19 07:23


 19 07:25 DC  





 


 Fentanyl Citrate


  (Sublimaze)  100 mcg  STK-MED ONCE


 .ROUTE


   19 07:23


 19 07:25 DC  





 


 Lidocaine HCl


  (Xylocaine)  20 mg  STK-MED ONCE


 .ROUTE


   19 07:23


 19 07:25 DC  





 


 Benzocaine/


 Butamben/


 Tetracaine HCl


  (Cetacaine Spray)  [id number]


 sprays  STK-MED ONCE


 TP


   19 07:24


 19 07:26 DC  





 


 Fentanyl Citrate


  (Sublimaze)  100 mcg  STK-MED ONCE


 .ROUTE


   19 08:45


 19 08:47 DC  





 


 Amiodarone HCl


  (Nexterone)  150 mg  STK-MED ONCE


 .ROUTE


   19 10:39


 19 10:41 DC  





 


 Amiodarone HCL/


 Dextrose  200 ml @ ud  STK-MED ONCE


 IV


   19 10:39


 19 10:42 DC  





 


 Potassium Chloride


  (Potassium


 Chloride)  60 meq  OT  ONCE


 PO


   19 12:00


 19 12:07 DC 19 13:05





 


 Amiodarone HCL/


 Dextrose  200 ml @ ud  STK-MED ONCE


 IV


   19 22:58


 19 23:01 DC  





 


 Sodium Chloride  100 ml @ ud  STK-MED ONCE


 IV


   19 00:04


 19 00:07 DC  





 


 Diltiazem HCl


  (Cardizem)  125 mg  STK-MED ONCE


 .ROUTE


   19 00:05


 19 00:07 DC  





 


 Amiodarone HCL/


 Dextrose  200 ml @ ud  STK-MED ONCE


 IV


   19 04:53


 19 04:55 DC  





 


 Potassium Chloride


  (Potassium


 Chloride)  80 meq  STAT  ONCE


 PO


   19 07:30


 19 07:31 UNV 19 07:21














Plan


Problems:  


(1) CAP (community acquired pneumonia)


Status:  Acute


ICD Code:  J18.9 - Pneumonia, unspecified organism


SNOMED:  851560897


(2) Hypokalemia


Status:  Resolved


ICD Code:  E87.6 - Hypokalemia


SNOMED:  70949974


(3) A-fib


ICD Code:  I48.91 - Unspecified atrial fibrillation


SNOMED:  81882864


Plan


Patient underwent HERMAN  which revealed moderate TR with LVEF 25-30%


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


needed lifevest before discharge


converted to PO amiodarone 


Continue coreg, lisinopril and aspirin


f/u with Cardiology











JOSR TROTTER DO               Dec 27, 2019 08:20

## 2019-12-27 NOTE — NUR
Discharge

Discharge instructions given to pt. Educated pt on importance of follow up apt with Dr. Epperson, pt verbalized understanding. Educated pt on post-heart cath discharge instructions 
related to right radial site, pt verbalized understanding. Educated pt on new medications 
and side effects, pt verbalized understanding. Instructed pt to wear lifevest at all times 
besides when showering, pt verbalized understanding. Discontinued IVs, catheter tips intact. 
No edema, redness, heat or pain noted. Covered site with cotton ball and coban. Answered all 
of pts questions. Excuse from work note provided. Medications called in to [facility] pharmacy. 
Pt transferred off unit via wheelchair to private vehicle. No s/s of distress noted.

## 2020-05-15 ENCOUNTER — HOSPITAL ENCOUNTER (INPATIENT)
Dept: HOSPITAL 65 - ER | Age: 40
LOS: 1 days | Discharge: HOME | DRG: 308 | End: 2020-05-16
Attending: INTERNAL MEDICINE | Admitting: INTERNAL MEDICINE
Payer: COMMERCIAL

## 2020-05-15 VITALS — DIASTOLIC BLOOD PRESSURE: 77 MMHG | SYSTOLIC BLOOD PRESSURE: 119 MMHG

## 2020-05-15 VITALS — DIASTOLIC BLOOD PRESSURE: 70 MMHG | SYSTOLIC BLOOD PRESSURE: 117 MMHG

## 2020-05-15 VITALS — DIASTOLIC BLOOD PRESSURE: 61 MMHG | SYSTOLIC BLOOD PRESSURE: 103 MMHG

## 2020-05-15 VITALS — SYSTOLIC BLOOD PRESSURE: 117 MMHG | DIASTOLIC BLOOD PRESSURE: 68 MMHG

## 2020-05-15 VITALS — SYSTOLIC BLOOD PRESSURE: 118 MMHG | DIASTOLIC BLOOD PRESSURE: 69 MMHG

## 2020-05-15 VITALS — DIASTOLIC BLOOD PRESSURE: 77 MMHG | SYSTOLIC BLOOD PRESSURE: 128 MMHG

## 2020-05-15 VITALS — SYSTOLIC BLOOD PRESSURE: 130 MMHG | DIASTOLIC BLOOD PRESSURE: 78 MMHG

## 2020-05-15 VITALS — SYSTOLIC BLOOD PRESSURE: 117 MMHG | DIASTOLIC BLOOD PRESSURE: 61 MMHG

## 2020-05-15 VITALS — SYSTOLIC BLOOD PRESSURE: 112 MMHG | DIASTOLIC BLOOD PRESSURE: 80 MMHG

## 2020-05-15 VITALS — SYSTOLIC BLOOD PRESSURE: 118 MMHG | DIASTOLIC BLOOD PRESSURE: 86 MMHG

## 2020-05-15 VITALS — DIASTOLIC BLOOD PRESSURE: 70 MMHG | SYSTOLIC BLOOD PRESSURE: 120 MMHG

## 2020-05-15 VITALS — SYSTOLIC BLOOD PRESSURE: 128 MMHG | DIASTOLIC BLOOD PRESSURE: 69 MMHG

## 2020-05-15 VITALS — DIASTOLIC BLOOD PRESSURE: 107 MMHG | SYSTOLIC BLOOD PRESSURE: 156 MMHG

## 2020-05-15 VITALS — DIASTOLIC BLOOD PRESSURE: 101 MMHG | SYSTOLIC BLOOD PRESSURE: 148 MMHG

## 2020-05-15 VITALS — DIASTOLIC BLOOD PRESSURE: 70 MMHG | SYSTOLIC BLOOD PRESSURE: 115 MMHG

## 2020-05-15 VITALS — DIASTOLIC BLOOD PRESSURE: 89 MMHG | SYSTOLIC BLOOD PRESSURE: 148 MMHG

## 2020-05-15 VITALS — SYSTOLIC BLOOD PRESSURE: 131 MMHG | DIASTOLIC BLOOD PRESSURE: 69 MMHG

## 2020-05-15 VITALS — SYSTOLIC BLOOD PRESSURE: 124 MMHG | DIASTOLIC BLOOD PRESSURE: 63 MMHG

## 2020-05-15 VITALS — SYSTOLIC BLOOD PRESSURE: 118 MMHG | DIASTOLIC BLOOD PRESSURE: 76 MMHG

## 2020-05-15 VITALS — SYSTOLIC BLOOD PRESSURE: 108 MMHG | DIASTOLIC BLOOD PRESSURE: 66 MMHG

## 2020-05-15 VITALS — SYSTOLIC BLOOD PRESSURE: 109 MMHG | DIASTOLIC BLOOD PRESSURE: 59 MMHG

## 2020-05-15 VITALS — SYSTOLIC BLOOD PRESSURE: 152 MMHG | DIASTOLIC BLOOD PRESSURE: 79 MMHG

## 2020-05-15 VITALS — SYSTOLIC BLOOD PRESSURE: 121 MMHG | DIASTOLIC BLOOD PRESSURE: 68 MMHG

## 2020-05-15 VITALS — SYSTOLIC BLOOD PRESSURE: 138 MMHG | DIASTOLIC BLOOD PRESSURE: 85 MMHG

## 2020-05-15 VITALS — WEIGHT: 225 LBS | BODY MASS INDEX: 33.33 KG/M2 | HEIGHT: 69 IN

## 2020-05-15 VITALS — DIASTOLIC BLOOD PRESSURE: 65 MMHG | SYSTOLIC BLOOD PRESSURE: 109 MMHG

## 2020-05-15 VITALS — DIASTOLIC BLOOD PRESSURE: 71 MMHG | SYSTOLIC BLOOD PRESSURE: 132 MMHG

## 2020-05-15 VITALS — SYSTOLIC BLOOD PRESSURE: 108 MMHG | DIASTOLIC BLOOD PRESSURE: 60 MMHG

## 2020-05-15 VITALS — SYSTOLIC BLOOD PRESSURE: 113 MMHG | DIASTOLIC BLOOD PRESSURE: 70 MMHG

## 2020-05-15 VITALS — DIASTOLIC BLOOD PRESSURE: 73 MMHG | SYSTOLIC BLOOD PRESSURE: 123 MMHG

## 2020-05-15 VITALS — SYSTOLIC BLOOD PRESSURE: 143 MMHG | DIASTOLIC BLOOD PRESSURE: 112 MMHG

## 2020-05-15 VITALS — DIASTOLIC BLOOD PRESSURE: 88 MMHG | SYSTOLIC BLOOD PRESSURE: 130 MMHG

## 2020-05-15 VITALS — DIASTOLIC BLOOD PRESSURE: 69 MMHG | SYSTOLIC BLOOD PRESSURE: 102 MMHG

## 2020-05-15 VITALS — DIASTOLIC BLOOD PRESSURE: 74 MMHG | SYSTOLIC BLOOD PRESSURE: 156 MMHG

## 2020-05-15 VITALS — DIASTOLIC BLOOD PRESSURE: 64 MMHG | SYSTOLIC BLOOD PRESSURE: 113 MMHG

## 2020-05-15 VITALS — DIASTOLIC BLOOD PRESSURE: 79 MMHG | SYSTOLIC BLOOD PRESSURE: 115 MMHG

## 2020-05-15 VITALS — DIASTOLIC BLOOD PRESSURE: 60 MMHG | SYSTOLIC BLOOD PRESSURE: 108 MMHG

## 2020-05-15 VITALS — DIASTOLIC BLOOD PRESSURE: 52 MMHG | SYSTOLIC BLOOD PRESSURE: 114 MMHG

## 2020-05-15 VITALS — SYSTOLIC BLOOD PRESSURE: 127 MMHG | DIASTOLIC BLOOD PRESSURE: 80 MMHG

## 2020-05-15 VITALS — SYSTOLIC BLOOD PRESSURE: 139 MMHG | DIASTOLIC BLOOD PRESSURE: 90 MMHG

## 2020-05-15 VITALS — DIASTOLIC BLOOD PRESSURE: 79 MMHG | SYSTOLIC BLOOD PRESSURE: 120 MMHG

## 2020-05-15 VITALS — SYSTOLIC BLOOD PRESSURE: 97 MMHG | DIASTOLIC BLOOD PRESSURE: 69 MMHG

## 2020-05-15 VITALS — SYSTOLIC BLOOD PRESSURE: 112 MMHG | DIASTOLIC BLOOD PRESSURE: 77 MMHG

## 2020-05-15 VITALS — SYSTOLIC BLOOD PRESSURE: 121 MMHG | DIASTOLIC BLOOD PRESSURE: 85 MMHG

## 2020-05-15 VITALS — DIASTOLIC BLOOD PRESSURE: 73 MMHG | SYSTOLIC BLOOD PRESSURE: 107 MMHG

## 2020-05-15 VITALS — DIASTOLIC BLOOD PRESSURE: 98 MMHG | SYSTOLIC BLOOD PRESSURE: 130 MMHG

## 2020-05-15 VITALS — DIASTOLIC BLOOD PRESSURE: 81 MMHG | SYSTOLIC BLOOD PRESSURE: 121 MMHG

## 2020-05-15 VITALS — SYSTOLIC BLOOD PRESSURE: 117 MMHG | DIASTOLIC BLOOD PRESSURE: 63 MMHG

## 2020-05-15 VITALS — DIASTOLIC BLOOD PRESSURE: 80 MMHG | SYSTOLIC BLOOD PRESSURE: 126 MMHG

## 2020-05-15 VITALS — SYSTOLIC BLOOD PRESSURE: 133 MMHG | DIASTOLIC BLOOD PRESSURE: 87 MMHG

## 2020-05-15 VITALS — SYSTOLIC BLOOD PRESSURE: 121 MMHG | DIASTOLIC BLOOD PRESSURE: 66 MMHG

## 2020-05-15 VITALS — DIASTOLIC BLOOD PRESSURE: 80 MMHG | SYSTOLIC BLOOD PRESSURE: 135 MMHG

## 2020-05-15 VITALS — DIASTOLIC BLOOD PRESSURE: 105 MMHG | SYSTOLIC BLOOD PRESSURE: 127 MMHG

## 2020-05-15 VITALS — SYSTOLIC BLOOD PRESSURE: 129 MMHG | DIASTOLIC BLOOD PRESSURE: 87 MMHG

## 2020-05-15 VITALS — DIASTOLIC BLOOD PRESSURE: 71 MMHG | SYSTOLIC BLOOD PRESSURE: 110 MMHG

## 2020-05-15 VITALS — SYSTOLIC BLOOD PRESSURE: 140 MMHG | DIASTOLIC BLOOD PRESSURE: 79 MMHG

## 2020-05-15 VITALS — SYSTOLIC BLOOD PRESSURE: 131 MMHG | DIASTOLIC BLOOD PRESSURE: 88 MMHG

## 2020-05-15 VITALS — DIASTOLIC BLOOD PRESSURE: 89 MMHG | SYSTOLIC BLOOD PRESSURE: 126 MMHG

## 2020-05-15 DIAGNOSIS — E66.9: ICD-10-CM

## 2020-05-15 DIAGNOSIS — E78.5: ICD-10-CM

## 2020-05-15 DIAGNOSIS — I48.20: Primary | ICD-10-CM

## 2020-05-15 DIAGNOSIS — F12.90: ICD-10-CM

## 2020-05-15 DIAGNOSIS — Z82.49: ICD-10-CM

## 2020-05-15 DIAGNOSIS — G47.33: ICD-10-CM

## 2020-05-15 DIAGNOSIS — I25.10: ICD-10-CM

## 2020-05-15 DIAGNOSIS — F32.9: ICD-10-CM

## 2020-05-15 DIAGNOSIS — Z87.891: ICD-10-CM

## 2020-05-15 DIAGNOSIS — I11.0: ICD-10-CM

## 2020-05-15 DIAGNOSIS — Z91.19: ICD-10-CM

## 2020-05-15 DIAGNOSIS — I42.8: ICD-10-CM

## 2020-05-15 DIAGNOSIS — I50.23: ICD-10-CM

## 2020-05-15 DIAGNOSIS — K21.9: ICD-10-CM

## 2020-05-15 DIAGNOSIS — F41.9: ICD-10-CM

## 2020-05-15 LAB
ALP INTEST CFR SERPL: 66 U/L (ref 50–136)
ALT SERPL-CCNC: 64 U/L (ref 12–78)
AST SERPL-CCNC: 108 U/L (ref 0–35)
BASOPHILS # BLD AUTO: 0.1 10^3/UL (ref 0–0.1)
BASOPHILS NFR BLD AUTO: 1.1 % (ref 0–0.2)
CALCIUM SERPL-MCNC: 9 MG/DL (ref 8.4–10.5)
CO2 BLDA-SCNC: 24.2 MMOL/L (ref 20–32)
EOSINOPHIL # BLD AUTO: 0.1 10^3/UL (ref 0–0.2)
EOSINOPHIL NFR BLD AUTO: 1.1 % (ref 0–5)
ERYTHROCYTE [DISTWIDTH] IN BLOOD BY AUTOMATED COUNT: 13 % (ref 11.5–14.5)
GLUCOSE PRE 100 G GLC PO SERPL-MCNC: 104 MG/DL (ref 70–110)
LYMPHOCYTES # BLD AUTO: 1.88 10^3/UL1 (ref 1–4.8)
LYMPHOCYTES NFR BLD AUTO: 30.1 % (ref 24–44)
MCH RBC QN AUTO: 34 PG (ref 26–34)
MONOCYTES # BLD AUTO: 1 10^3/UL (ref 0.3–0.8)
MONOCYTES NFR BLD AUTO: 15.7 % (ref 5–12)
NEUTROPHILS # BLD AUTO: 3.2 10^3/UL (ref 1.8–7.7)
NEUTROPHILS NFR BLD AUTO: 51.7 % (ref 41–85)
PLATELET # BLD AUTO: 205 10^3/UL (ref 150–400)

## 2020-05-15 PROCEDURE — 85610 PROTHROMBIN TIME: CPT

## 2020-05-15 PROCEDURE — 80053 COMPREHEN METABOLIC PANEL: CPT

## 2020-05-15 PROCEDURE — 36415 COLL VENOUS BLD VENIPUNCTURE: CPT

## 2020-05-15 PROCEDURE — 99291 CRITICAL CARE FIRST HOUR: CPT

## 2020-05-15 PROCEDURE — 80320 DRUG SCREEN QUANTALCOHOLS: CPT

## 2020-05-15 PROCEDURE — 85730 THROMBOPLASTIN TIME PARTIAL: CPT

## 2020-05-15 PROCEDURE — 83880 ASSAY OF NATRIURETIC PEPTIDE: CPT

## 2020-05-15 PROCEDURE — 71045 X-RAY EXAM CHEST 1 VIEW: CPT

## 2020-05-15 PROCEDURE — 82553 CREATINE MB FRACTION: CPT

## 2020-05-15 PROCEDURE — 82550 ASSAY OF CK (CPK): CPT

## 2020-05-15 PROCEDURE — 84484 ASSAY OF TROPONIN QUANT: CPT

## 2020-05-15 PROCEDURE — 93005 ELECTROCARDIOGRAM TRACING: CPT

## 2020-05-15 PROCEDURE — 85025 COMPLETE CBC W/AUTO DIFF WBC: CPT

## 2020-05-15 PROCEDURE — 93306 TTE W/DOPPLER COMPLETE: CPT

## 2020-05-15 RX ADMIN — LISINOPRIL SCH MG: 20 TABLET ORAL at 11:00

## 2020-05-15 RX ADMIN — SOTALOL HYDROCHLORIDE SCH MG: 80 TABLET ORAL at 21:00

## 2020-05-15 RX ADMIN — APIXABAN SCH MG: 5 TABLET, FILM COATED ORAL at 21:00

## 2020-05-15 RX ADMIN — SOTALOL HYDROCHLORIDE SCH MG: 80 TABLET ORAL at 09:46

## 2020-05-15 RX ADMIN — APIXABAN SCH MG: 5 TABLET, FILM COATED ORAL at 11:00

## 2020-05-15 RX ADMIN — DILTIAZEM HYDROCHLORIDE SCH MG: 60 TABLET, FILM COATED ORAL at 21:00

## 2020-05-15 RX ADMIN — DILTIAZEM HYDROCHLORIDE SCH MG: 60 TABLET, FILM COATED ORAL at 15:27

## 2020-05-15 NOTE — NUR
SBAR REPORT RECEIVED FROM FREDDY AMIN.  INTRODUCED SELF, ASSUMED CARE.  NO NEEDS VOICED AT 
THIS TIME.

## 2020-05-15 NOTE — PCM.HP
HISTORY & PHYSICAL


HISTORY & PHYSICAL


DATE: May 15, 2020





Patient is admitted to ICU as an inpatient





ADMITTING DIAGNOSES: Rapid atrial fibrillation, nonischemic cardiomyopathy, 

hypertension





CHIEF COMPLAINT: Palpitations





HISTORY OF PRESENT ILLNESS: 39-year-old gentleman with atrial fibrillation and 

nonischemic cardiomyopathy who presents to the ER feeling that his heart is 

racing and having palpitations.  He was found to be in rapid A. fib at the time 

and medications were initiated in the ER.  He denies any chest pain to me 

currently and he feels fine.  He states that he does wear a LifeVest at home 

since December 2019 and is scheduled to be seen by his cardiologist Dr. Epperson.





PAST MEDICAL HISTORY: Hypertension, nonischemic cardiomyopathy, atrial 

fibrillation





PAST SURGICAL HISTORY: Heart caths





ALLERGIES: No known drug allergies





MEDICATIONS: I have reviewed his home medication list in [facility]





SOCIAL HISTORY: He does have a history of alcohol use, he does smoke





FAMILY HISTORY: Noncontributory for this admission





PHYSICAL EXAMINATION:


VITAL SIGNS: Temperature 98.4, pulse 100 and it is irregular, respirations 15, 

blood pressure 139/90, O2 sat of 97%


HEENT: Oropharynx is clear, moist mucous membranes


NECK: Supple


HEART: S1 and S2 audible, irregular rhythm


LUNGS: CTA bilaterally


ABDOMEN: Bowel sounds present, soft abdomen, no masses


EXTREMITIES: No pitting edema, no cyanosis, 2+ distal pulses noted





LABORATORY DATA: CBC normal, sodium 135, potassium 4.5, BUN 23, creatinine 1.63,

, total bilirubin 1.3, proBNP 2703, troponin I less than 0.02, coags 

normal, serum alcohol level less than 3





EKG in the ER: Atrial fibrillation with rapid ventricular response noted





Chest x-ray: No acute cardiopulmonary issues noted





ASSESSMENT: We have this young gentleman with rapid A. fib with underlying 

nonischemic cardiomyopathy





PLAN: His heart rate is improved at this point and will continue his home 

medications.  Cardiology has been consulted and echo has been performed.











MARY ANNE AGUIRRE MD              May 15, 2020 12:52

## 2020-05-15 NOTE — NUR
ARRIVAL

PATIENT ARRIVED TO ED2 AMBULATORY, C/O OF AFIB TODAY WITH MINIMAL SHORTNESS OF 
BREATH, DOES HAVE A HISTORY AND SEE DOCTOR ALCIDES, CAME TO THE ED FOR EVAL, 
DOCTOR KEVIN IN ROOM AT THIS TIME TALKING WITH PATIENT.

## 2020-05-15 NOTE — PCM.EKG
[facility]

                                       

Test Date:    2020-05-15               Test Time:    06:51:15

Pat Name:     SAMUEL COLLINS            Department:   

Patient ID:   [id number]          Room:         ICU8

Gender:       M                        Technician:   AZ

:          1980               Requested By: EDDI BROWN

Order Number: [id number]           Reading MD:   Eddi BROWN

                                 Measurements

Intervals                              Axis          

Rate:         143                      P:            

SD:                                    QRS:          24

QRSD:         105                      T:            253

QT:           280                                    

QTc:          432                                    

                           Interpretive Statements

Atrial fibrillation

Nonspecific repol abnormality, inferior leads

Baseline wander in lead(s) II,aVF

Compared to ECG 2020 09:50:49

Early repolarization now present

Sinus rhythm no longer present

ST (T wave) deviation no longer present



Electronically Signed On 2020 5:55:56 CDT by Eddi BROWN



Please click the below link to view image of tracing.

## 2020-05-15 NOTE — DIREP
PROCEDURE:CHEST 1 VIEW

 

COMPARISON:[facility], CR, XRAY CHEST SINGLE VW, 12/24/2019, 

12:44 PM.  [facility], CR, XRAY CHEST SINGLE VW, 12/17/2016, 09:18 

AM.

 

INDICATIONS:Palpitations

 

FINDINGS:

LUNGS/PLEURA:No significant pulmonary parenchymal abnormalities. No effusions.

VASCULATURE:Normal.  Unremarkable pulmonary vasculature.

CARDIAC:Normal.  No cardiac silhouette abnormality or cardiomegaly. 

MEDIASTINUM:Normal.  No visible mass or adenopathy. 

BONES:Normal.  No fracture or visible bony lesion. 

OTHER:Negative.  

 

CONCLUSION:No acute airspace disease 

 

 

 

Dictated by: Walker Reese DO on 05/15/2020 at 07:32 AM     

Electronically Signed By: Walker Reese DO on 05/15/2020 at 07:33 AM

## 2020-05-15 NOTE — CNH
DATE OF CONSULTATION:  05/15/2020



REASON FOR CONSULTATION:  Atrial fibrillation with rapid ventricular

response/acute decompensated heart failure.



HISTORY OF PRESENT ILLNESS:  This is a 39-year-old male well known to my

service, who presented to the Emergency Department with symptoms of persistent

palpitations with associated shortness of breath.  On presentation to the ED,

EKG revealed atrial fibrillation with rapid ventricular response.  He denies any

chest pain.  His BNP was noted to be 2703.  Troponin was negative x 1.  He was

given 20 mg of Cardizem IV in the ED that brought his rate down to the 90s to

110.  Consultation has been placed to Cardiology service for evaluation.



PAST MEDICAL HISTORY:

Significant for:

1.  Nonischemic cardiomyopathy -- the patient was put on a LifeVest 12/2019, but

has been noncompliant with LifeVest usage.  He is currently not wearing his

LifeVest.

2.  Chronic atrial fibrillation.

3.  Hypertension.

4.  Chronic systolic heart failure.

5.  Anxiety/depression.

6.  Hyperlipidemia.

7.  Obstructive sleep apnea.

8.  GERD.



PAST SURGICAL HISTORY:  Cardiac catheterization done 12/2019, which revealed

nonischemic cardiomyopathy with left ventricular ejection fraction noted to be

25-30% in 12/2019.



ALLERGIES:  No known drug allergies.



MEDICATIONS:  He takes at home include sotalol 40 mg p.o. b.i.d., Eliquis 5 mg

b.i.d., Prilosec 20 mg OTC, lisinopril 20 mg daily.



SOCIAL HISTORY:  Uses alcohol socially.  Denies illicit drug use.  Admits to

tobacco use.



FAMILY HISTORY:  Admits the family history of premature CAD, but denies sudden

cardiac death.



REVIEW OF SYSTEMS:  As per HPI and as per previous records.  All systems

reviewed and negative for interval change.



PHYSICAL EXAMINATION:

VITAL SIGNS:  Blood pressure is 114/52, respiratory rate is 20, pulse is 115, O2

sat is 97% on room air.

GENERAL:  He is in no apparent distress, alert and oriented x 3.

HEENT:  Normocephalic, atraumatic.  Extraocular muscles intact.  Pupils equally

round, reactive to light and accommodation.  No thyromegaly.  No jugular venous

distention is seen.

HEART:  S1, S2.  No gallops, murmurs, rubs, or clicks.

LUNGS:  Decreased breath sounds bilaterally.

ABDOMEN:  Soft, nontender, nondistended.  Positive bowel sounds in all 4

quadrants.  Obese.

EXTREMITIES:  No cyanosis, no clubbing, no edema.

NEUROLOGIC:  No neurological deficits.  Sensation is intact.



IMPRESSION:

1.  Atrial fibrillation with rapid ventricular response.

2.  Mild decompensated systolic heart failure.

3.  Nonischemic cardiomyopathy with a left ventricular ejection fraction of

25-30%, seen on 2D echo done 12/2019.

4.  Left heart catheterization done 12/2019 revealed nonobstructive coronary

artery disease.

5.  History of chronic systolic heart failure.

6.  Hypertension.

7.  Anxiety/depression.

8.  Obesity.

9.  Hyperlipidemia.

10.  Obstructive sleep apnea.

11.  Gastroesophageal reflux disease.



RECOMMENDATION:  This is a 39-year-old male who presented to [facility] with persistent palpitations and shortness of breath, was found

to be in rapid atrial fibrillation with mildly decompensated heart failure. 

Lasix 20 mg IV has been given.  He is going to be started on his home

medications including sotalol 40 mg p.o. b.i.d. as well as Eliquis 5 mg p.o.

b.i.d.  I am also going to start him on Cardizem 60 mg p.o. t.i.d. for

optimization of his rate control meds.  He has a known history of nonischemic

cardiomyopathy with an LVEF of 25-30%.  He was initially put on a LifeVest in

12/2019; however, he is extremely noncompliant with LifeVest usage.  I will

repeat a 2D echo this admission to reevaluate his LVEF.  If his LVEF is less

than or equal to 35%, he would benefit from AICD implantation.  In the interim,

I will continue his cardiac medications.  He will be kept in the ICU for close

monitoring.  Further recommendations will be made based on his overall clinical

course.





______________________________

BROOKLYN MCGRATH D.O. DR:  AVA/rocky  JOB# [id number]  [id number]

DD:  05/15/2020 15:37  DT:  05/15/2020 16:06

## 2020-05-15 NOTE — ER.PDOC
General


Chief Complaint:  Chest Pain-Cardiac Nature


Stated Complaint:  A-FIB


Time seen by MD:  07:12


Source:  patient


Exam Limitations:  no limitations





History of Present Illness


Initial Comments


Palpitations this morning, patient has chronic A fib.


Timing/Duration:  4-6 hours


Quality:  fast, irregular


Activities at Onset:  none


Associated Symptoms:  chest pain/discomfort


Prior symptoms/Treatment:  Similar symptoms previous, Recenly Seen, Treated by 

Doctor, Recently Hospitalized


Allergies:  


Coded Allergies:  


     No Known Allergies (Unverified , 12/17/16)


Home Meds


Active Scripts


Sotalol Hcl (SOTALOL) 80 Mg Tablet, 0.5 TAB PO BID, #30 TAB 3 Refills


   Prov:BROOKLYN EPPERSON DO         1/16/20


Apixaban (Eliquis) 5 Mg Tablet, 5 MG PO BID for 30 Days, #60 3 Refills


   Prov:JOSR TROTTER DO         12/27/19


Lisinopril (LISINOPRIL) 20 Mg Tablet, 20 MG PO DAILY for 30 Days, #30 TAB


   Prov:JOSR TROTTER DO         12/27/19


Reported Medications


Omeprazole Magnesium (PRILOSEC OTC) 20 Mg Tablet.dr, 1 TAB PO QD for 30 Days, #3

0 TAB 0 Refills


   5/15/20





Past Medical History


Medical History:  cardiac problems, hypertension


Surgical History:  cardiac cath





Social History


Alcohol Use:  occassionally


Drug Use:  marijuana





Constitutional:  no symptoms reported


Respiratory:  no symptoms reported


Cardiovascular:  see HPI


Gastrointestinal:  no symptoms reported


Genitourinary:  no symptoms reported


All Other Systems:  Reviewed and Negative





Physical Exam


General Appearance:  No Apparent Distress, WD/WN


Neck:  Non-Tender, Full Range of Motion, Supple, Normal Inspection


Respiratory:  chest non-tender, lungs clear, normal breath sounds, no 

respiratory distress, no accessory muscle use


Cardiovascular:  No Edema, No Gallop, No JVD, No Murmur, Tachycardia, 

Irregularly Irregular


Gastrointestinal:  Normal Bowel Sounds, No Organomegaly, No Pulsatile Mass, Non 

Tender, Soft


Extremities:  Normal Range of Motion, Non-Tender, Normal Inspection, No Pedal 

Edema, No Calf Tenderness, Normal Capillary Refill


Neurologic/Psychiatric:  CNs II-XII NML as Tested, No Motor/Sensory Deficits, 

Alert, Normal Mood/Affect, Oriented x 3


Skin:  Normal Color, Warm/Dry





Results/Orders


Results/Orders





Vital Signs








  Date Time  Temp Pulse Resp B/P (MAP) Pulse Ox O2 Delivery O2 Flow Rate FiO2


 


5/15/20 07:04 98.5 157 21 156/107 (123) 100 Room Air  


 


5/15/20 07:01 98.5 157 21  100   


 


5/15/20 07:01 98.5 157 21     











EKG/XRAY/CT/US


EKG Comments:  A fib with RVR


XRAY:  chest (No active disease)





Departure


Time of Disposition:  08:22


Disposition:  09 ADMITTED AS INPATIENT


Impression:  


   Primary Impression:  


   Atrial fibrillation with rapid ventricular response


Condition:  Stable


Referrals:  


PCP,UNKNOWN (PCP)


PRIMARY CARE PROVIDER


Comments


Admitted to Dr. Perez, spoke with Dr. Epperson who will consult.


Duration or Time Spent with Pa:  60 min





Critical Care Note


Total Time (mins):  60











EDDI BROWN MD                May 15, 2020 07:15

## 2020-05-15 NOTE — NUR
DISCHARGE PLAN 



CASE MANAGEMENT VISITED WITH PATIENT CONCERNING DISCHARGE PLAN AND NEEDS.  LIVES AT HOME 
ALONE.  INDEPENDENT OF ADLS.  HE HAS DME INCLUDING A LIFE VEST.  PATIENT STATED, "I ONLY 
WEAR IT WHEN IM WORKING." PATIENT ALSO STATED, "I GET MY ELIQUIS FOR FREE.  IT IS DELIVERED 
TO MY HOUSE EVERY MONTH.  I DO TAKE MY MEDICATIONS."  DENIES OTHER NEEDS AT THIS TIME.  
DISCHARGE PLAN IS TO DISCHARGE HOME AND CONTINUE SELF CARE.  CM LEFT CONTACT INFORMATION AT 
BEDSIDE AND WILL CONTINUE TO FOLLOW FOR DISCHARGE NEEDS.

## 2020-05-15 NOTE — NUR
DR. MCGRATH AT BEDSIDE.

NEW ORDERS RECEIVED FOR ECHO, LASIX 20MG IV OT, CONTINUE HOME MEDICATIONS, ALCOHOL LEVEL. 
RBVO.

## 2020-05-16 VITALS — DIASTOLIC BLOOD PRESSURE: 59 MMHG | SYSTOLIC BLOOD PRESSURE: 97 MMHG

## 2020-05-16 VITALS — DIASTOLIC BLOOD PRESSURE: 81 MMHG | SYSTOLIC BLOOD PRESSURE: 121 MMHG

## 2020-05-16 VITALS — DIASTOLIC BLOOD PRESSURE: 55 MMHG | SYSTOLIC BLOOD PRESSURE: 110 MMHG

## 2020-05-16 VITALS — SYSTOLIC BLOOD PRESSURE: 110 MMHG | DIASTOLIC BLOOD PRESSURE: 59 MMHG

## 2020-05-16 VITALS — SYSTOLIC BLOOD PRESSURE: 84 MMHG | DIASTOLIC BLOOD PRESSURE: 38 MMHG

## 2020-05-16 VITALS — SYSTOLIC BLOOD PRESSURE: 106 MMHG | DIASTOLIC BLOOD PRESSURE: 65 MMHG

## 2020-05-16 VITALS — SYSTOLIC BLOOD PRESSURE: 122 MMHG | DIASTOLIC BLOOD PRESSURE: 74 MMHG

## 2020-05-16 VITALS — SYSTOLIC BLOOD PRESSURE: 86 MMHG | DIASTOLIC BLOOD PRESSURE: 48 MMHG

## 2020-05-16 VITALS — DIASTOLIC BLOOD PRESSURE: 40 MMHG | SYSTOLIC BLOOD PRESSURE: 88 MMHG

## 2020-05-16 VITALS — SYSTOLIC BLOOD PRESSURE: 104 MMHG | DIASTOLIC BLOOD PRESSURE: 43 MMHG

## 2020-05-16 VITALS — SYSTOLIC BLOOD PRESSURE: 102 MMHG | DIASTOLIC BLOOD PRESSURE: 61 MMHG

## 2020-05-16 VITALS — DIASTOLIC BLOOD PRESSURE: 66 MMHG | SYSTOLIC BLOOD PRESSURE: 120 MMHG

## 2020-05-16 VITALS — SYSTOLIC BLOOD PRESSURE: 98 MMHG | DIASTOLIC BLOOD PRESSURE: 67 MMHG

## 2020-05-16 VITALS — DIASTOLIC BLOOD PRESSURE: 113 MMHG | SYSTOLIC BLOOD PRESSURE: 154 MMHG

## 2020-05-16 VITALS — DIASTOLIC BLOOD PRESSURE: 57 MMHG | SYSTOLIC BLOOD PRESSURE: 118 MMHG

## 2020-05-16 VITALS — SYSTOLIC BLOOD PRESSURE: 103 MMHG | DIASTOLIC BLOOD PRESSURE: 63 MMHG

## 2020-05-16 VITALS — DIASTOLIC BLOOD PRESSURE: 63 MMHG | SYSTOLIC BLOOD PRESSURE: 123 MMHG

## 2020-05-16 VITALS — DIASTOLIC BLOOD PRESSURE: 62 MMHG | SYSTOLIC BLOOD PRESSURE: 99 MMHG

## 2020-05-16 VITALS — DIASTOLIC BLOOD PRESSURE: 47 MMHG | SYSTOLIC BLOOD PRESSURE: 111 MMHG

## 2020-05-16 VITALS — DIASTOLIC BLOOD PRESSURE: 74 MMHG | SYSTOLIC BLOOD PRESSURE: 124 MMHG

## 2020-05-16 VITALS — DIASTOLIC BLOOD PRESSURE: 68 MMHG | SYSTOLIC BLOOD PRESSURE: 125 MMHG

## 2020-05-16 VITALS — SYSTOLIC BLOOD PRESSURE: 100 MMHG | DIASTOLIC BLOOD PRESSURE: 41 MMHG

## 2020-05-16 VITALS — SYSTOLIC BLOOD PRESSURE: 97 MMHG | DIASTOLIC BLOOD PRESSURE: 55 MMHG

## 2020-05-16 VITALS — SYSTOLIC BLOOD PRESSURE: 110 MMHG | DIASTOLIC BLOOD PRESSURE: 61 MMHG

## 2020-05-16 VITALS — DIASTOLIC BLOOD PRESSURE: 58 MMHG | SYSTOLIC BLOOD PRESSURE: 119 MMHG

## 2020-05-16 VITALS — DIASTOLIC BLOOD PRESSURE: 64 MMHG | SYSTOLIC BLOOD PRESSURE: 109 MMHG

## 2020-05-16 VITALS — DIASTOLIC BLOOD PRESSURE: 54 MMHG | SYSTOLIC BLOOD PRESSURE: 99 MMHG

## 2020-05-16 VITALS — DIASTOLIC BLOOD PRESSURE: 60 MMHG | SYSTOLIC BLOOD PRESSURE: 116 MMHG

## 2020-05-16 VITALS — SYSTOLIC BLOOD PRESSURE: 102 MMHG | DIASTOLIC BLOOD PRESSURE: 56 MMHG

## 2020-05-16 VITALS — SYSTOLIC BLOOD PRESSURE: 112 MMHG | DIASTOLIC BLOOD PRESSURE: 65 MMHG

## 2020-05-16 VITALS — SYSTOLIC BLOOD PRESSURE: 109 MMHG | DIASTOLIC BLOOD PRESSURE: 60 MMHG

## 2020-05-16 VITALS — DIASTOLIC BLOOD PRESSURE: 75 MMHG | SYSTOLIC BLOOD PRESSURE: 125 MMHG

## 2020-05-16 VITALS — DIASTOLIC BLOOD PRESSURE: 62 MMHG | SYSTOLIC BLOOD PRESSURE: 119 MMHG

## 2020-05-16 VITALS — DIASTOLIC BLOOD PRESSURE: 71 MMHG | SYSTOLIC BLOOD PRESSURE: 114 MMHG

## 2020-05-16 VITALS — SYSTOLIC BLOOD PRESSURE: 117 MMHG | DIASTOLIC BLOOD PRESSURE: 66 MMHG

## 2020-05-16 VITALS — SYSTOLIC BLOOD PRESSURE: 100 MMHG | DIASTOLIC BLOOD PRESSURE: 64 MMHG

## 2020-05-16 VITALS — DIASTOLIC BLOOD PRESSURE: 74 MMHG | SYSTOLIC BLOOD PRESSURE: 118 MMHG

## 2020-05-16 VITALS — SYSTOLIC BLOOD PRESSURE: 94 MMHG | DIASTOLIC BLOOD PRESSURE: 52 MMHG

## 2020-05-16 VITALS — SYSTOLIC BLOOD PRESSURE: 110 MMHG | DIASTOLIC BLOOD PRESSURE: 42 MMHG

## 2020-05-16 VITALS — DIASTOLIC BLOOD PRESSURE: 52 MMHG | SYSTOLIC BLOOD PRESSURE: 101 MMHG

## 2020-05-16 VITALS — DIASTOLIC BLOOD PRESSURE: 61 MMHG | SYSTOLIC BLOOD PRESSURE: 100 MMHG

## 2020-05-16 VITALS — SYSTOLIC BLOOD PRESSURE: 99 MMHG | DIASTOLIC BLOOD PRESSURE: 52 MMHG

## 2020-05-16 VITALS — DIASTOLIC BLOOD PRESSURE: 63 MMHG | SYSTOLIC BLOOD PRESSURE: 113 MMHG

## 2020-05-16 VITALS — SYSTOLIC BLOOD PRESSURE: 114 MMHG | DIASTOLIC BLOOD PRESSURE: 67 MMHG

## 2020-05-16 VITALS — SYSTOLIC BLOOD PRESSURE: 95 MMHG | DIASTOLIC BLOOD PRESSURE: 49 MMHG

## 2020-05-16 VITALS — SYSTOLIC BLOOD PRESSURE: 105 MMHG | DIASTOLIC BLOOD PRESSURE: 57 MMHG

## 2020-05-16 VITALS — SYSTOLIC BLOOD PRESSURE: 96 MMHG | DIASTOLIC BLOOD PRESSURE: 63 MMHG

## 2020-05-16 VITALS — DIASTOLIC BLOOD PRESSURE: 62 MMHG | SYSTOLIC BLOOD PRESSURE: 93 MMHG

## 2020-05-16 VITALS — SYSTOLIC BLOOD PRESSURE: 101 MMHG | DIASTOLIC BLOOD PRESSURE: 50 MMHG

## 2020-05-16 RX ADMIN — LISINOPRIL SCH MG: 20 TABLET ORAL at 09:42

## 2020-05-16 RX ADMIN — DILTIAZEM HYDROCHLORIDE SCH MG: 60 TABLET, FILM COATED ORAL at 09:42

## 2020-05-16 RX ADMIN — SOTALOL HYDROCHLORIDE SCH MG: 80 TABLET ORAL at 09:41

## 2020-05-16 RX ADMIN — APIXABAN SCH MG: 5 TABLET, FILM COATED ORAL at 09:41

## 2020-05-16 NOTE — NUR
PATIENT CALLED UNIT AND STATED [facility] 

DID NOT HAVE PRESCRIPTION IN STOCK. ASKED TO CALL PRESCRIPTION IN TO [facility]. CALLED [facility] AND 
LEFT VOICEMAIL. CALLED [facility] TO CANCEL PRESCRIPTION.

## 2020-05-16 NOTE — NUR
DISCHARGE INSTRUCTIONS AND NEW PRESCRIPTION REVIEWED. 

VERBALIZED UNDERSTANDING. PRESCRIPTION CALLED IN TO [facility]. IV CATHETER 
DISCONTINUED. PATIENT DRESSED SELF AND GATHERED BELONGINGS.

## 2020-05-16 NOTE — PRM.DC
DISCHARGE SUMMARY


DISCHARGE SUMMARY


DATE OF ADMISSION: May 15, 2020





DATE OF DISCHARGE: May 16, 2020





ADMITTING DIAGNOSES: Rapid atrial fibrillation with history of nonischemic 

cardiomyopathy





DISCHARGE DIAGNOSES: Atrial fibrillation with history of nonischemic 

cardiomyopathy





DISCHARGE DISPOSITION: Patient is discharged to home





DISCHARGE CONDITION: Stable





HOSPITAL COURSE: 39-year-old gentleman with history of nonischemic 

cardiomyopathy and chronic atrial fibrillation who came in in rapid A. fib.  He 

has been taking his regular cardiac medications at home and cardiology was 

consulted and started him on Cardizem.  His heart rate is improved and he is 

having no chest pains.  An echo showed an EF around 40% which is improved from 

25% from the last time he was in for rapid A. fib.  At this point he feels fine 

and wants to go home.





DIET: Cardiac





ACTIVITY: As tolerated





MEDICATIONS: 1.  Resume home medications


      2.  Cardizem 60 mg p.o. 3 times daily





FOLLOW-UP: Follow-up with Dr. Epperson in 2 weeks











MARY ANNE AGUIRRE MD              May 16, 2020 11:40

## 2021-07-30 ENCOUNTER — HOSPITAL ENCOUNTER (OUTPATIENT)
Dept: HOSPITAL 65 - ER | Age: 41
Setting detail: OBSERVATION
LOS: 1 days | Discharge: HOME | End: 2021-07-31
Attending: INTERNAL MEDICINE | Admitting: INTERNAL MEDICINE
Payer: COMMERCIAL

## 2021-07-30 VITALS — SYSTOLIC BLOOD PRESSURE: 137 MMHG | DIASTOLIC BLOOD PRESSURE: 94 MMHG

## 2021-07-30 VITALS — WEIGHT: 218 LBS | HEIGHT: 70 IN | BODY MASS INDEX: 31.21 KG/M2

## 2021-07-30 VITALS — DIASTOLIC BLOOD PRESSURE: 87 MMHG | SYSTOLIC BLOOD PRESSURE: 119 MMHG

## 2021-07-30 VITALS — DIASTOLIC BLOOD PRESSURE: 86 MMHG | SYSTOLIC BLOOD PRESSURE: 137 MMHG

## 2021-07-30 VITALS — SYSTOLIC BLOOD PRESSURE: 139 MMHG | DIASTOLIC BLOOD PRESSURE: 98 MMHG

## 2021-07-30 VITALS — SYSTOLIC BLOOD PRESSURE: 128 MMHG | DIASTOLIC BLOOD PRESSURE: 105 MMHG

## 2021-07-30 VITALS — SYSTOLIC BLOOD PRESSURE: 125 MMHG | DIASTOLIC BLOOD PRESSURE: 68 MMHG

## 2021-07-30 DIAGNOSIS — Z91.14: ICD-10-CM

## 2021-07-30 DIAGNOSIS — Z86.16: ICD-10-CM

## 2021-07-30 DIAGNOSIS — Z91.19: ICD-10-CM

## 2021-07-30 DIAGNOSIS — Z20.822: ICD-10-CM

## 2021-07-30 DIAGNOSIS — I48.20: Primary | ICD-10-CM

## 2021-07-30 DIAGNOSIS — F17.210: ICD-10-CM

## 2021-07-30 DIAGNOSIS — Z79.899: ICD-10-CM

## 2021-07-30 DIAGNOSIS — I25.10: ICD-10-CM

## 2021-07-30 LAB
ALP INTEST CFR SERPL: 111 U/L (ref 50–136)
ALT SERPL-CCNC: 21 U/L (ref 12–78)
AST SERPL-CCNC: 29 U/L (ref 0–35)
BASOPHILS # BLD AUTO: 0 10^3/UL (ref 0–0.1)
BASOPHILS NFR BLD AUTO: 0.7 % (ref 0–0.2)
CALCIUM SERPL-MCNC: 8.7 MG/DL (ref 8.4–10.5)
CO2 BLDA-SCNC: 24.4 MMOL/L (ref 20–32)
EOSINOPHIL # BLD AUTO: 0.1 10^3/UL (ref 0–0.2)
EOSINOPHIL NFR BLD AUTO: 1.2 % (ref 0–5)
ERYTHROCYTE [DISTWIDTH] IN BLOOD BY AUTOMATED COUNT: 13.7 % (ref 11.5–14.5)
GLUCOSE PRE 100 G GLC PO SERPL-MCNC: 106 MG/DL (ref 70–110)
LYMPHOCYTES # BLD AUTO: 1.71 10^3/UL1 (ref 1–4.8)
LYMPHOCYTES NFR BLD AUTO: 29.5 % (ref 24–44)
MCH RBC QN AUTO: 34.3 PG (ref 26–34)
MONOCYTES # BLD AUTO: 0.6 10^3/UL (ref 0.3–0.8)
MONOCYTES NFR BLD AUTO: 10.2 % (ref 5–12)
NEUTROPHILS # BLD AUTO: 3.4 10^3/UL (ref 1.8–7.7)
NEUTROPHILS NFR BLD AUTO: 58.2 % (ref 41–85)
PLATELET # BLD AUTO: 183 10^3/UL (ref 150–400)

## 2021-07-30 PROCEDURE — 99291 CRITICAL CARE FIRST HOUR: CPT

## 2021-07-30 PROCEDURE — 93005 ELECTROCARDIOGRAM TRACING: CPT

## 2021-07-30 PROCEDURE — 96365 THER/PROPH/DIAG IV INF INIT: CPT

## 2021-07-30 PROCEDURE — 85730 THROMBOPLASTIN TIME PARTIAL: CPT

## 2021-07-30 PROCEDURE — 80048 BASIC METABOLIC PNL TOTAL CA: CPT

## 2021-07-30 PROCEDURE — 83880 ASSAY OF NATRIURETIC PEPTIDE: CPT

## 2021-07-30 PROCEDURE — 96375 TX/PRO/DX INJ NEW DRUG ADDON: CPT

## 2021-07-30 PROCEDURE — 96372 THER/PROPH/DIAG INJ SC/IM: CPT

## 2021-07-30 PROCEDURE — 36415 COLL VENOUS BLD VENIPUNCTURE: CPT

## 2021-07-30 PROCEDURE — 87426 SARSCOV CORONAVIRUS AG IA: CPT

## 2021-07-30 PROCEDURE — 82550 ASSAY OF CK (CPK): CPT

## 2021-07-30 PROCEDURE — 84484 ASSAY OF TROPONIN QUANT: CPT

## 2021-07-30 PROCEDURE — 71045 X-RAY EXAM CHEST 1 VIEW: CPT

## 2021-07-30 PROCEDURE — 85610 PROTHROMBIN TIME: CPT

## 2021-07-30 PROCEDURE — 80053 COMPREHEN METABOLIC PANEL: CPT

## 2021-07-30 PROCEDURE — 82553 CREATINE MB FRACTION: CPT

## 2021-07-30 PROCEDURE — 85025 COMPLETE CBC W/AUTO DIFF WBC: CPT

## 2021-07-30 RX ADMIN — DILTIAZEM HYDROCHLORIDE SCH MG: 60 TABLET, FILM COATED ORAL at 15:20

## 2021-07-30 RX ADMIN — APIXABAN SCH MG: 5 TABLET, FILM COATED ORAL at 20:24

## 2021-07-30 RX ADMIN — DILTIAZEM HYDROCHLORIDE SCH MG: 60 TABLET, FILM COATED ORAL at 20:24

## 2021-07-30 RX ADMIN — SOTALOL HYDROCHLORIDE SCH MG: 80 TABLET ORAL at 20:24

## 2021-07-30 RX ADMIN — PANTOPRAZOLE SODIUM SCH MG: 40 TABLET, DELAYED RELEASE ORAL at 13:44

## 2021-07-30 NOTE — ER.PDOC
General


Chief Complaint:  Palpitations


Stated Complaint:  A-FIB


Time seen by MD:  09:45


Source:  patient


Exam Limitations:  no limitations





History of Present Illness


Initial Comments


Palpitations today, patient stopped taking his medications for A. fib since 3 

weeks ago.  He is not able to afford his medications.  No chest pain or 

shortness of breath.


Quality:  fast


Activities at Onset:  none


Associated Symptoms:  denies symptoms


Prior symptoms/Treatment:  Similar symptoms previous, Recenly Seen, Treated by 

Doctor, Recently Hospitalized


Allergies:  


Coded Allergies:  


     No Known Allergies (Unverified , 12/17/16)


Home Meds


Active Scripts


Diltiazem Hcl (CARDIZEM) 60 Mg Tablet, 60 MG PO TID, #90 TAB 6 Refills


   Prov:MARY ANNE AGUIRRE MD         5/16/20


Sotalol Hcl (SOTALOL) 80 Mg Tablet, 0.5 TAB PO BID, #30 TAB 3 Refills


   Prov:BROOKLYN MCGRATH DO         1/16/20


Apixaban (Eliquis) 5 Mg Tablet, 5 MG PO BID for 30 Days, #60 3 Refills


   Prov:JOSR TROTTER DO         12/27/19


Lisinopril (LISINOPRIL) 20 Mg Tablet, 20 MG PO DAILY for 30 Days, #30 TAB


   Prov:JOSR TROTTER DO         12/27/19


Reported Medications


Omeprazole Magnesium (PRILOSEC OTC) 20 Mg Tablet.dr, 1 TAB PO QD for 30 Days, 

#30 TAB 0 Refills


   5/15/20





Past Medical History


Medical History:  arrhythmia


Surgical History:  no surgical history





Family History


Significant Family History:  no pertinent family hx





Social History


Alcohol Use:  occassionally


Drug Use:  none





Constitutional:  no symptoms reported


Respiratory:  no symptoms reported


Cardiovascular:  see HPI


Gastrointestinal:  no symptoms reported


Genitourinary:  no symptoms reported


All Other Systems:  Reviewed and Negative





Physical Exam


General Appearance:  No Apparent Distress, WD/WN


Neck:  Non-Tender, Full Range of Motion, Supple, Normal Inspection


Respiratory:  chest non-tender, lungs clear, normal breath sounds, no 

respiratory distress, no accessory muscle use


Cardiovascular:  Normal Peripheral Pulses, Tachycardia, Irregularly Irregular


Gastrointestinal:  Normal Bowel Sounds, No Organomegaly, No Pulsatile Mass, Non 

Tender, Soft


Extremities:  Normal Range of Motion, Non-Tender, Normal Inspection, No Pedal 

Edema, No Calf Tenderness, Normal Capillary Refill


Neurologic/Psychiatric:  CNs II-XII NML as Tested, No Motor/Sensory Deficits, 

Alert, Normal Mood/Affect, Oriented x 3


Skin:  Normal Color, Warm/Dry


Lymphatic:  No Adenopathy





Results/Orders


Results/Orders





Orders - EDDI BROWN MD


Digoxin (Lanoxin) (7/30/21 09:38)


Diltiazem Hcl (Cardizem) (7/30/21 09:39)


0.9 % Sodium Chloride (Ns 1000ml) (7/30/21 09:39)


Diltiazem Hcl (Cardizem) (7/30/21 09:44)


Diltiazem Hcl (Cardizem) (7/30/21 09:44)


Enoxaparin Sodium (Lovenox) (7/30/21 09:44)


Digoxin (Lanoxin) (7/30/21 09:44)


Cbc With Auto Diff (7/30/21 09:44)


Comprehensive Metabolic Panel (7/30/21 09:44)


Creatine Kinase (7/30/21 09:44)


Creatine Kinase Mb (7/30/21 09:44)


Troponin I (7/30/21 09:44)


Probnp    B-Type Np (7/30/21 09:44)


PT (7/30/21 09:44)


Partial Thromboplastin Time. (7/30/21 09:44)


Xr Chest 1v (7/30/21 09:44)


Ekg-Routine (7/30/21 09:44)


0.9 % Sodium Chloride (Ns 1000ml) (7/30/21 09:44)


0.9 % Sodium Chloride (Ns 100ml) (7/30/21 09:55)


Enoxaparin Sodium (Lovenox) (7/30/21 09:55)


Diltiazem Hcl (Cardizem) (7/30/21 09:55)


Metoprolol Tartrate (Lopresser) (7/30/21 09:59)


Metoprolol Tartrate (Lopresser) (7/30/21 09:59)


Diltiazem Hcl (Cardizem) (7/30/21 10:36)


Ondansetron (Zofran Odt) (7/30/21 10:36)


Covid19 Antigen Clare Maria D (7/30/21 10:42)





Vital Signs








  Date Time  Temp Pulse Resp B/P (MAP) Pulse Ox O2 Delivery O2 Flow Rate FiO2


 


7/30/21 11:11 98.7 120 18 128/105 (113) 95 Room Air  


 


7/30/21 10:11  122  130/86    


 


7/30/21 10:11  122      


 


7/30/21 09:54  145      


 


7/30/21 09:25 98.7 166 20  99   


 


7/30/21 09:25 98.7 166 18     


 


7/30/21 09:25 98.7 166 18 137/86 (103) 99 Room Air  








Administered Medications








 Medications


  (Trade)  Dose


 Ordered  Sig/Foreign


 Route


 PRN Reason  Start Time


 Stop Time Status Last Admin


Dose Admin


 


 Digoxin


  (Lanoxin)  250 mcg  STAT  STAT


 IV


   7/30/21 09:44


 7/30/21 09:49 DC 7/30/21 09:54


250 MCG


 


 Diltiazem HCl


  (Cardizem)  20 mg  STAT  STAT


 IV


   7/30/21 09:44


 7/30/21 09:49 DC 7/30/21 09:54


20 MG


 


 Diltiazem HCl 125


 mg/Sodium Chloride  125 ml @ 0


 mls/hr  STAT


 IV


   7/30/21 09:44


 7/30/21 09:49 DC 7/30/21 10:11


5 MLS/HR


 


 Enoxaparin Sodium


  (Lovenox)  100 mg  STAT  STAT


 SQ


   7/30/21 09:44


 7/30/21 09:49 DC 7/30/21 10:10


100 MG


 


 Metoprolol


 Tartrate


  (Lopresser)  5 mg  STAT  STAT


 IVP


   7/30/21 09:59


 7/30/21 10:00 DC 7/30/21 10:11


5 MG


 


 Sodium Chloride  1,000 ml @ 


 100 mls/hr  Q10H STAT


 IV


   7/30/21 09:44


 7/30/21 19:43  7/30/21 09:54


100 MLS/HR








                                Laboratory Tests








Test


 7/30/21


09:44 7/30/21


10:53


 


White Blood Count


 5.8 10^3/uL


(4.5-11.0) 





 


Red Blood Count


 4.81 10^6/uL


(4.50-5.90) 





 


Hemoglobin


 16.5 g/dL


(13.9-16.3)  H 





 


Hematocrit


 49.5 %


(37.0-53.0) 





 


Mean Corpuscular Volume


 102.9 fL


()  H 





 


Mean Corpuscular Hemoglobin


 34.3 pg


(26-34)  H 





 


Mean Corpuscular Hemoglobin


Concent 33.3 g/dL


(33-36.5) 





 


Red Cell Distribution Width


 13.7 %


(11.5-14.5) 





 


Platelet Count


 183 10^3/uL


(150-400) 





 


Mean Platelet Volume


 8.9 fL


(7.8-11.0) 





 


Neutrophils (%) (Auto)


 58.2 %


(41.0-85.0) 





 


Lymphocytes (%) (Auto)


 29.5 %


(24.0-44.0) 





 


Monocytes (%) (Auto)


 10.2 %


(5.0-12.0) 





 


Neutrophils # (Auto)


 3.4 10^3/uL


(1.8-7.7) 





 


Lymphocytes # (Auto)


 1.71 10^3/uL1


(1.0-4.8) 





 


Monocytes # (Auto)


 0.6 10^3/uL


(0.3-0.8) 





 


Absolute Immature Granulocyte


(auto 0.01 10^3 u/L


(0-2) 





 


Absolute Eosinophils (auto)


 0.1 10^3/uL


(0.0-0.2) 





 


Immature Granulocytes %


 0.20 %


(0.00-0.50) 





 


Eosinophils %


 1.2 %


(0.0-5.0) 





 


Basophils %


 0.7 %


(0.0-0.2)  H 





 


Basophils #


 0.0 10^3/uL


(0.0-0.1) 





 


Prothrombin Time


 11.0 SEC


(9.6-12.0) 





 


Prothrombin Time INR


(Non-Therap) 1.0  


 





 


Activated Partial


Thromboplast Time 24.1 SEC


(24.67-30.72) 





 


Sodium Level


 143 mmol/L


(132-145) 





 


Potassium Level


 3.9 mmol/L


(3.6-5.2) 





 


Chloride Level


 106.0 mmol/L


() 





 


Carbon Dioxide Level


 24.4 mmol/L


(20.0-32) 





 


Anion Gap 16.5   


 


Blood Urea Nitrogen


 6 mg/dL (7-18)


L 





 


Creatinine


 0.86 mg/dL


(0.59-1.40) 





 


Estimated GFR (


American) 119.2 (>/=60)  


 





 


Est GFR (CKD-EPI)(Non-Afr


American) 98.5 (>/=60)  


 





 


BUN/Creatinine Ratio 6.0   


 


Glucose Level


 106 mg/dL


() 





 


Calcium Level


 8.7 mg/dL


(8.4-10.5) 





 


Total Bilirubin


 1.0 mg/dL


(0.2-1.0) 





 


Aspartate Amino Transferase


(AST) 29 U/L (0-35)  


 





 


Alanine Aminotransferase (ALT)


 21 U/L (12-78)


 





 


Alkaline Phosphatase


 111 U/L


() 





 


Total Creatine Kinase


 55 U/L


() 





 


Creatine Kinase MB


 0.7 ng/mL


(0.5-3.6) 





 


Troponin I


 < 0.02 ng/mL


(0.00-0.05) 





 


Pro-B-Type Natriuretic Peptide


 1047 pg/mL


(0-125)  H 





 


Total Protein


 7.4 g/dL


(6.4-8.2) 





 


Albumin


 3.5 g/dL


(3.4-5.0) 





 


Globulin 3.9   


 


Albumin/Globulin Ratio 0.897   


 


SARS-CoV-2 Antigen (Rapid)


 


 NEGATIVE


(NEGATIVE)











EKG/XRAY/CT/US


EKG:  no ST T wave changes


EKG Comments:  , A fib with RVR


XRAY:  chest (No active disease)





ER DEPART


Departure


Time of Disposition:  11:47


Disposition:  01 HOME / SELF CARE / HOMELESS


Impression:  


   Primary Impression:  


   Atrial fibrillation with rapid ventricular response


   Additional Impression:  


   Non compliance with medical treatment


Condition:  Stable


Referrals:  


PCP,UNKNOWN (PCP)


PRIMARY CARE PROVIDER


Comments


Admitted to Dr. Gutiérrez


Duration or Time Spent with Pa:  60 min





Critical Care Note


Total Time (mins):  60





Problem Qualifiers











EDDI BROWN MD                Jul 30, 2021 11:49

## 2021-07-30 NOTE — DIREP
PROCEDURE:CHEST 1 VIEW

 

COMPARISON:[facility], CR, XRAY CHEST SINGLE VW, 05/15/2020, 

07:11 AM.  [facility], CT, CTA CHEST, 12/24/2019, 01:54 PM.  [facility], CR, XRAY CHEST SINGLE VW, 12/24/2019, 12:44 PM.

 

INDICATIONS:Palpitations

 

FINDINGS:

LUNGS/PLEURA:No significant pulmonary parenchymal abnormalities. No effusions.

VASCULATURE:Normal.  Unremarkable pulmonary vasculature.

CARDIAC:Normal.  No cardiac silhouette abnormality or cardiomegaly. 

MEDIASTINUM:Normal.  No visible mass or adenopathy. 

BONES:Normal.  No fracture or visible bony lesion. 

OTHER:EKG leads overlie the chest.

 

CONCLUSION:No acute cardiopulmonary abnormalities.  There is no significant 

change as compared with the previous examination.

 

 

Dictated by: En Lora M.D. on 07/30/2021 at 10:19 AM     

Electronically Signed By: En Lora M.D. on 07/30/2021 at 10:21 AM

## 2021-07-30 NOTE — PCM.HP
History of Present Illness


Reason for Visit:  Palpitations


History of Present Illness


40-year-old male with past medical history of atrial fibrillation, cigarette 

smoker, noncompliance with medications presented to the emergency room with 

palpitations that started earlier today.  Patient was found to be in A. fib with

RVR.  Heart rate was in the 180s.  Patient was given IVDiltiazemin the emergency

room.  Patient then was started on IV diltiazem drip.  Patient admits that he 

stopped taking his medications 3 weeks ago.  He said that he got Covid 19 3 

weeks ago and since then he stopped taking his heart medications.  COVID-19 test

today is negative.  Patient is not hypoxic.  Chest x-ray unremarkable.  IV dil

tiazem drip stopped and patient was started on Oral diltiazem, and sotalol.  

Patient is being admitted hospital for further management


Past Medical History


Cardiac:  AFIB, CAD


Past Surgical History:  No pertinent hx





Past Social History


Smoke:  1 pack per day


Alcohol:  social





Travel Hx


EBOLA RISK:Travel to/contact w:  No





Review of Systems


Constitutional:  No: Fever, Chills


Eyes:  No: Pain


ENT:  No: Ear pain, Ear discharge


Respiratory:  No: Cough, Dry, Shortness of breath


Cardiovascular:  Palpitations; No: Chest Pain


Gastrointestinal:  No: Nausea


Genitourinary:  No Dysuria


Musculoskeletal:  No: other, neck pain


Skin:  No: Rash, Lesions


Neurological:  No: Weakness, Numbness


Allergies:  


Coded Allergies:  


     No Known Allergies (Unverified , 16)


Scheduled


Apixaban (Eliquis), 5 MG PO BID


Diltiazem Hcl (Cardizem), 60 MG PO TID


Lisinopril (Lisinopril), 20 MG PO DAILY


Omeprazole Magnesium (Prilosec Otc), 1 TAB PO QD, (Reported)


Sotalol Hcl (Sotalol), 0.5 TAB PO BID





Exam


Vital Signs





Vital Signs








  Date Time  Temp Pulse Resp B/P (MAP) Pulse Ox O2 Delivery O2 Flow Rate FiO2


 


21 12:58      Room Air  


 


21 12:53 98.2 85 18 137/94 (108) 96   








General Appearance:  Alert, Oriented X3


HEENT:  Atraumatic, PERRLA


Respiratory:  Clear to auscultation, Normal air movement


Cardiovascular:  Other


Abdominal:  Normal bowel sounds, Soft, No tenderness


Extremities:  No clubbing, No cyanosis


Skin:  No rash, No lesions


Neuro:  Normal gait, Normal speech


Psych/Mental Status:  Mental status NL, Mood NL





Assessment/Plan


40-year-old male with past medical history of atrial fibrillation, cigarette 

smoker, noncompliance with medications presented to the emergency room with 

palpitations that started earlier today.  Patient was found to be in A. fib with

RVR.  Heart rate was in the 180s.  Patient was given IVDiltiazemin the emergency

room.  Patient then was started on IV diltiazem drip.  Patient admits that he 

stopped taking his medications 3 weeks ago.  He said that he got Covid 19 3 

weeks ago and since then he stopped taking his heart medications.  COVID-19 test

today is negative.  Patient is not hypoxic.  Chest x-ray unremarkable.  IV 

diltiazem drip stopped and patient was started on Oral diltiazem, and sotalol.  

Patient is being admitted hospital for further management


Problems:  


(1) Atrial fibrillation with rapid ventricular response


ICD Code:  I48.91 - Unspecified atrial fibrillation


SNOMED:  463036457220642


(2) Non compliance with medical treatment


Status:  Acute


ICD Code:  Z91.19 - Patient's noncompliance with other medical treatment and 

regimen


SNOMED:  0800834


(3) History of COVID-19


ICD Code:  Z86.16 - Personal history of COVID-19


SNOMED:  869801359, 946693023003525576


(4) Cigarette smoker


ICD Code:  F17.210 - Nicotine dependence, cigarettes, uncomplicated


SNOMED:  31035060


Patient History:  


FH: esophageal cancer


  32 MOTHER, 


FH: suicide


  33 FATHER, , Age:30's - 40


Plan


Plan


Admit


Telemetry monitoring


Restart the patient on cardiac medications sotalol and Cardizem


Anticoagulation with Eliquis


We will monitor patient overnight,


Possible discharge in a.m. if remains stable.











HERSON JAMES MD       2021 13:47

## 2021-07-30 NOTE — PCM.EKG
[facility]

                                       

Test Date:    2021               Test Time:    09:55:19

Pat Name:     SAMUEL COLLINS            Department:   

Patient ID:   [id number]          Room:         330

Gender:       M                        Technician:   LAURA

:          1980               Requested By: EDDI BROWN

Order Number: [id number]           Reading MD:   Eddi BROWN

                                 Measurements

Intervals                              Axis          

Rate:         137                      P:            

NE:                                    QRS:          51

QRSD:         80                       T:            24

QT:           322                                    

QTc:          487                                    

                           Interpretive Statements

Atrial fibrillation

Baseline wander in lead(s) V4

Compared to ECG 05/15/2020 06:51:15

Early repolarization no longer present

Electronically Signed On 2021 19:40:29 CDT by Eddi BROWN



Please click the below link to view image of tracing.

## 2021-07-31 VITALS — DIASTOLIC BLOOD PRESSURE: 92 MMHG | SYSTOLIC BLOOD PRESSURE: 125 MMHG

## 2021-07-31 VITALS — SYSTOLIC BLOOD PRESSURE: 135 MMHG | DIASTOLIC BLOOD PRESSURE: 74 MMHG

## 2021-07-31 VITALS — DIASTOLIC BLOOD PRESSURE: 74 MMHG | SYSTOLIC BLOOD PRESSURE: 115 MMHG

## 2021-07-31 LAB
BASOPHILS # BLD AUTO: 0 10^3/UL (ref 0–0.1)
BASOPHILS NFR BLD AUTO: 0.4 % (ref 0–0.2)
CALCIUM SERPL-MCNC: 8.7 MG/DL (ref 8.4–10.5)
CO2 BLDA-SCNC: 29 MMOL/L (ref 20–32)
EOSINOPHIL # BLD AUTO: 0.1 10^3/UL (ref 0–0.2)
EOSINOPHIL NFR BLD AUTO: 1.9 % (ref 0–5)
ERYTHROCYTE [DISTWIDTH] IN BLOOD BY AUTOMATED COUNT: 13.5 % (ref 11.5–14.5)
GLUCOSE PRE 100 G GLC PO SERPL-MCNC: 109 MG/DL (ref 70–110)
LYMPHOCYTES # BLD AUTO: 1.51 10^3/UL1 (ref 1–4.8)
LYMPHOCYTES NFR BLD AUTO: 31.8 % (ref 24–44)
MCH RBC QN AUTO: 34.7 PG (ref 26–34)
MONOCYTES # BLD AUTO: 0.4 10^3/UL (ref 0.3–0.8)
MONOCYTES NFR BLD AUTO: 9.1 % (ref 5–12)
NEUTROPHILS # BLD AUTO: 2.7 10^3/UL (ref 1.8–7.7)
NEUTROPHILS NFR BLD AUTO: 56.8 % (ref 41–85)
PLATELET # BLD AUTO: 161 10^3/UL (ref 150–400)

## 2021-07-31 RX ADMIN — DILTIAZEM HYDROCHLORIDE SCH MG: 60 TABLET, FILM COATED ORAL at 09:31

## 2021-07-31 RX ADMIN — SOTALOL HYDROCHLORIDE SCH MG: 80 TABLET ORAL at 09:31

## 2021-07-31 RX ADMIN — APIXABAN SCH MG: 5 TABLET, FILM COATED ORAL at 09:31

## 2021-07-31 RX ADMIN — PANTOPRAZOLE SODIUM SCH MG: 40 TABLET, DELAYED RELEASE ORAL at 09:31

## 2021-07-31 NOTE — PRM.DC
Discharge Summary


Date of Discharge:  2021


Time of Request to Discharge:  09:00


Hospital Course


40-year-old male with past medical history of atrial fibrillation, cigarette 

smoker, noncompliance with medications presented to the emergency room with 

palpitations that started earlier today.  Patient was found to be in A. fib with

RVR.  Heart rate was in the 180s.  Patient was given IVDiltiazemin the emergency

room.  Patient then was started on IV diltiazem drip.  Patient admits that he 

stopped taking his medications 3 weeks ago.  He said that he got Covid 19 3 

weeks ago and since then he stopped taking his heart medications.  COVID-19 test

today is negative.  Patient is not hypoxic.  Chest x-ray unremarkable.  IV 

diltiazem drip stopped and patient was started on Oral diltiazem, and sotalol.  

Patient Was admitted hospital for further management





Patient was restarted on his home medications.  Heart rate remains under 

control.  This morning patient is feeling better, denies palpitations or chest 

pain.  Prescriptions given to the patient and patient was advised the importance

of compliance.  Patient need to establish care with a primary care physician, 

list will be provided to the patient of primary care physicians.  Also to follow

this cardiology in 2 to 3 weeks.  Patient discharged home.  Patient discharged 

in stable condition.  Activity as tolerated.  Diet cardiac.


Patient History:  


FH: esophageal cancer


  32 MOTHER, 


FH: suicide


  33 FATHER, , Age:30's - 40


Exam/Vitals


Blood pressure 115/75, heart rate 90, respiratory rate 18, temperature 98.


General:  Alert, Oriented X3


HEENT:  Atraumatic, PERRLA


Neck:  Supple, No JVD


Lungs:  Clear to auscultation, Normal air movement


Heart:  Other (Irregularly irregular)


Abdomen:  Normal bowel sounds, Soft, No tenderness


Extremities:  No clubbing, No cyanosis


Skin:  No breakdown, No significant lesion


Neuro:  Strength at 5/5 X4 ext, Normal tone


Psych/Mental Status:  Mental status NL, Mood NL


Scheduled


Apixaban (Eliquis), 5 MG PO BID


Diltiazem Hcl (Cardizem), 60 MG PO TID


Lisinopril (Lisinopril), 20 MG PO DAILY


Omeprazole Magnesium (Prilosec Otc), 1 TAB PO QD


Sotalol Hcl (Sotalol), 0.5 TAB PO BID


Sepsis Evaluation @ Discharge


             


21 


                                     


                                     


10:34





Plan


Problems:  


(1) Atrial fibrillation with rapid ventricular response


ICD Code:  I48.91 - Unspecified atrial fibrillation


SNOMED:  410356872719115


(2) Non compliance with medical treatment


Status:  Acute


ICD Code:  Z91.19 - Patient's noncompliance with other medical treatment and 

regimen


SNOMED:  0353666


(3) History of COVID-19


ICD Code:  Z86.16 - Personal history of COVID-19


SNOMED:  298934418, [id number]


Discharge Date:  2021


Discharge Disposition:  Stable


Plan


40-year-old male with past medical history of atrial fibrillation, cigarette 

smoker, noncompliance with medications presented to the emergency room with 

palpitations that started earlier today.  Patient was found to be in A. fib with

RVR.  Heart rate was in the 180s.  Patient was given IVDiltiazemin the emergency

room.  Patient then was started on IV diltiazem drip.  Patient admits that he 

stopped taking his medications 3 weeks ago.  He said that he got Covid 19 3 

weeks ago and since then he stopped taking his heart medications.  COVID-19 test

today is negative.  Patient is not hypoxic.  Chest x-ray unremarkable.  IV 

diltiazem drip stopped and patient was started on Oral diltiazem, and sotalol.  

Patient Was admitted hospital for further management





Patient was restarted on his home medications.  Heart rate remains under control

.  This morning patient is feeling better, denies palpitations or chest pain.  

Prescriptions given to the patient and patient was advised the importance of 

compliance.  Patient need to establish care with a primary care physician, list 

will be provided to the patient of primary care physicians.  Also to follow this

cardiology in 2 to 3 weeks.  Patient discharged home.  Patient discharged in 

stable condition.  Activity as tolerated.  Diet cardiac.











HERSON JAMES MD       2021 11:39

## 2021-10-07 ENCOUNTER — HOSPITAL ENCOUNTER (INPATIENT)
Dept: HOSPITAL 65 - ER | Age: 41
LOS: 5 days | Discharge: HOME | DRG: 299 | End: 2021-10-12
Attending: INTERNAL MEDICINE | Admitting: INTERNAL MEDICINE
Payer: COMMERCIAL

## 2021-10-07 VITALS — SYSTOLIC BLOOD PRESSURE: 136 MMHG | DIASTOLIC BLOOD PRESSURE: 90 MMHG

## 2021-10-07 VITALS — SYSTOLIC BLOOD PRESSURE: 148 MMHG | DIASTOLIC BLOOD PRESSURE: 72 MMHG

## 2021-10-07 VITALS — SYSTOLIC BLOOD PRESSURE: 153 MMHG | DIASTOLIC BLOOD PRESSURE: 75 MMHG

## 2021-10-07 VITALS — SYSTOLIC BLOOD PRESSURE: 142 MMHG | DIASTOLIC BLOOD PRESSURE: 94 MMHG

## 2021-10-07 VITALS — HEIGHT: 70 IN | WEIGHT: 218.19 LBS | BODY MASS INDEX: 31.24 KG/M2

## 2021-10-07 VITALS — SYSTOLIC BLOOD PRESSURE: 148 MMHG | DIASTOLIC BLOOD PRESSURE: 68 MMHG

## 2021-10-07 DIAGNOSIS — I48.91: ICD-10-CM

## 2021-10-07 DIAGNOSIS — F17.210: ICD-10-CM

## 2021-10-07 DIAGNOSIS — I25.10: ICD-10-CM

## 2021-10-07 DIAGNOSIS — Z20.822: ICD-10-CM

## 2021-10-07 DIAGNOSIS — I10: ICD-10-CM

## 2021-10-07 DIAGNOSIS — I26.99: ICD-10-CM

## 2021-10-07 DIAGNOSIS — Z80.0: ICD-10-CM

## 2021-10-07 DIAGNOSIS — I82.401: Primary | ICD-10-CM

## 2021-10-07 DIAGNOSIS — Z79.01: ICD-10-CM

## 2021-10-07 DIAGNOSIS — F12.90: ICD-10-CM

## 2021-10-07 LAB
ALP INTEST CFR SERPL: 183 U/L (ref 50–136)
ALT SERPL-CCNC: 31 U/L (ref 12–78)
AST SERPL-CCNC: 54 U/L (ref 0–35)
BASOPHILS # BLD AUTO: 0.1 10^3/UL (ref 0–0.1)
BASOPHILS NFR BLD AUTO: 2 % (ref 0–0.2)
CALCIUM SERPL-MCNC: 8.8 MG/DL (ref 8.4–10.5)
CO2 BLDA-SCNC: 29.9 MMOL/L (ref 20–32)
EOSINOPHIL # BLD AUTO: 0.2 10^3/UL (ref 0–0.2)
EOSINOPHIL NFR BLD AUTO: 2.9 % (ref 0–5)
ERYTHROCYTE [DISTWIDTH] IN BLOOD BY AUTOMATED COUNT: 14.6 % (ref 11.5–14.5)
GLUCOSE PRE 100 G GLC PO SERPL-MCNC: 87 MG/DL (ref 70–110)
LYMPHOCYTES # BLD AUTO: 2.07 10^3/UL1 (ref 1–4.8)
LYMPHOCYTES NFR BLD AUTO: 29 % (ref 24–44)
MCH RBC QN AUTO: 35.4 PG (ref 26–34)
MONOCYTES # BLD AUTO: 0.7 10^3/UL (ref 0.3–0.8)
MONOCYTES NFR BLD AUTO: 9.1 % (ref 5–12)
NEUTROPHILS # BLD AUTO: 4.1 10^3/UL (ref 1.8–7.7)
NEUTROPHILS NFR BLD AUTO: 57 % (ref 41–85)
PLATELET # BLD AUTO: 387 10^3/UL (ref 150–400)

## 2021-10-07 PROCEDURE — 36415 COLL VENOUS BLD VENIPUNCTURE: CPT

## 2021-10-07 PROCEDURE — 85730 THROMBOPLASTIN TIME PARTIAL: CPT

## 2021-10-07 PROCEDURE — 87426 SARSCOV CORONAVIRUS AG IA: CPT

## 2021-10-07 PROCEDURE — 83880 ASSAY OF NATRIURETIC PEPTIDE: CPT

## 2021-10-07 PROCEDURE — 84484 ASSAY OF TROPONIN QUANT: CPT

## 2021-10-07 PROCEDURE — 71275 CT ANGIOGRAPHY CHEST: CPT

## 2021-10-07 PROCEDURE — 99285 EMERGENCY DEPT VISIT HI MDM: CPT

## 2021-10-07 PROCEDURE — 85025 COMPLETE CBC W/AUTO DIFF WBC: CPT

## 2021-10-07 PROCEDURE — 80048 BASIC METABOLIC PNL TOTAL CA: CPT

## 2021-10-07 PROCEDURE — 93970 EXTREMITY STUDY: CPT

## 2021-10-07 PROCEDURE — 85379 FIBRIN DEGRADATION QUANT: CPT

## 2021-10-07 PROCEDURE — 93306 TTE W/DOPPLER COMPLETE: CPT

## 2021-10-07 PROCEDURE — 93005 ELECTROCARDIOGRAM TRACING: CPT

## 2021-10-07 PROCEDURE — 85610 PROTHROMBIN TIME: CPT

## 2021-10-07 PROCEDURE — 80053 COMPREHEN METABOLIC PANEL: CPT

## 2021-10-07 RX ADMIN — CEFTRIAXONE SODIUM SCH MLS/HR: 1 INJECTION, POWDER, FOR SOLUTION INTRAMUSCULAR; INTRAVENOUS at 18:52

## 2021-10-07 RX ADMIN — SOTALOL HYDROCHLORIDE SCH MG: 80 TABLET ORAL at 20:24

## 2021-10-07 RX ADMIN — DILTIAZEM HYDROCHLORIDE SCH MG: 60 TABLET, FILM COATED ORAL at 20:25

## 2021-10-07 NOTE — ER.PDOC
General


Chief Complaint:  Extremities


Stated Complaint:  SWOLLEN RT LEG


Time seen by MD:  08:32


Source:  patient


Exam Limitations:  no limitations





History of Present Illness


Initial Comments


40-year-old male presenting with right lower extremity swelling, hemoptysis, and

left-sided chest pain.  Patient explains that he was diagnosed with COVID-19 a 

few months ago.  Since then he had been okay and then he noticed that his left 

leg was swelling and that he had some pain to the left side.  Patient states 

that he also has a history of A. fib and is supposed be on Eliquis but has not 

taken his medication due to the price of the prescription drugs.  Patient also 

endorses shortness of breath on exertion.  Denies any history of PEs or DVTs in 

the past.  No anticoagulation use at this time.  States that lower extremity 

swelling and pain worsens with exertion or utilization.  No other complaints at 

this time patient denies any travel, recent surgery, no hormone use, no history 

of cancer


Modifying Factors:  improves with activity


Associated Symptoms:  chest pain, edema


Allergies:  


Coded Allergies:  


     No Known Allergies (Unverified , 12/17/16)


Home Meds


Active Scripts


Diltiazem Hcl (CARDIZEM) 60 Mg Tablet, 60 MG PO TID, #90 TAB 6 Refills


   Prov:HERSON JAMES MD         7/31/21


Omeprazole Magnesium (PRILOSEC OTC) 20 Mg Tablet.dr, 1 TAB PO QD for 30 Days, 

#30 TAB 0 Refills


   Prov:HERSON JAMES MD         7/31/21


Sotalol Hcl (SOTALOL) 80 Mg Tablet, 0.5 TAB PO BID, #30 TAB 3 Refills


   Prov:HERSON JAMES MD         7/31/21


Apixaban (Eliquis) 5 Mg Tablet, 5 MG PO BID for 30 Days, #60 TAB 3 Refills


   Prov:HERSON JAMES MD         7/31/21


Lisinopril (LISINOPRIL) 20 Mg Tablet, 20 MG PO DAILY for 30 Days, #30 TAB


   Prov:HERSON JAMES MD         7/31/21





Past Medical History


Medical History:  arrhythmia, hypertension


Surgical History:  cardiac cath





Social History


Alcohol Use:  rarely


Drug Use:  marijuana





Review of Systems


Respiratory:  see HPI


Cardiovascular:  see HPI


Musculoskeletal:  see HPI





Physical Exam


General Appearance:  No Apparent Distress, WD/WN


HEENT:  PERRL/EOMI, Normal ENT Inspection, TMs Normal, Pharynx Normal


Neck:  Non-Tender, Full Range of Motion, Supple, Normal Inspection


Respiratory:  chest non-tender, no respiratory distress, no accessory muscle 

use, wheezing


Cardiovascular:  Normal Peripheral Pulses, No JVD, Tachycardia, Irregularly 

Irregular


Gastrointestinal:  Normal Bowel Sounds, No Organomegaly, No Pulsatile Mass, Non 

Tender, Soft


Rectal:  Normal Exam


Extremities:  Normal Range of Motion, Non-Tender, Normal Inspection, Normal 

Capillary Refill, Calf Tenderness, Pedal Edema (Nonpitting edema to the right 

lower extremity, right lower extremity is double the size of the left.  Some 

dusky color toThe leg)


Neurologic/Psychiatric:  Alert, Normal Mood/Affect, Oriented x 3


Skin:  Warm/Dry, Cyanosis (to right leg )


Lymphatic:  No Adenopathy





Results/Orders


Results/Orders





Orders - EJESIEME,PAOLA C DO


Cbc With Auto Diff (10/7/21 08:28)


Comprehensive Metabolic Panel (10/7/21 08:28)


Troponin I (10/7/21 08:28)


Probnp    B-Type Np (10/7/21 08:28)


PT (10/7/21 08:28)


Partial Thromboplastin Time. (10/7/21 08:28)


D-Dimer (10/7/21 08:28)


Ekg-Routine (10/7/21 08:28)


Saline Lock (10/7/21 08:28)


Cta Chest (10/7/21 08:28)


Us Bilat Lower Ext Venous Dopp (10/7/21 08:28)


Ipratropium/Albuterol Sulfate (Duo 0.5-3 (10/7/21 08:34)


Ipratropium/Albuterol Sulfate (Duo 0.5-3 (10/7/21 08:52)


Covid19 Antigen Clare Maria D (10/7/21 10:02)


Heparin Sodium,Porcine/D5w (Heparin-D5w (10/7/21 10:53)


Heparin Sodium,Porcine/D5w (Heparin-D5w (10/7/21 11:00)


Heparin Sodium,Porcine (Heparin Sodium) (10/7/21 10:58)


Diltiazem Hcl (Cardizem) (10/7/21 11:00)


Heparin Sodium,Porcine/D5w (Heparin-D5w (10/7/21 11:09)


Heparin Sodium,Porcine (Heparin) (10/7/21 11:09)


Routine Vital Signs (10/7/21 11:09)


Bedrest (10/7/21 11:09)


Regular Diet (10/7/21 Lunch)


Dietary Screen (10/7/21 Dinner)


Intake & Output (10/7/21 11:09)


Troponin I (10/7/21 11:09)


PT (10/8/21 05:00)


Partial Thromboplastin Time. (10/8/21 05:00)


Admit Orders (10/7/21 11:09)


Bed Alarm (10/7/21 11:09)


Request For Echo (10/7/21 11:09)





Vital Signs








  Date Time  Temp Pulse Resp B/P (MAP) Pulse Ox O2 Delivery O2 Flow Rate FiO2


 


10/7/21 10:48  77  148/72 (97) 99   


 


10/7/21 08:15 98.7 70 18  95   


 


10/7/21 08:15 98.7 70 18 153/75 (101) 95 Room Air  


 


10/7/21 08:15 98.7 70 18     








Administered Medications








 Medications


  (Trade)  Dose


 Ordered  Sig/Foreign


 Route


 PRN Reason  Start Time


 Stop Time Status Last Admin


Dose Admin


 


 Albuterol/


 Ipratropium


  (Duo 0.5-3(2.5)


 Mg/3 ml)  3 ml  STAT  STAT


 IH


   10/7/21 08:34


 10/7/21 08:35 DC 10/7/21 08:52


3 ML








                                Laboratory Tests








Test


 10/7/21


08:27 10/7/21


10:09


 


White Blood Count


 7.1 10^3/uL


(4.5-11.0) 





 


Red Blood Count


 5.40 10^6/uL


(4.50-5.90) 





 


Hemoglobin


 19.1 g/dL


(13.9-16.3)  H 





 


Hematocrit


 58.1 %


(37.0-53.0)  H 





 


Mean Corpuscular Volume


 107.6 fL


()  H 





 


Mean Corpuscular Hemoglobin


 35.4 pg


(26-34)  H 





 


Mean Corpuscular Hemoglobin


Concent 32.9 g/dL


(33-36.5)  L 





 


Red Cell Distribution Width


 14.6 %


(11.5-14.5)  H 





 


Platelet Count


 387 10^3/uL


(150-400) 





 


Mean Platelet Volume


 8.1 fL


(7.8-11.0) 





 


Neutrophils (%) (Auto)


 57.0 %


(41.0-85.0) 





 


Lymphocytes (%) (Auto)


 29.0 %


(24.0-44.0) 





 


Monocytes (%) (Auto)


 9.1 %


(5.0-12.0) 





 


Neutrophils # (Auto)


 4.1 10^3/uL


(1.8-7.7) 





 


Lymphocytes # (Auto)


 2.07 10^3/uL1


(1.0-4.8) 





 


Monocytes # (Auto)


 0.7 10^3/uL


(0.3-0.8) 





 


Absolute Immature Granulocyte


(auto 0.03 10^3 u/L


(0-2) 





 


Absolute Eosinophils (auto)


 0.2 10^3/uL


(0.0-0.2) 





 


Immature Granulocytes %


 0.40 %


(0.00-0.50) 





 


Eosinophils %


 2.9 %


(0.0-5.0) 





 


Basophils %


 2.0 %


(0.0-0.2)  H 





 


Basophils #


 0.1 10^3/uL


(0.0-0.1) 





 


Prothrombin Time


 10.6 SEC


(9.6-12.0) 





 


Prothrombin Time INR


(Non-Therap) 1.0  


 





 


Activated Partial


Thromboplast Time 23.9 SEC


(24.67-30.72) 





 


D-Dimer


 6.09 mg/L


(0.19-0.49)  *H 





 


Sodium Level


 143 mmol/L


(132-145) 





 


Potassium Level


 3.3 mmol/L


(3.6-5.2)  L 





 


Chloride Level


 105.0 mmol/L


() 





 


Carbon Dioxide Level


 29.9 mmol/L


(20.0-32) 





 


Anion Gap 11.4   


 


Blood Urea Nitrogen


 5 mg/dL (7-18)


L 





 


Creatinine


 0.78 mg/dL


(0.59-1.40) 





 


Estimated GFR (


American) 133.4 (>/=60)  


 





 


Est GFR (CKD-EPI)(Non-Afr


American) 110.2 (>/=60)  


 





 


BUN/Creatinine Ratio 6.0   


 


Glucose Level


 87 mg/dL


() 





 


Calcium Level


 8.8 mg/dL


(8.4-10.5) 





 


Total Bilirubin


 0.3 mg/dL


(0.2-1.0) 





 


Aspartate Amino Transferase


(AST) 54 U/L (0-35)


H 





 


Alanine Aminotransferase (ALT)


 31 U/L (12-78)


 





 


Alkaline Phosphatase


 183 U/L


()  H 





 


Troponin I


 < 0.02 ng/mL


(0.00-0.05) 





 


Pro-B-Type Natriuretic Peptide


 296 pg/mL


(0-125)  H 





 


Total Protein


 7.9 g/dL


(6.4-8.2) 





 


Albumin


 3.0 g/dL


(3.4-5.0)  L 





 


Globulin 4.9   


 


Albumin/Globulin Ratio 0.612   


 


SARS-CoV-2 Antigen (Rapid)


 


 NEGATIVE


(NEGATIVE)











Progress


Progress


PT has a large DVT and PE. Will place on heparin , diltiazem for a fib and 

monitor 





Time is 11:16 AM.  Discussed with Dr. Lalito Trimble who will admit the patient 

at this time to inpatient telemetry.  Informed him of patient's heparin drip and

bolus of diltiazem.  Informed patient of his admission.  Patient resting 

complaint bed, no chest pain, no respiratory distress.  Normal BP at this time





EKG/XRAY/CT/US


EKG:  rhythm (Atrial fibrillation with rapid ventricular response, , no 

STEMI, normal axis)





ER DEPART


Departure


Time of Disposition:  11:17


Disposition:  09 ADMITTED AS INPATIENT


Impression:  


   Primary Impression:  


   Pulmonary embolism


   Additional Impressions:  


   Right leg DVT


   Atrial fibrillation with RVR


Condition:  Improved


Referrals:  


PCP,UNKNOWN (PCP)


PRIMARY CARE PROVIDER


Duration or Time Spent with Pa:  45 min





Problem Qualifiers











PAOLA BROWN DO            Oct 7, 2021 09:44

## 2021-10-07 NOTE — DIREP
PROCEDURE:CTA - CHEST (NON CORONARY)

 

COMPARISON:[facility], CT, CTA CHEST, 12/24/2019, 01:54 PM.  

[facility], US, US VENOUS IMAGING BILAT, 10/07/2021, 09:10 AM.

 

INDICATIONS:concern for PE, DVT, COUGHING UP BLOOD

 

TECHNIQUE:After obtaining the patient's consent, CTA images were obtained 

without and with non-ionic intravenous contrast material.  Multi-planar MIP/3-D 

images were created to optimize visualization of vascular anatomy.  

 

FINDINGS:

VASCULATURE:Filling defects compatible with pulmonary emboli in the bilateral 

lower lobes and left upper lobe pulmonary arteries.

THORACIC AORTA:Normal.

LUNGS:Rounded infiltrates in the right upper lobe, left upper lobe/lingula, 

and left lower lobe.

MEDIASTINUM/TIESHA:Normal.

CARDIAC:Normal.

PLEURA:Small left pleural effusion.

CHEST WALL:Normal.

LIMITED ABDOMEN:Normal.

BONES:Normal.

OTHER:Normal.

 

CONCLUSION:Pulmonary emboli in the bilateral lower lobes and left upper lobe 

pulmonary arteries.

 

Rounded infiltrates in the right upper lobe, left upper lobe/lingula, and left 

lower lobe, may be due to multifocal pneumonia or less likely, pulmonary 

infarction.  Small left pleural effusion.

 

 

Dictated by: Tunde Plasencia M.D. on 10/07/2021 at 10:45 AM     

Electronically Signed By: Tunde Plasencia M.D. on 10/07/2021 at 10:56 AM

## 2021-10-07 NOTE — DIREP
PROCEDURE:US VENOUS IMAGING BILAT

 

COMPARISON:None.

 

INDICATIONS:concern for DVT, Right leg swelling

 

TECHNIQUE:The lower extremities were evaluated utilizing gray scale images 

with segmental compression, color Doppler, and spectral Doppler with 

respiratory variation and augmentation.

 

FINDINGS:

RIGHT

External iliac vein: Not shown.

Common femoral vein:Partially occlusive thrombus

Profunda femoris: Partially occlusive thrombus

Superficial femoral vein:Partially occlusive thrombus

Popliteal vein:Partially occlusive thrombus

Posterior tibial vein:Partially occlusive thrombus

Peroneal vein: Patent

Greater saphenous vein:Partially occlusive thrombus

Subcutaneous edema is noted in the right lower extremity.

Waveforms: Within normal limits.

 

LEFT

External iliac vein: Not shown.

Common femoral vein:Patent

Profunda femoris: Patent

Superficial femoral vein:Patent

Popliteal vein:Patent

Posterior tibial vein:Patent

Peroneal vein: Patent

Anterior tibial vein:Patent

Greater saphenous vein:Patent

 

Waveforms:  Within normal limits.

 

CONCLUSION:

1.  Findings are positive for DVT in the veins of the right lower extremity, as 

described above.

 

 

Dictated by: CARA Physician on 10/07/2021 at 10:01 AM     

Electronically Signed By: Denzel Cho MD on 10/07/2021 at 10:30 AM   

 

 

henri

## 2021-10-07 NOTE — NUR
REPORT

REPORT GIVEN TO JACKY GRIJALVA. COMMUNICATED THAT HEPARIN DRIP IS CURRENTLY GOING AT 1,100 
AND THE NEXT APTT IS DUE AT 2130. RELINQUISHED CARE FOR PATIENT AT THIS TIME.

## 2021-10-07 NOTE — PCM.HP
History of Present Illness


Reason for Visit:  Shortness of breath


History of Present Illness


40-year-old male presenting with right lower extremity swelling, hemoptysis, and

left-sided chest pain.  Patient explains that he was diagnosed with COVID-19 a 

few months ago.  Since then he had been okay and then he noticed that his left 

leg was swelling and that he had some pain to the left side.  Patient states 

that he also has a history of A. fib and is supposed be on Eliquis but has not 

taken his medication due to the price of the prescription drugs.  Patient also 

endorses shortness of breath on exertion.  Denies any history of PEs or DVTs in 

the past.  No anticoagulation use at this time.  States that lower extremity 

swelling and pain worsens with exertion or utilization.  No other complaints at 

this time patient denies any travel, recent surgery, no hormone use, no history 

of cancer





Work-up in the emergency room patient was found to have significant swelling of 

the right lower extremity, initial work including venous ultrasound patient was 

found to have deep venous thrombosis of the right lower extremity, CTA of the 

chest showed bilateral pulmonary embolism. Patient was found to be in A. fib 

with RVR, given IV diltiazem in the ED.  Heart rate was controlled. Patient 

started on IV heparin drip.  Patient admitted to the hospital for further 

management.  Patient has history of A. fib, supposed to be on Eliquis but he 

cannot afford it.


Past Medical History


Cardiac:  AFIB, CAD, HTN


Past Surgical History:  No pertinent hx





Past Social History


Smoke:  1 pack per day


Alcohol:  social





Travel Hx


EBOLA RISK:Travel to/contact w:  No





Review of Systems


Respiratory:  Shortness of breath, SOB with excertion


Cardiovascular:  Chest Pain, Palpitations, Other


Musculoskeletal:  other (Right leg swelling and pain)


Allergies:  


Coded Allergies:  


     No Known Allergies (Unverified , 16)


Scheduled


Apixaban (Eliquis), 5 MG PO BID


Diltiazem Hcl (Cardizem), 60 MG PO TID


Lisinopril (Lisinopril), 20 MG PO DAILY


Omeprazole Magnesium (Prilosec Otc), 1 TAB PO QD


Sotalol Hcl (Sotalol), 0.5 TAB PO BID





VTE


VTE Risk Total Score:  1


VTE Risk Score


VTE Risk:


Score 0-1 = Low Risk


(Aggressive mobilization; early ambulation; no VTE prophylaxis required)


Score 2: Moderate Risk


(Intermittent/Pneumatic Compression Device OR Lovenox/Heparin/Coumadin)


Score 3-4: High Risk


(Intermittent/Pneumatic Compression Device AND Lovenox/Heparin/Coumadin)


Score  > or =5: Highest Risk


(Intermittent/Pneumatic Compression Device AND Lovenox/Heparin/Coumadin)





VTE


VTE Present on Admission:  No


Currently receiving anticoagul:  Yes


VTE Risk Total Score:  1





Exam


Vital Signs





Vital Signs








  Date Time  Temp Pulse Resp B/P (MAP) Pulse Ox O2 Delivery O2 Flow Rate FiO2


 


10/7/21 14:49  75  148/68 (94) 98   


 


10/7/21 08:15 98.7  18     


 


10/7/21 08:15      Room Air  








General Appearance:  Alert, Oriented X3, moderate distress


HEENT:  Atraumatic, PERRLA


Respiratory:  Other (Fair bilateral air entry)


Cardiovascular:  Other (Irregular irregular)


Abdominal:  Normal bowel sounds, Soft


Extremities:  No clubbing, Other (Right leg swollen, tender and warm)


Skin:  Other (Erythema of the right leg)


Neuro:  Normal speech, Strength at 5/5 X4 ext, Sensation intact


Psych/Mental Status:  Mental status NL, Mood NL





Assessment/Plan


40-year-old male with history of atrial fibrillation, CAD, presented emergency 

room with right leg swelling and chest pain, hemoptysis.  Work-up patient was fo

und to have PE, DVT, A. fib with RVR.





Plan


Admit


Continue IV heparin drip


Continue home medications including diltiazem, sotalol


Telemetry monitoring


Reconcile home meds


GI prophylaxis


DVT prophylaxis patient on heparin


We will consult case management, patient cannot afford Eliquis, I discussed with

the patient that the patient probably will need warfarin but he will need to go 

to the lab to get his INR checked, patient will need long-term anticoagulation 

because of his A. fib and the new diagnosis of DVT and PE.


Problems:  


(1) Pulmonary embolism


ICD Code:  I26.99 - Other pulmonary embolism without acute cor pulmonale


SNOMED:  20688512


(2) Right leg DVT


Status:  Acute


ICD Code:  I82.401 - Acute embolism and thrombosis of unspecified deep veins of 

right lower extremity


SNOMED:  511370982


(3) Atrial fibrillation with RVR


ICD Code:  I48.91 - Unspecified atrial fibrillation


SNOMED:  [id number]


Patient History:  


FH: esophageal cancer


  32 MOTHER, 


FH: suicide


  33 FATHER, , Age:30's - 40











HERSON JAMES MD        Oct 7, 2021 16:40

## 2021-10-07 NOTE — PCM.EKG
[facility]

                                       

Test Date:    2021-10-07               Test Time:    10:12:30

Pat Name:     SAMUEL COLLINS            Department:   

Patient ID:   [id number]          Room:         329

Gender:       M                        Technician:   s

:          1980               Requested By: AMANDA HAYS

Order Number: [id number]           Reading MD:   Amanda Hays

                                 Measurements

Intervals                              Axis          

Rate:         125                      P:            

OH:                                    QRS:          28

QRSD:         96                       T:            10

QT:           352                                    

QTc:          508                                    

                           Interpretive Statements

Atrial fibrillation

Minimal ST depression, inferior leads

Borderline prolonged QT interval

Compared to ECG 2021 09:55:19

ST (T wave) deviation now present

approx rate of 125

no STEMI

Electronically Signed On 10-7-2021 16:27:27 CDT by Amanda Hays



Please click the below link to view image of tracing.

## 2021-10-07 NOTE — NUR
1ST CALL

ATTEMPTED TO CALL PATIENT, NOT IN WAITING AREA, ADMITTED STATES PATIENT STEPPED 
OUTSIDE WILL NOTIFY ED WHEN PATIENT RETURNS

## 2021-10-07 NOTE — NUR
ARRIVAL

PATIENT ARRIVED ON MED-SURG UNIT AT THIS TIME TO ROOM #329. RECEIVED REPORT, ASSUMED CARE 
FOR PATIENT AT THIS TIME.

## 2021-10-08 VITALS — DIASTOLIC BLOOD PRESSURE: 79 MMHG | SYSTOLIC BLOOD PRESSURE: 139 MMHG

## 2021-10-08 VITALS — SYSTOLIC BLOOD PRESSURE: 133 MMHG | DIASTOLIC BLOOD PRESSURE: 98 MMHG

## 2021-10-08 VITALS — DIASTOLIC BLOOD PRESSURE: 99 MMHG | SYSTOLIC BLOOD PRESSURE: 150 MMHG

## 2021-10-08 VITALS — DIASTOLIC BLOOD PRESSURE: 94 MMHG | SYSTOLIC BLOOD PRESSURE: 130 MMHG

## 2021-10-08 VITALS — DIASTOLIC BLOOD PRESSURE: 97 MMHG | SYSTOLIC BLOOD PRESSURE: 143 MMHG

## 2021-10-08 VITALS — SYSTOLIC BLOOD PRESSURE: 135 MMHG | DIASTOLIC BLOOD PRESSURE: 79 MMHG

## 2021-10-08 LAB
ALP INTEST CFR SERPL: 152 U/L (ref 50–136)
ALT SERPL-CCNC: 19 U/L (ref 12–78)
AST SERPL-CCNC: 30 U/L (ref 0–35)
BASOPHILS # BLD AUTO: 0.1 10^3/UL (ref 0–0.1)
BASOPHILS NFR BLD AUTO: 1.1 % (ref 0–0.2)
CALCIUM SERPL-MCNC: 8.2 MG/DL (ref 8.4–10.5)
CO2 BLDA-SCNC: 30.2 MMOL/L (ref 20–32)
EOSINOPHIL # BLD AUTO: 0.1 10^3/UL (ref 0–0.2)
EOSINOPHIL NFR BLD AUTO: 1.7 % (ref 0–5)
ERYTHROCYTE [DISTWIDTH] IN BLOOD BY AUTOMATED COUNT: 13.9 % (ref 11.5–14.5)
GLUCOSE PRE 100 G GLC PO SERPL-MCNC: 109 MG/DL (ref 70–110)
LYMPHOCYTES # BLD AUTO: 1.29 10^3/UL1 (ref 1–4.8)
LYMPHOCYTES NFR BLD AUTO: 20.2 % (ref 24–44)
MCH RBC QN AUTO: 35 PG (ref 26–34)
MONOCYTES # BLD AUTO: 0.6 10^3/UL (ref 0.3–0.8)
MONOCYTES NFR BLD AUTO: 8.6 % (ref 5–12)
NEUTROPHILS # BLD AUTO: 4.3 10^3/UL (ref 1.8–7.7)
NEUTROPHILS NFR BLD AUTO: 68.1 % (ref 41–85)
PLATELET # BLD AUTO: 308 10^3/UL (ref 150–400)

## 2021-10-08 RX ADMIN — DILTIAZEM HYDROCHLORIDE SCH MG: 60 TABLET, FILM COATED ORAL at 09:41

## 2021-10-08 RX ADMIN — CEFTRIAXONE SODIUM SCH MLS/HR: 1 INJECTION, POWDER, FOR SOLUTION INTRAMUSCULAR; INTRAVENOUS at 17:20

## 2021-10-08 RX ADMIN — ENOXAPARIN SODIUM SCH MG: 100 INJECTION SUBCUTANEOUS at 13:41

## 2021-10-08 RX ADMIN — SOTALOL HYDROCHLORIDE SCH MG: 80 TABLET ORAL at 09:40

## 2021-10-08 RX ADMIN — DILTIAZEM HYDROCHLORIDE SCH MG: 60 TABLET, FILM COATED ORAL at 20:37

## 2021-10-08 RX ADMIN — SOTALOL HYDROCHLORIDE SCH MG: 80 TABLET ORAL at 20:37

## 2021-10-08 RX ADMIN — PANTOPRAZOLE SODIUM SCH MG: 40 TABLET, DELAYED RELEASE ORAL at 09:40

## 2021-10-08 RX ADMIN — LISINOPRIL SCH MG: 20 TABLET ORAL at 09:41

## 2021-10-08 RX ADMIN — DILTIAZEM HYDROCHLORIDE SCH MG: 60 TABLET, FILM COATED ORAL at 15:35

## 2021-10-08 RX ADMIN — ENOXAPARIN SODIUM SCH MG: 100 INJECTION SUBCUTANEOUS at 20:36

## 2021-10-08 RX ADMIN — WARFARIN SODIUM SCH MG: 5 TABLET ORAL at 15:33

## 2021-10-08 NOTE — NUR
HEPARIN DRIP



aPTT IS 29.2

INCREASED HEPARIN  UNITS ACCORDING TO PROTOCOL

NOW RUNNING AT 1300 UNITS @ 32.5mL/hr

WITNESSED BY BRENNAN BAKER



NEXT aPTT LAB DRAW ORDERED FOR 0720 10/8/21

## 2021-10-08 NOTE — PRM.PN
Subjective


Subjective


Date:  Oct 8, 2021


Time:  09:00


Subjective


Chest pain improved, hemoptysis improved.  Still having right leg pain but 

better.  Currently patient is on IV heparin drip.


Patient History:  


FH: esophageal cancer


  32 MOTHER, 


FH: suicide


  33 FATHER, , Age:30's - 40





VTE


VTE Risk Total Score:  1


VTE Risk Score


VTE Risk:


Score 0-1 = Low Risk


(Aggressive mobilization; early ambulation; no VTE prophylaxis required)


Score 2: Moderate Risk


(Intermittent/Pneumatic Compression Device OR Lovenox/Heparin/Coumadin)


Score 3-4: High Risk


(Intermittent/Pneumatic Compression Device AND Lovenox/Heparin/Coumadin)


Score  > or =5: Highest Risk


(Intermittent/Pneumatic Compression Device AND Lovenox/Heparin/Coumadin)


Antico:Hep/LMWH/Coum/Xarelto:  Yes





Review of Systems


Respiratory:  Shortness of breath, SOB with excertion


Cardiovascular:  Chest Pain, Palpitations, Other


Musculoskeletal:  other (Right leg swelling and pain)


Allergies:  


Coded Allergies:  


     No Known Allergies (Unverified , 16)


Scheduled


Apixaban (Eliquis), 5 MG PO BID


Diltiazem Hcl (Cardizem), 60 MG PO TID


Lisinopril (Lisinopril), 20 MG PO DAILY


Omeprazole Magnesium (Prilosec Otc), 1 TAB PO QD


Sotalol Hcl (Sotalol), 0.5 TAB PO BID





Objective


Vitals and I/O





Vital Sign - Last 24 Hours








 10/7/21 10/7/21 10/7/21 10/7/21





 16:49 18:21 19:34 20:18


 


Temp 98.3   98.4


 


Pulse 120   72


 


Resp 20   18


 


B/P (MAP)    142/94 (110)


 


Pulse Ox 97   94


 


O2 Delivery  Room Air Room Air Nasal Canula


 


    





 10/7/21 10/7/21 10/8/21 10/8/21





 20:25 21:00 00:10 04:31


 


Temp   98.0 98.1


 


Pulse 74  101 76


 


Resp   18 18


 


B/P (MAP) 144/90  150/99 (116) 133/98 (110)


 


Pulse Ox   97 95


 


O2 Delivery  Room Air  Room Air


 


    





 10/8/21 10/8/21 10/8/21 10/8/21





 07:34 08:00 09:41 09:41


 


Temp 98.4   


 


Pulse 73  73 


 


Resp 18   


 


B/P (MAP) 143/97 (112)  143/97 143/97


 


Pulse Ox 98   


 


O2 Delivery  Room Air  


 


    





 10/8/21   





 12:25   


 


Temp 98.0   


 


Pulse 98   


 


Resp 16   


 


B/P (MAP) 139/79 (99)   


 


Pulse Ox 99   














Intake and Output 


 


 10/8/21





 07:00


 


Intake Total 370 ml


 


Output Total 400 ml


 


Balance -30 ml








General:  Alert, Oriented X3, moderate distress


HEENT:  Atraumatic, PERRLA


Lungs:  Other (Fair bilateral air entry)


Heart:  Other (Irregular irregular)


Abdomen:  Normal bowel sounds, Soft


Extremities:  No clubbing, Other (Right leg swollen, tender and warm)


Neuro:  Normal speech, Strength at 5/5 X4 ext, Sensation intact


Psych/Mental Status:  Mental status NL, Mood NL


All Results(Lab/Rad)





Laboratory Tests








Test


 10/7/21


17:05 10/7/21


21:37 10/8/21


02:29 10/8/21


07:18


 


Activated Partial


Thromboplast Time 25.9 SEC 


 27.0 SEC 


 29.2 SEC 


 26.7 SEC 





 


White Blood Count    6.4 10^3/uL 


 


Red Blood Count    5.05 10^6/uL 


 


Hemoglobin    17.7 g/dL 


 


Hematocrit    53.2 % 


 


Mean Corpuscular Volume    105.3 fL 


 


Mean Corpuscular Hemoglobin    35.0 pg 


 


Mean Corpuscular Hemoglobin


Concent 


 


 


 33.3 g/dL 





 


Red Cell Distribution Width    13.9 % 


 


Platelet Count    308 10^3/uL 


 


Mean Platelet Volume    8.4 fL 


 


Neutrophils (%) (Auto)    68.1 % 


 


Lymphocytes (%) (Auto)    20.2 % 


 


Monocytes (%) (Auto)    8.6 % 


 


Neutrophils # (Auto)    4.3 10^3/uL 


 


Lymphocytes # (Auto)    1.29 10^3/uL1 


 


Monocytes # (Auto)    0.6 10^3/uL 


 


Absolute Immature Granulocyte


(auto 


 


 


 0.02 10^3 u/L 





 


Absolute Eosinophils (auto)    0.1 10^3/uL 


 


Immature Granulocytes %    0.30 % 


 


Eosinophils %    1.7 % 


 


Basophils %    1.1 % 


 


Basophils #    0.1 10^3/uL 


 


Sodium Level    139 mmol/L 


 


Potassium Level    4.0 mmol/L 


 


Chloride Level    103.0 mmol/L 


 


Carbon Dioxide Level    30.2 mmol/L 


 


Anion Gap    9.8 


 


Blood Urea Nitrogen    4 mg/dL 


 


Creatinine    0.88 mg/dL 


 


Estimated GFR (


American) 


 


 


 116.1 





 


Est GFR (CKD-EPI)(Non-Afr


American) 


 


 


 95.9 





 


BUN/Creatinine Ratio    4.0 


 


Glucose Level    109 mg/dL 


 


Calcium Level    8.2 mg/dL 


 


Total Bilirubin    0.8 mg/dL 


 


Aspartate Amino Transf


(AST/SGOT) 


 


 


 30 U/L 





 


Alanine Aminotransferase


(ALT/SGPT) 


 


 


 19 U/L 





 


Alkaline Phosphatase    152 U/L 


 


Total Protein    6.5 g/dL 


 


Albumin    2.4 g/dL 


 


Globulin    4.1 


 


Albumin/Globulin Ratio    0.585 


 


Test


 10/8/21


12:17 


 


 





 


Activated Partial


Thromboplast Time 25.2 SEC 


 


 


 











Current Medications








 Medications


  (Trade)  Dose


 Ordered  Sig/Foreign


 Route


 PRN Reason  Start Time


 Stop Time Status Last Admin


Dose Admin


 


 Albuterol/


 Ipratropium


  (Duo 0.5-3(2.5)


 Mg/3 ml)  3 ml  STAT  STAT


 IH


   10/7/21 08:34


 10/7/21 08:35 DC 10/7/21 08:52





 


 Albuterol/


 Ipratropium


  (Duo 0.5-3(2.5)


 Mg/3 ml)  3 ml  STK-MED ONCE


 IH


   10/7/21 08:52


 10/7/21 08:52 DC  





 


 Heparin Sodium/


 Dextrose  500 ml @ 0


 mls/hr  TITRATE  STAT


 IV


   10/7/21 10:53


 10/7/21 10:58 DC 10/7/21 11:23





 


 Heparin Sodium/


 Dextrose  500 ml @ 0


 mls/hr  TITRATE


 IV


   10/7/21 11:00


 10/8/21 09:24 DC  





 


 Heparin Sodium


  (Porcine)


  (Heparin Sodium)  1,764 unit  OT  STAT


 IV


   10/7/21 10:58


 10/7/21 11:00 DC 10/7/21 11:24





 


 Diltiazem HCl


  (Cardizem)  25 mg  STAT  STAT


 IV


   10/7/21 11:00


 10/7/21 11:02 DC 10/7/21 11:24





 


 Heparin Sodium/


 Dextrose  500 ml @ ud  STK-MED ONCE


 IV


   10/7/21 11:09


 10/7/21 11:09 DC  





 


 Heparin Sodium


  (Porcine)


  (Heparin)  5,000 unit  STK-MED ONCE


 .ROUTE


   10/7/21 11:09


 10/7/21 11:09 DC  





 


 Diltiazem HCl


  (Cardizem)  50 mg  STK-MED ONCE


 IV


   10/7/21 11:15


 10/7/21 11:16 DC  





 


 Acetaminophen


  (Tylenol)  1,000 mg  Q6H  PRN


 PO


 PAIN 1 - 3  10/7/21 16:30


 21 16:29   





 


 Zolpidem Tartrate


  (Ambien)  5 mg  HS  PRN


 PO


 INSOMNIA  10/7/21 16:30


 21 16:29   





 


 Acetaminophen/


 Hydrocodone Bitart


  (Norco 5mg)  1 ea  Q4H  PRN


 PO


 PAIN 4 - 6  10/7/21 16:30


 21 16:29   





 


 Diltiazem HCl


  (Cardizem)  30 mg  Q6HR


 PO


   10/7/21 18:00


 10/7/21 16:36 DC  





 


 Diltiazem HCl


  (Cardizem)  60 mg  TID


 PO


   10/7/21 21:00


 21 20:59  10/8/21 09:41





 


 Lisinopril


  (Zestril)  20 mg  DAILY


 PO


   10/8/21 09:00


 21 08:59  10/8/21 09:41





 


 Sotalol HCl


  (Betapace)  40 mg  BID


 PO


   10/7/21 21:00


 21 20:59  10/8/21 09:40





 


 Pantoprazole


 Sodium


  (Protonix)  40 mg  DAILY


 PO


   10/8/21 09:00


 21 08:59  10/8/21 09:40





 


 Ceftriaxone


 Sodium 1000 mg/


 Sodium Chloride  100 ml @ 


 100 mls/hr  Q24HRS


 IV


   10/7/21 17:00


 21 16:59  10/7/21 18:52





 


 Sodium Chloride  100 ml @ ud  STK-MED ONCE


 IV


   10/7/21 18:35


 10/7/21 18:35 DC  





 


 Ceftriaxone Sodium


  (Rocephin)  1,000 mg  STK-MED ONCE


 .ROUTE


   10/7/21 18:35


 10/7/21 18:35 DC  





 


 Sodium Chloride  250 ml @ ud  STK-MED ONCE


 .ROUTE


   10/7/21 18:35


 10/7/21 18:36 DC  





 


 Enoxaparin Sodium


  (Lovenox)  100 mg  Q12HR


 SQ


   10/8/21 21:00


 21 20:59  10/8/21 13:41





 


 Enoxaparin Sodium


  (Lovenox)  100 mg  STK-MED ONCE


 SQ


   10/8/21 13:40


 10/8/21 13:40 DC  





 


 Warfarin Sodium


  (Coumadin)  7.5 mg  DAILY24


 PO


   10/8/21 14:30


 21 14:29 UNV  














Assessment/Plan


40-year-old male with history of atrial fibrillation, CAD, presented emergency 

room with right leg swelling and chest pain, hemoptysis.  Work-up patient was 

found to have PE, DVT, A. fib with RVR.





Plan


We will switch the patient to Lovenox


We will start the patient on warfarin, 


Daily INR/CBC


We will continue Lovenox until INR therapeutic


Problems:  


(1) Right leg DVT


Status:  Acute


ICD Code:  I82.401 - Acute embolism and thrombosis of unspecified deep veins of 

right lower extremity


SNOMED:  744146444


(2) Pulmonary embolism


ICD Code:  I26.99 - Other pulmonary embolism without acute cor pulmonale


SNOMED:  06327914


(3) Atrial fibrillation with RVR


ICD Code:  I48.91 - Unspecified atrial fibrillation


SNOMED:  581515579478498


(4) CAD (coronary artery disease)


ICD Code:  I25.10 - Atherosclerotic heart disease of native coronary artery 

without angina pectoris


SNOMED:  04018848











HERSON JAMES MD        Oct 8, 2021 14:54

## 2021-10-09 VITALS — SYSTOLIC BLOOD PRESSURE: 123 MMHG | DIASTOLIC BLOOD PRESSURE: 86 MMHG

## 2021-10-09 VITALS — SYSTOLIC BLOOD PRESSURE: 125 MMHG | DIASTOLIC BLOOD PRESSURE: 98 MMHG

## 2021-10-09 VITALS — DIASTOLIC BLOOD PRESSURE: 92 MMHG | SYSTOLIC BLOOD PRESSURE: 134 MMHG

## 2021-10-09 VITALS — DIASTOLIC BLOOD PRESSURE: 91 MMHG | SYSTOLIC BLOOD PRESSURE: 128 MMHG

## 2021-10-09 VITALS — DIASTOLIC BLOOD PRESSURE: 93 MMHG | SYSTOLIC BLOOD PRESSURE: 137 MMHG

## 2021-10-09 LAB
BASOPHILS # BLD AUTO: 0.1 10^3/UL (ref 0–0.1)
BASOPHILS NFR BLD AUTO: 1.1 % (ref 0–0.2)
CALCIUM SERPL-MCNC: 8.5 MG/DL (ref 8.4–10.5)
CO2 BLDA-SCNC: 28.7 MMOL/L (ref 20–32)
EOSINOPHIL # BLD AUTO: 0.2 10^3/UL (ref 0–0.2)
EOSINOPHIL NFR BLD AUTO: 2.4 % (ref 0–5)
ERYTHROCYTE [DISTWIDTH] IN BLOOD BY AUTOMATED COUNT: 14 % (ref 11.5–14.5)
GLUCOSE PRE 100 G GLC PO SERPL-MCNC: 100 MG/DL (ref 70–110)
LYMPHOCYTES # BLD AUTO: 1.84 10^3/UL1 (ref 1–4.8)
LYMPHOCYTES NFR BLD AUTO: 30 % (ref 24–44)
MCH RBC QN AUTO: 35.5 PG (ref 26–34)
MONOCYTES # BLD AUTO: 0.5 10^3/UL (ref 0.3–0.8)
MONOCYTES NFR BLD AUTO: 8.6 % (ref 5–12)
NEUTROPHILS # BLD AUTO: 3.5 10^3/UL (ref 1.8–7.7)
NEUTROPHILS NFR BLD AUTO: 57.9 % (ref 41–85)
PLATELET # BLD AUTO: 309 10^3/UL (ref 150–400)

## 2021-10-09 RX ADMIN — SOTALOL HYDROCHLORIDE SCH MG: 80 TABLET ORAL at 20:39

## 2021-10-09 RX ADMIN — LISINOPRIL SCH MG: 20 TABLET ORAL at 09:49

## 2021-10-09 RX ADMIN — ENOXAPARIN SODIUM SCH MG: 100 INJECTION SUBCUTANEOUS at 20:38

## 2021-10-09 RX ADMIN — DILTIAZEM HYDROCHLORIDE SCH MG: 60 TABLET, FILM COATED ORAL at 09:00

## 2021-10-09 RX ADMIN — SOTALOL HYDROCHLORIDE SCH MG: 80 TABLET ORAL at 09:50

## 2021-10-09 RX ADMIN — DILTIAZEM HYDROCHLORIDE SCH MG: 60 TABLET, FILM COATED ORAL at 15:00

## 2021-10-09 RX ADMIN — DILTIAZEM HYDROCHLORIDE SCH MG: 60 TABLET, FILM COATED ORAL at 20:42

## 2021-10-09 RX ADMIN — WARFARIN SODIUM SCH MG: 5 TABLET ORAL at 15:45

## 2021-10-09 RX ADMIN — ENOXAPARIN SODIUM SCH MG: 100 INJECTION SUBCUTANEOUS at 09:48

## 2021-10-09 RX ADMIN — PANTOPRAZOLE SODIUM SCH MG: 40 TABLET, DELAYED RELEASE ORAL at 09:49

## 2021-10-09 RX ADMIN — CEFTRIAXONE SODIUM SCH MLS/HR: 1 INJECTION, POWDER, FOR SOLUTION INTRAMUSCULAR; INTRAVENOUS at 17:00

## 2021-10-09 NOTE — PRM.PN
Subjective


Subjective


Date:  Oct 9, 2021


Time:  09:00


Subjective


Patient is feeling better, chest pain improved, currently on Lovenox, warfarin 

started yesterday. Patient unable to afford Eliquis or new oral anticoagulants.


Patient History:  


FH: esophageal cancer


  32 MOTHER, 


FH: suicide


  33 FATHER, , Age:30's - 40





VTE


VTE Risk Total Score:  1


VTE Risk Score


VTE Risk:


Score 0-1 = Low Risk


(Aggressive mobilization; early ambulation; no VTE prophylaxis required)


Score 2: Moderate Risk


(Intermittent/Pneumatic Compression Device OR Lovenox/Heparin/Coumadin)


Score 3-4: High Risk


(Intermittent/Pneumatic Compression Device AND Lovenox/Heparin/Coumadin)


Score  > or =5: Highest Risk


(Intermittent/Pneumatic Compression Device AND Lovenox/Heparin/Coumadin)


Antico:Hep/LMWH/Coum/Xarelto:  Yes





Review of Systems


Respiratory:  Shortness of breath, SOB with excertion


Cardiovascular:  Chest Pain, Palpitations, Other


Musculoskeletal:  other (Right leg swelling and pain)


Allergies:  


Coded Allergies:  


     No Known Allergies (Unverified , 16)


Scheduled


Apixaban (Eliquis), 5 MG PO BID


Diltiazem Hcl (Cardizem), 60 MG PO TID


Lisinopril (Lisinopril), 20 MG PO DAILY


Omeprazole Magnesium (Prilosec Otc), 1 TAB PO QD


Sotalol Hcl (Sotalol), 0.5 TAB PO BID





Objective


Vitals and I/O





Vital Sign - Last 24 Hours








 10/8/21 10/8/21 10/9/21 10/9/21





 20:29 20:37 00:05 02:18


 


Temp 98.2  98.3 


 


Pulse 80 80 67 


 


Resp 18  16 


 


B/P (MAP) 130/94 (106) 130/94 137/93 (108) 


 


Pulse Ox 99  96 


 


O2 Delivery    Room Air


 


    





 10/9/21 10/9/21 10/9/21 10/9/21





 04:02 08:07 09:00 09:49


 


Temp 98.4 98.0  


 


Pulse 68 58 64 


 


Resp 16 16  


 


B/P (MAP) 128/91 (103) 134/92 (106) 134/92 134/92


 


Pulse Ox 97 98  


 


O2 Delivery  Room Air  


 


    





 10/9/21 10/9/21 10/9/21 





 09:58 14:10 15:00 


 


Temp  98.4  


 


Pulse  58 58 


 


Resp  18  


 


B/P (MAP)  125/98 (107) 125/98 


 


Pulse Ox  99  


 


O2 Delivery Room Air Room Air  














Intake and Output 


 


 10/9/21





 07:00


 


Intake Total 2350 ml


 


Balance 2350 ml








General:  Alert, Oriented X3, moderate distress


HEENT:  Atraumatic, PERRLA


Lungs:  Other (Fair bilateral air entry)


Heart:  Other (Irregular irregular)


Abdomen:  Normal bowel sounds, Soft


Extremities:  No clubbing, Other (Right leg swollen, tender and warm)


Neuro:  Normal speech, Strength at 5/5 X4 ext, Sensation intact


Psych/Mental Status:  Mental status NL, Mood NL


All Results(Lab/Rad)





Laboratory Tests








Test


 10/7/21


17:05 10/7/21


21:37 10/8/21


02:29 10/8/21


07:18


 


Activated Partial


Thromboplast Time 25.9 SEC 


 27.0 SEC 


 29.2 SEC 


 26.7 SEC 





 


White Blood Count    6.4 10^3/uL 


 


Red Blood Count    5.05 10^6/uL 


 


Hemoglobin    17.7 g/dL 


 


Hematocrit    53.2 % 


 


Mean Corpuscular Volume    105.3 fL 


 


Mean Corpuscular Hemoglobin    35.0 pg 


 


Mean Corpuscular Hemoglobin


Concent 


 


 


 33.3 g/dL 





 


Red Cell Distribution Width    13.9 % 


 


Platelet Count    308 10^3/uL 


 


Mean Platelet Volume    8.4 fL 


 


Neutrophils (%) (Auto)    68.1 % 


 


Lymphocytes (%) (Auto)    20.2 % 


 


Monocytes (%) (Auto)    8.6 % 


 


Neutrophils # (Auto)    4.3 10^3/uL 


 


Lymphocytes # (Auto)    1.29 10^3/uL1 


 


Monocytes # (Auto)    0.6 10^3/uL 


 


Absolute Immature Granulocyte


(auto 


 


 


 0.02 10^3 u/L 





 


Absolute Eosinophils (auto)    0.1 10^3/uL 


 


Immature Granulocytes %    0.30 % 


 


Eosinophils %    1.7 % 


 


Basophils %    1.1 % 


 


Basophils #    0.1 10^3/uL 


 


Sodium Level    139 mmol/L 


 


Potassium Level    4.0 mmol/L 


 


Chloride Level    103.0 mmol/L 


 


Carbon Dioxide Level    30.2 mmol/L 


 


Anion Gap    9.8 


 


Blood Urea Nitrogen    4 mg/dL 


 


Creatinine    0.88 mg/dL 


 


Estimated GFR (


American) 


 


 


 116.1 





 


Est GFR (CKD-EPI)(Non-Afr


American) 


 


 


 95.9 





 


BUN/Creatinine Ratio    4.0 


 


Glucose Level    109 mg/dL 


 


Calcium Level    8.2 mg/dL 


 


Total Bilirubin    0.8 mg/dL 


 


Aspartate Amino Transf


(AST/SGOT) 


 


 


 30 U/L 





 


Alanine Aminotransferase


(ALT/SGPT) 


 


 


 19 U/L 





 


Alkaline Phosphatase    152 U/L 


 


Total Protein    6.5 g/dL 


 


Albumin    2.4 g/dL 


 


Globulin    4.1 


 


Albumin/Globulin Ratio    0.585 


 


Test


 10/8/21


12:17 


 


 





 


Activated Partial


Thromboplast Time 25.2 SEC 


 


 


 











Current Medications








 Medications


  (Trade)  Dose


 Ordered  Sig/Foreign


 Route


 PRN Reason  Start Time


 Stop Time Status Last Admin


Dose Admin


 


 Albuterol/


 Ipratropium


  (Duo 0.5-3(2.5)


 Mg/3 ml)  3 ml  STAT  STAT


 IH


   10/7/21 08:34


 10/7/21 08:35 DC 10/7/21 08:52





 


 Albuterol/


 Ipratropium


  (Duo 0.5-3(2.5)


 Mg/3 ml)  3 ml  STK-MED ONCE


 IH


   10/7/21 08:52


 10/7/21 08:52 DC  





 


 Heparin Sodium/


 Dextrose  500 ml @ 0


 mls/hr  TITRATE  STAT


 IV


   10/7/21 10:53


 10/7/21 10:58 DC 10/7/21 11:23





 


 Heparin Sodium/


 Dextrose  500 ml @ 0


 mls/hr  TITRATE


 IV


   10/7/21 11:00


 10/8/21 09:24 DC  





 


 Heparin Sodium


  (Porcine)


  (Heparin Sodium)  1,764 unit  OT  STAT


 IV


   10/7/21 10:58


 10/7/21 11:00 DC 10/7/21 11:24





 


 Diltiazem HCl


  (Cardizem)  25 mg  STAT  STAT


 IV


   10/7/21 11:00


 10/7/21 11:02 DC 10/7/21 11:24





 


 Heparin Sodium/


 Dextrose  500 ml @ ud  STK-MED ONCE


 IV


   10/7/21 11:09


 10/7/21 11:09 DC  





 


 Heparin Sodium


  (Porcine)


  (Heparin)  5,000 unit  STK-MED ONCE


 .ROUTE


   10/7/21 11:09


 10/7/21 11:09 DC  





 


 Diltiazem HCl


  (Cardizem)  50 mg  STK-MED ONCE


 IV


   10/7/21 11:15


 10/7/21 11:16 DC  





 


 Acetaminophen


  (Tylenol)  1,000 mg  Q6H  PRN


 PO


 PAIN 1 - 3  10/7/21 16:30


 21 16:29   





 


 Zolpidem Tartrate


  (Ambien)  5 mg  HS  PRN


 PO


 INSOMNIA  10/7/21 16:30


 21 16:29   





 


 Acetaminophen/


 Hydrocodone Bitart


  (Norco 5mg)  1 ea  Q4H  PRN


 PO


 PAIN 4 - 6  10/7/21 16:30


 21 16:29   





 


 Diltiazem HCl


  (Cardizem)  30 mg  Q6HR


 PO


   10/7/21 18:00


 10/7/21 16:36 DC  





 


 Diltiazem HCl


  (Cardizem)  60 mg  TID


 PO


   10/7/21 21:00


 21 20:59  10/8/21 09:41





 


 Lisinopril


  (Zestril)  20 mg  DAILY


 PO


   10/8/21 09:00


 21 08:59  10/8/21 09:41





 


 Sotalol HCl


  (Betapace)  40 mg  BID


 PO


   10/7/21 21:00


 21 20:59  10/8/21 09:40





 


 Pantoprazole


 Sodium


  (Protonix)  40 mg  DAILY


 PO


   10/8/21 09:00


 21 08:59  10/8/21 09:40





 


 Ceftriaxone


 Sodium 1000 mg/


 Sodium Chloride  100 ml @ 


 100 mls/hr  Q24HRS


 IV


   10/7/21 17:00


 21 16:59  10/7/21 18:52





 


 Sodium Chloride  100 ml @ ud  STK-MED ONCE


 IV


   10/7/21 18:35


 10/7/21 18:35 DC  





 


 Ceftriaxone Sodium


  (Rocephin)  1,000 mg  STK-MED ONCE


 .ROUTE


   10/7/21 18:35


 10/7/21 18:35 DC  





 


 Sodium Chloride  250 ml @ ud  STK-MED ONCE


 .ROUTE


   10/7/21 18:35


 10/7/21 18:36 DC  





 


 Enoxaparin Sodium


  (Lovenox)  100 mg  Q12HR


 SQ


   10/8/21 21:00


 21 20:59  10/8/21 13:41





 


 Enoxaparin Sodium


  (Lovenox)  100 mg  STK-MED ONCE


 SQ


   10/8/21 13:40


 10/8/21 13:40 DC  





 


 Warfarin Sodium


  (Coumadin)  7.5 mg  DAILY24


 PO


   10/8/21 14:30


 21 14:29 UNV  














Assessment/Plan


40-year-old male with history of atrial fibrillation, CAD, presented emergency 

room with right leg swelling and chest pain, hemoptysis.  Work-up patient was 

found to have PE, DVT, A. fib with RVR.





Plan


Started on warfarin, patient cannot afford to pay for Eliquis


Continue Lovenox, for now


Check INR daily


Continue current meds


Plan patient to be discharged home once INR is therapeutic, patient will be 

discharged on oral warfarin


Problems:  


(1) Pulmonary embolism


ICD Code:  I26.99 - Other pulmonary embolism without acute cor pulmonale


SNOMED:  34092805


(2) Right leg DVT


Status:  Acute


ICD Code:  I82.401 - Acute embolism and thrombosis of unspecified deep veins of 

right lower extremity


SNOMED:  164022669


(3) CAD (coronary artery disease)


ICD Code:  I25.10 - Atherosclerotic heart disease of native coronary artery 

without angina pectoris


SNOMED:  32135390


(4) Atrial fibrillation with RVR


ICD Code:  I48.91 - Unspecified atrial fibrillation


SNOMED:  909908901302688


(5) Atrial fibrillation with rapid ventricular response


ICD Code:  I48.91 - Unspecified atrial fibrillation


SNOMED:  099468897020705











HERSON JAMES MD        Oct 9, 2021 16:16

## 2021-10-09 NOTE — PCM.ECHO
--------------- APPROVED REPORT --------------





EXAM: Comprehensive 2D, Doppler, and color-flow Echocardiogram.



Patient Location: IN-PATIENT



Indications

Large Pulmonary Embolism, assess RV



2D Dimensions

LVOT Diameter            2.38 (1.8-2.4cm)     LVEF(%)                  57.80 (>50%)         























M-Mode Dimensions

RVDd                     2.20 (2.1-3.2cm)     Left Atrium(MM)          4.85 (2.5-4.0cm)     

IVSd                     1.20 (0.7-1.1cm)     Aortic Root              3.70 (2.2-3.7cm)     

LVDd                     4.90 (4.0-5.6cm)     Aortic Cusp Exc          2.25 (1.5-2.0cm)     

PWd                      0.60 (0.7-1.1cm)     MV EPSS                  0.62 (<0.5cm)        

IVSs                     1.50 cm              FS (%)                   27.95 %              

LVDs                     3.50 (2.0-3.8cm)     ESV(Teich)               52.12 ml             

PWs                      1.30 cm              LVEF(%)                  53.99 (>50%)         





Volumes

Biplane 2D LV Volumes                               Biplane 2D LA Volumes                   





LVEDv A4C                           135.01 mL       LA ESV Index                            

LVESv A4C                           56.98 mL                                                





Aortic Valve

AoV Peak Sheldon.           1.10 m/s              AoV VTI                 19.70 cm              

AO Peak GR.             5.10 mmHg             AO Mean GR.             3.05 mmHg             

LVOT  VTI               18.59 cm              LVOT Peak Sheldon.          0.86 m/s              

HASMUKH(VTI)/BSA            4.19 cm2/m2           HASMUKH   (VTI)             4.19 cm2              

AI P 1/2 Time           579.60 ms                                                           





Mitral Valve

MR Peak Gr.          13.35mmHg                                                              













TDI

Lateral E' P. V         0.06m/s               Medial E' P. V          0.07m/s               









Pulmonary Valve

PV Peak Velocity        0.80m/s               PV Peak Grad.           2.70mmHg              

RVOT VTI                17.37cm                                                             



Tricuspid Valve

TR P. Velocity          1.55m/s               RAP ESTIMATE            10.00mmHg             

TR Peak Gr.             9.86mmHg              RVSP                    19.86mmHg             





 LEFT VENTRICLE 

The left ventricle is normal size. The left ventricular systolic function is normal. The left 

ventricular ejection fraction is within the normal range. There is normal left ventricular wall 

thickness. There is normal LV segmental wall motion. There is no ventricular septal defect 

visualized. No left ventricle thrombus noted on this study. LVEF is 55-60%.



 RIGHT VENTRICLE 

The right ventricle is normal size. The right ventricular systolic function is normal. There is 

normal right ventricular wall thickness.



 ATRIA 

The left atrium size is normal. The right atrium size is normal. The interatrial septum is intact 

with no evidence for an atrial septal defect.



 AORTIC VALVE 

The aortic valve is normal in structure. There is no aortic valvular stenosis. Moderate to severe 

aortic regurgitation There is no aortic valvular vegetation.



 MITRAL VALVE 

The mitral valve is normal in structure. There is no mitral valve stenosis. Mild mitral 

regurgitation. There is no evidence of mitral valve vegetations.



 TRICUSPID VALVE 

The tricuspid valve is normal in structure. There is no tricuspid valve stenosis. Mild tricuspid 

regurgitation. There is no tricuspid valve vegetations.



 PULMONIC VALVE 

Pulmonic valve is not well visualized. There is no pulmonic valvular stenosis. There is no pulmonic 

valvular regurgitation. 



 GREAT VESSELS 

The aortic root is normal in size. Pulmonary artery is not well visualized. Aortic arch is not well 

visualized. The IVC is normal in size and collapses >50% with inspiration.



 PERICARDIUM

There is no pericardial effusion. There is no pleural effusion.



Other Information 

Study Quality: Fair



<Conclusion>

The left ventricular systolic function is normal.

LVEF is 55-60%.

Mild mitral regurgitation.

Moderate to severe aortic  regurgitation

Mild tricuspid regurgitation.



Electronically signed by : BROOKLYN MCGRATH.   10/09/2021 01:54:43

## 2021-10-10 VITALS — DIASTOLIC BLOOD PRESSURE: 98 MMHG | SYSTOLIC BLOOD PRESSURE: 148 MMHG

## 2021-10-10 VITALS — DIASTOLIC BLOOD PRESSURE: 76 MMHG | SYSTOLIC BLOOD PRESSURE: 122 MMHG

## 2021-10-10 VITALS — DIASTOLIC BLOOD PRESSURE: 77 MMHG | SYSTOLIC BLOOD PRESSURE: 119 MMHG

## 2021-10-10 VITALS — DIASTOLIC BLOOD PRESSURE: 80 MMHG | SYSTOLIC BLOOD PRESSURE: 123 MMHG

## 2021-10-10 VITALS — DIASTOLIC BLOOD PRESSURE: 79 MMHG | SYSTOLIC BLOOD PRESSURE: 122 MMHG

## 2021-10-10 VITALS — SYSTOLIC BLOOD PRESSURE: 118 MMHG | DIASTOLIC BLOOD PRESSURE: 75 MMHG

## 2021-10-10 LAB
BASOPHILS # BLD AUTO: 0.1 10^3/UL (ref 0–0.1)
BASOPHILS NFR BLD AUTO: 1.3 % (ref 0–0.2)
CALCIUM SERPL-MCNC: 8.5 MG/DL (ref 8.4–10.5)
CO2 BLDA-SCNC: 29.1 MMOL/L (ref 20–32)
EOSINOPHIL # BLD AUTO: 0.1 10^3/UL (ref 0–0.2)
EOSINOPHIL NFR BLD AUTO: 2.3 % (ref 0–5)
ERYTHROCYTE [DISTWIDTH] IN BLOOD BY AUTOMATED COUNT: 14.2 % (ref 11.5–14.5)
GLUCOSE PRE 100 G GLC PO SERPL-MCNC: 90 MG/DL (ref 70–110)
LYMPHOCYTES # BLD AUTO: 1.69 10^3/UL1 (ref 1–4.8)
LYMPHOCYTES NFR BLD AUTO: 30.2 % (ref 24–44)
MCH RBC QN AUTO: 36.1 PG (ref 26–34)
MONOCYTES # BLD AUTO: 0.6 10^3/UL (ref 0.3–0.8)
MONOCYTES NFR BLD AUTO: 10.2 % (ref 5–12)
NEUTROPHILS # BLD AUTO: 3.1 10^3/UL (ref 1.8–7.7)
NEUTROPHILS NFR BLD AUTO: 56 % (ref 41–85)
PLATELET # BLD AUTO: 283 10^3/UL (ref 150–400)

## 2021-10-10 RX ADMIN — LISINOPRIL SCH MG: 20 TABLET ORAL at 08:29

## 2021-10-10 RX ADMIN — SOTALOL HYDROCHLORIDE SCH MG: 80 TABLET ORAL at 20:26

## 2021-10-10 RX ADMIN — SOTALOL HYDROCHLORIDE SCH MG: 80 TABLET ORAL at 08:29

## 2021-10-10 RX ADMIN — DILTIAZEM HYDROCHLORIDE SCH MG: 60 TABLET, FILM COATED ORAL at 20:26

## 2021-10-10 RX ADMIN — PANTOPRAZOLE SODIUM SCH MG: 40 TABLET, DELAYED RELEASE ORAL at 08:29

## 2021-10-10 RX ADMIN — ENOXAPARIN SODIUM SCH MG: 100 INJECTION SUBCUTANEOUS at 20:26

## 2021-10-10 RX ADMIN — WARFARIN SODIUM SCH MG: 5 TABLET ORAL at 14:30

## 2021-10-10 RX ADMIN — ENOXAPARIN SODIUM SCH MG: 100 INJECTION SUBCUTANEOUS at 08:29

## 2021-10-10 RX ADMIN — DILTIAZEM HYDROCHLORIDE SCH MG: 60 TABLET, FILM COATED ORAL at 15:50

## 2021-10-10 RX ADMIN — DILTIAZEM HYDROCHLORIDE SCH MG: 60 TABLET, FILM COATED ORAL at 08:29

## 2021-10-10 RX ADMIN — Medication SCH EACH: at 19:38

## 2021-10-10 RX ADMIN — CEFTRIAXONE SODIUM SCH MLS/HR: 1 INJECTION, POWDER, FOR SOLUTION INTRAMUSCULAR; INTRAVENOUS at 17:01

## 2021-10-10 NOTE — PRM.PN
Subjective


Subjective


Date:  Oct 10, 2021


Time:  09:20


Subjective


Patient resting in bed in no acute distress. Reports the "swelling in my leg is 

10 x better today". Denies any shortness of breath, chest pain or palpitation


Patient History:  


FH: esophageal cancer


  32 MOTHER, 


FH: suicide


  33 FATHER, , Age:30's - 40





VTE


VTE Risk Total Score:  1


VTE Risk Score


VTE Risk:


Score 0-1 = Low Risk


(Aggressive mobilization; early ambulation; no VTE prophylaxis required)


Score 2: Moderate Risk


(Intermittent/Pneumatic Compression Device OR Lovenox/Heparin/Coumadin)


Score 3-4: High Risk


(Intermittent/Pneumatic Compression Device AND Lovenox/Heparin/Coumadin)


Score  > or =5: Highest Risk


(Intermittent/Pneumatic Compression Device AND Lovenox/Heparin/Coumadin)


Antico:Hep/LMWH/Coum/Xarelto:  Yes





Review of Systems


Constitutional:  No: Fever, Chills, Sweats, Weakness


Eyes:  No: Pain, Vision change


ENT:  No: Ear pain, Ear discharge, Nose pain, Nose discharge, Mouth pain


Respiratory:  Shortness of breath, SOB with excertion


Cardiovascular:  Chest Pain, Palpitations, Other


Gastrointestinal:  No: Nausea, Vomiting, Abdominal Pain, Diarrhea, Constipation,

Melena, Hematochezia


Genitourinary:  No Dysuria, No Frequency, No Hematuria


Musculoskeletal:  other (Right leg swelling and pain)


Skin:  Other (right leg swelling)


Neurological:  No: Weakness, Numbness, Incoordination


Allergies:  


Coded Allergies:  


     No Known Allergies (Unverified , 16)


Scheduled


Apixaban (Eliquis), 5 MG PO BID


Diltiazem Hcl (Cardizem), 60 MG PO TID


Lisinopril (Lisinopril), 20 MG PO DAILY


Omeprazole Magnesium (Prilosec Otc), 1 TAB PO QD


Sotalol Hcl (Sotalol), 0.5 TAB PO BID





Objective


Vitals and I/O





Vital Sign - Last 24 Hours








 10/10/21 10/10/21 10/10/21 10/10/21





 07:35 08:29 08:29 10:25


 


Temp 98.3   


 


Pulse 68 68  


 


Resp 15   


 


B/P (MAP) 119/77 (91) 119/77 119/77 


 


Pulse Ox 100   


 


O2 Delivery    Room Air


 


    





 10/10/21 10/10/21 10/10/21 





 10:55 15:50 16:40 


 


Temp 97.9  98.4 


 


Pulse 64 64 60 


 


Resp 16  16 


 


B/P (MAP) 118/75 (89) 118/75 122/76 (91) 


 


Pulse Ox 100  95 


 


O2 Delivery   Room Air 








General:  Alert, Oriented X3, No acute distress, moderate distress


HEENT:  Atraumatic, PERRLA


Lungs:  Other (Fair bilateral air entry)


Heart:  Other (Irregular irregular)


Abdomen:  Normal bowel sounds, Soft


Extremities:  No clubbing, Other (Right leg swollen, tender and warm)


Neuro:  Normal speech, Strength at 5/5 X4 ext, Sensation intact


Psych/Mental Status:  Mental status NL, Mood NL


All Results(Lab/Rad)





Laboratory Tests








Test


 10/7/21


17:05 10/7/21


21:37 10/8/21


02:29 10/8/21


07:18


 


Activated Partial


Thromboplast Time 25.9 SEC 


 27.0 SEC 


 29.2 SEC 


 26.7 SEC 





 


White Blood Count    6.4 10^3/uL 


 


Red Blood Count    5.05 10^6/uL 


 


Hemoglobin    17.7 g/dL 


 


Hematocrit    53.2 % 


 


Mean Corpuscular Volume    105.3 fL 


 


Mean Corpuscular Hemoglobin    35.0 pg 


 


Mean Corpuscular Hemoglobin


Concent 


 


 


 33.3 g/dL 





 


Red Cell Distribution Width    13.9 % 


 


Platelet Count    308 10^3/uL 


 


Mean Platelet Volume    8.4 fL 


 


Neutrophils (%) (Auto)    68.1 % 


 


Lymphocytes (%) (Auto)    20.2 % 


 


Monocytes (%) (Auto)    8.6 % 


 


Neutrophils # (Auto)    4.3 10^3/uL 


 


Lymphocytes # (Auto)    1.29 10^3/uL1 


 


Monocytes # (Auto)    0.6 10^3/uL 


 


Absolute Immature Granulocyte


(auto 


 


 


 0.02 10^3 u/L 





 


Absolute Eosinophils (auto)    0.1 10^3/uL 


 


Immature Granulocytes %    0.30 % 


 


Eosinophils %    1.7 % 


 


Basophils %    1.1 % 


 


Basophils #    0.1 10^3/uL 


 


Sodium Level    139 mmol/L 


 


Potassium Level    4.0 mmol/L 


 


Chloride Level    103.0 mmol/L 


 


Carbon Dioxide Level    30.2 mmol/L 


 


Anion Gap    9.8 


 


Blood Urea Nitrogen    4 mg/dL 


 


Creatinine    0.88 mg/dL 


 


Estimated GFR (


American) 


 


 


 116.1 





 


Est GFR (CKD-EPI)(Non-Afr


American) 


 


 


 95.9 





 


BUN/Creatinine Ratio    4.0 


 


Glucose Level    109 mg/dL 


 


Calcium Level    8.2 mg/dL 


 


Total Bilirubin    0.8 mg/dL 


 


Aspartate Amino Transf


(AST/SGOT) 


 


 


 30 U/L 





 


Alanine Aminotransferase


(ALT/SGPT) 


 


 


 19 U/L 





 


Alkaline Phosphatase    152 U/L 


 


Total Protein    6.5 g/dL 


 


Albumin    2.4 g/dL 


 


Globulin    4.1 


 


Albumin/Globulin Ratio    0.585 


 


Test


 10/8/21


12:17 


 


 





 


Activated Partial


Thromboplast Time 25.2 SEC 


 


 


 











Current Medications








 Medications


  (Trade)  Dose


 Ordered  Sig/Foreign


 Route


 PRN Reason  Start Time


 Stop Time Status Last Admin


Dose Admin


 


 Albuterol/


 Ipratropium


  (Duo 0.5-3(2.5)


 Mg/3 ml)  3 ml  STAT  STAT


 IH


   10/7/21 08:34


 10/7/21 08:35 DC 10/7/21 08:52





 


 Albuterol/


 Ipratropium


  (Duo 0.5-3(2.5)


 Mg/3 ml)  3 ml  STK-MED ONCE


 IH


   10/7/21 08:52


 10/7/21 08:52 DC  





 


 Heparin Sodium/


 Dextrose  500 ml @ 0


 mls/hr  TITRATE  STAT


 IV


   10/7/21 10:53


 10/7/21 10:58 DC 10/7/21 11:23





 


 Heparin Sodium/


 Dextrose  500 ml @ 0


 mls/hr  TITRATE


 IV


   10/7/21 11:00


 10/8/21 09:24 DC  





 


 Heparin Sodium


  (Porcine)


  (Heparin Sodium)  1,764 unit  OT  STAT


 IV


   10/7/21 10:58


 10/7/21 11:00 DC 10/7/21 11:24





 


 Diltiazem HCl


  (Cardizem)  25 mg  STAT  STAT


 IV


   10/7/21 11:00


 10/7/21 11:02 DC 10/7/21 11:24





 


 Heparin Sodium/


 Dextrose  500 ml @ ud  STK-MED ONCE


 IV


   10/7/21 11:09


 10/7/21 11:09 DC  





 


 Heparin Sodium


  (Porcine)


  (Heparin)  5,000 unit  STK-MED ONCE


 .ROUTE


   10/7/21 11:09


 10/7/21 11:09 DC  





 


 Diltiazem HCl


  (Cardizem)  50 mg  STK-MED ONCE


 IV


   10/7/21 11:15


 10/7/21 11:16 DC  





 


 Acetaminophen


  (Tylenol)  1,000 mg  Q6H  PRN


 PO


 PAIN 1 - 3  10/7/21 16:30


 21 16:29   





 


 Zolpidem Tartrate


  (Ambien)  5 mg  HS  PRN


 PO


 INSOMNIA  10/7/21 16:30


 21 16:29   





 


 Acetaminophen/


 Hydrocodone Bitart


  (Norco 5mg)  1 ea  Q4H  PRN


 PO


 PAIN 4 - 6  10/7/21 16:30


 21 16:29   





 


 Diltiazem HCl


  (Cardizem)  30 mg  Q6HR


 PO


   10/7/21 18:00


 10/7/21 16:36 DC  





 


 Diltiazem HCl


  (Cardizem)  60 mg  TID


 PO


   10/7/21 21:00


 21 20:59  10/8/21 09:41





 


 Lisinopril


  (Zestril)  20 mg  DAILY


 PO


   10/8/21 09:00


 21 08:59  10/8/21 09:41





 


 Sotalol HCl


  (Betapace)  40 mg  BID


 PO


   10/7/21 21:00


 21 20:59  10/8/21 09:40





 


 Pantoprazole


 Sodium


  (Protonix)  40 mg  DAILY


 PO


   10/8/21 09:00


 21 08:59  10/8/21 09:40





 


 Ceftriaxone


 Sodium 1000 mg/


 Sodium Chloride  100 ml @ 


 100 mls/hr  Q24HRS


 IV


   10/7/21 17:00


 21 16:59  10/7/21 18:52





 


 Sodium Chloride  100 ml @ ud  STK-MED ONCE


 IV


   10/7/21 18:35


 10/7/21 18:35 DC  





 


 Ceftriaxone Sodium


  (Rocephin)  1,000 mg  STK-MED ONCE


 .ROUTE


   10/7/21 18:35


 10/7/21 18:35 DC  





 


 Sodium Chloride  250 ml @ ud  STK-MED ONCE


 .ROUTE


   10/7/21 18:35


 10/7/21 18:36 DC  





 


 Enoxaparin Sodium


  (Lovenox)  100 mg  Q12HR


 SQ


   10/8/21 21:00


 21 20:59  10/8/21 13:41





 


 Enoxaparin Sodium


  (Lovenox)  100 mg  STK-MED ONCE


 SQ


   10/8/21 13:40


 10/8/21 13:40 DC  





 


 Warfarin Sodium


  (Coumadin)  7.5 mg  DAILY24


 PO


   10/8/21 14:30


 21 14:29 UNV  














Course


Sepsis Screening Results: Posi:  


NEGATIVE


Sepsis Qualifier/Stage:  NO DEFINITE RISK


DATE SEEN BY PHYSICIAN:  Oct 10, 2021


TIME SEEN BY PROVIDER:  09:20


Duration or Total Time Spent w:  45 min


Vitals & review Data





Vital Sign - Last 24 Hours








 10/10/21 10/10/21 10/10/21 10/10/21





 07:35 08:29 08:29 10:25


 


Temp 98.3   


 


Pulse 68 68  


 


Resp 15   


 


B/P (MAP) 119/77 (91) 119/77 119/77 


 


Pulse Ox 100   


 


O2 Delivery    Room Air


 


    





 10/10/21 10/10/21 10/10/21 





 10:55 15:50 16:40 


 


Temp 97.9  98.4 


 


Pulse 64 64 60 


 


Resp 16  16 


 


B/P (MAP) 118/75 (89) 118/75 122/76 (91) 


 


Pulse Ox 100  95 


 


O2 Delivery   Room Air 








Laboratory Tests








Test


 10/9/21


05:12 10/10/21


06:39


 


White Blood Count 6.1 10^3/uL  5.6 10^3/uL 


 


Red Blood Count 4.53 10^6/uL  4.49 10^6/uL 


 


Hemoglobin 16.1 g/dL  16.2 g/dL 


 


Hematocrit 47.9 %  48.6 % 


 


Mean Corpuscular Volume 105.7 fL  108.2 fL 


 


Mean Corpuscular Hemoglobin 35.5 pg  36.1 pg 


 


Mean Corpuscular Hemoglobin


Concent 33.6 g/dL 


 33.3 g/dL 





 


Red Cell Distribution Width 14.0 %  14.2 % 


 


Platelet Count 309 10^3/uL  283 10^3/uL 


 


Mean Platelet Volume 9.1 fL  9.2 fL 


 


Neutrophils (%) (Auto) 57.9 %  56.0 % 


 


Lymphocytes (%) (Auto) 30.0 %  30.2 % 


 


Monocytes (%) (Auto) 8.6 %  10.2 % 


 


Neutrophils # (Auto) 3.5 10^3/uL  3.1 10^3/uL 


 


Lymphocytes # (Auto) 1.84 10^3/uL1  1.69 10^3/uL1 


 


Monocytes # (Auto) 0.5 10^3/uL  0.6 10^3/uL 


 


Absolute Immature Granulocyte


(auto 0.03 10^3 u/L 


 0.04 10^3 u/L 





 


Absolute Eosinophils (auto) 0.2 10^3/uL  0.1 10^3/uL 


 


Immature Granulocytes % 0.50 %  0.70 % 


 


Eosinophils % 2.4 %  2.3 % 


 


Basophils % 1.1 %  1.3 % 


 


Basophils # 0.1 10^3/uL  0.1 10^3/uL 


 


Prothrombin Time 11.0 SEC  11.3 SEC 


 


Prothrombin Time INR


(Non-Therap) 1.0 


 1.1 





 


Sodium Level 138 mmol/L  141 mmol/L 


 


Potassium Level 3.7 mmol/L  3.7 mmol/L 


 


Chloride Level 103.0 mmol/L  106.0 mmol/L 


 


Carbon Dioxide Level 28.7 mmol/L  29.1 mmol/L 


 


Glucose Level 100 mg/dL  90 mg/dL 


 


Blood Urea Nitrogen 5 mg/dL  5 mg/dL 


 


Creatinine 0.83 mg/dL  1.04 mg/dL 


 


Calcium Level 8.5 mg/dL  8.5 mg/dL 


 


Anion Gap 10.0  9.6 


 


Estimated GFR (


American) 124.2 


 95.7 





 


Est GFR (CKD-EPI)(Non-Afr


American) 102.6 


 79.1 





 


BUN/Creatinine Ratio 6.0  4.0 








                               Current Medications








 Medications


  (Trade)  Dose


 Ordered  Sig/Foreign


 PRN Reason  Start Time


 Stop Time Status Last Admin


 


 Diltiazem HCl


  (Cardizem)  60 mg  TID


   10/7/21 21:00


 21 20:59  10/10/21 15:50





 


 Enoxaparin Sodium


  (Lovenox)  100 mg  Q12HR


   10/8/21 21:00


 21 20:59  10/10/21 08:29





 


 Lisinopril


  (Zestril)  20 mg  DAILY


   10/8/21 09:00


 21 08:59  10/10/21 08:29





 


 Nicotine


  (Nicotine 7mg


 Patch)  1 each  DAILY


   10/11/21 09:00


 11/10/21 08:59 UNV  





 


 Pantoprazole


 Sodium


  (Protonix)  40 mg  DAILY


   10/8/21 09:00


 21 08:59  10/10/21 08:29





 


 Sotalol HCl


  (Betapace)  40 mg  BID


   10/7/21 21:00


 21 20:59  10/10/21 08:29





 


 Warfarin Sodium


  (Coumadin)  7.5 mg  DAILY24


   10/8/21 14:30


 21 14:29  10/10/21 14:30











LEVEL 1 SEPSIS INFECTION CRITE:  Cough/Shortness of Breath


LEVEL 2-SIRS (LIST ALL THAT AP:  None/Not assessed


Cardiovascular Evidence:  Not Assessed or None


Hematologic Evidence:  None/Not assessed


Hepatic Evidence:  None/Not assessed


Metabolic Evidence:  None/Not assessed


Neurological Evidence:  None/Not assessed


Respiratory Evidence:  None/Not assessed


Renal Evidence:  None/Not assessed


O2 Sat by Pulse Oximetry:  95





Assessment/Plan


40-year-old male with history of atrial fibrillation, CAD, admitted for right 

leg swelling and chest pain, hemoptysis.  Work-up patient was found to have PE, 

DVT, A. fib with RVR.





ASSESSMENT


PE, DVT, A. fib with RVR.





PLAN


Started on warfarin, patient cannot afford to pay for Eliquis


On Rocephin 1g qd


Continue Lovenox, for now


continue Sotatol for arrhythmia


continue Lisinopril


Check INR daily (INR 1.1 on 10/10/21)


Continue current meds








Disposition: Plan patient to be discharged home once INR is therapeutic, patient

will be discharged on oral warfarin











ANGELINA WINSLOW NP           Oct 10, 2021 18:49

## 2021-10-11 VITALS — DIASTOLIC BLOOD PRESSURE: 81 MMHG | SYSTOLIC BLOOD PRESSURE: 131 MMHG

## 2021-10-11 VITALS — DIASTOLIC BLOOD PRESSURE: 88 MMHG | SYSTOLIC BLOOD PRESSURE: 132 MMHG

## 2021-10-11 VITALS — DIASTOLIC BLOOD PRESSURE: 86 MMHG | SYSTOLIC BLOOD PRESSURE: 137 MMHG

## 2021-10-11 VITALS — DIASTOLIC BLOOD PRESSURE: 88 MMHG | SYSTOLIC BLOOD PRESSURE: 131 MMHG

## 2021-10-11 VITALS — SYSTOLIC BLOOD PRESSURE: 131 MMHG | DIASTOLIC BLOOD PRESSURE: 90 MMHG

## 2021-10-11 LAB
BASOPHILS # BLD AUTO: 0.1 10^3/UL (ref 0–0.1)
BASOPHILS NFR BLD AUTO: 1.2 % (ref 0–0.2)
CALCIUM SERPL-MCNC: 8.7 MG/DL (ref 8.4–10.5)
CO2 BLDA-SCNC: 28.1 MMOL/L (ref 20–32)
EOSINOPHIL # BLD AUTO: 0.2 10^3/UL (ref 0–0.2)
EOSINOPHIL NFR BLD AUTO: 2.6 % (ref 0–5)
ERYTHROCYTE [DISTWIDTH] IN BLOOD BY AUTOMATED COUNT: 14.2 % (ref 11.5–14.5)
GLUCOSE PRE 100 G GLC PO SERPL-MCNC: 98 MG/DL (ref 70–110)
LYMPHOCYTES # BLD AUTO: 1.7 10^3/UL1 (ref 1–4.8)
LYMPHOCYTES NFR BLD AUTO: 29.2 % (ref 24–44)
MCH RBC QN AUTO: 34.7 PG (ref 26–34)
MONOCYTES # BLD AUTO: 0.7 10^3/UL (ref 0.3–0.8)
MONOCYTES NFR BLD AUTO: 11.5 % (ref 5–12)
NEUTROPHILS # BLD AUTO: 3.2 10^3/UL (ref 1.8–7.7)
NEUTROPHILS NFR BLD AUTO: 55.2 % (ref 41–85)
PLATELET # BLD AUTO: 285 10^3/UL (ref 150–400)

## 2021-10-11 RX ADMIN — SOTALOL HYDROCHLORIDE SCH MG: 80 TABLET ORAL at 20:52

## 2021-10-11 RX ADMIN — DILTIAZEM HYDROCHLORIDE SCH MG: 60 TABLET, FILM COATED ORAL at 09:00

## 2021-10-11 RX ADMIN — ENOXAPARIN SODIUM SCH MG: 100 INJECTION SUBCUTANEOUS at 09:00

## 2021-10-11 RX ADMIN — ENOXAPARIN SODIUM SCH MG: 100 INJECTION SUBCUTANEOUS at 20:52

## 2021-10-11 RX ADMIN — SOTALOL HYDROCHLORIDE SCH MG: 80 TABLET ORAL at 09:00

## 2021-10-11 RX ADMIN — DILTIAZEM HYDROCHLORIDE SCH MG: 60 TABLET, FILM COATED ORAL at 20:53

## 2021-10-11 RX ADMIN — CEFTRIAXONE SODIUM SCH MLS/HR: 1 INJECTION, POWDER, FOR SOLUTION INTRAMUSCULAR; INTRAVENOUS at 17:04

## 2021-10-11 RX ADMIN — PANTOPRAZOLE SODIUM SCH MG: 40 TABLET, DELAYED RELEASE ORAL at 09:00

## 2021-10-11 RX ADMIN — DILTIAZEM HYDROCHLORIDE SCH MG: 60 TABLET, FILM COATED ORAL at 15:53

## 2021-10-11 RX ADMIN — LISINOPRIL SCH MG: 20 TABLET ORAL at 09:00

## 2021-10-11 RX ADMIN — WARFARIN SODIUM SCH MG: 5 TABLET ORAL at 14:30

## 2021-10-11 NOTE — PRM.PN
Subjective


Subjective


Date:  Oct 11, 2021


Time:  09:15


Subjective


Patient resting in bed in no acute distress. Reports the "swelling in my leg is 

10 x better today". Denies any shortness of breath, chest pain or palpitation


Patient History:  


FH: esophageal cancer


  32 MOTHER, 


FH: suicide


  33 FATHER, , Age:30's - 40





VTE


VTE Risk Total Score:  1


VTE Risk Score


VTE Risk:


Score 0-1 = Low Risk


(Aggressive mobilization; early ambulation; no VTE prophylaxis required)


Score 2: Moderate Risk


(Intermittent/Pneumatic Compression Device OR Lovenox/Heparin/Coumadin)


Score 3-4: High Risk


(Intermittent/Pneumatic Compression Device AND Lovenox/Heparin/Coumadin)


Score  > or =5: Highest Risk


(Intermittent/Pneumatic Compression Device AND Lovenox/Heparin/Coumadin)


Antico:Hep/LMWH/Coum/Xarelto:  Yes





Review of Systems


Constitutional:  No: Fever, Chills, Sweats, Weakness


Eyes:  No: Pain, Vision change


ENT:  No: Ear pain, Ear discharge, Nose pain, Nose discharge, Mouth pain


Respiratory:  Shortness of breath, SOB with excertion


Cardiovascular:  Chest Pain, Palpitations, Other


Gastrointestinal:  No: Nausea, Vomiting, Abdominal Pain, Diarrhea, Constipation,

Melena, Hematochezia


Genitourinary:  No Dysuria, No Frequency, No Hematuria


Musculoskeletal:  other (Right leg swelling and pain)


Skin:  Other (right leg swelling)


Neurological:  No: Weakness, Numbness, Incoordination


Allergies:  


Coded Allergies:  


     No Known Allergies (Unverified , 16)


Scheduled


Apixaban (Eliquis), 5 MG PO BID


Diltiazem Hcl (Cardizem), 60 MG PO TID


Lisinopril (Lisinopril), 20 MG PO DAILY


Omeprazole Magnesium (Prilosec Otc), 1 TAB PO QD


Sotalol Hcl (Sotalol), 0.5 TAB PO BID





Objective


Vitals and I/O





Vital Sign - Last 24 Hours








 10/11/21 10/11/21 10/11/21 10/11/21





 08:27 09:00 09:00 11:38


 


Temp 97.8   98.1


 


Pulse 57 57  59


 


Resp 17   18


 


B/P (MAP) 132/88 (103) 132/88 132/88 131/88 (102)


 


Pulse Ox 98   97


 


    





 10/11/21 10/11/21 10/11/21 





 13:15 15:53 17:37 


 


Temp   98.2 


 


Pulse  59 58 


 


Resp   18 


 


B/P (MAP)  131/88 131/90 (104) 


 


Pulse Ox   98 


 


O2 Delivery Room Air   














Intake and Output 


 


 10/11/21





 07:00


 


Intake Total 240 ml


 


Balance 240 ml








General:  Alert, Oriented X3, No acute distress, moderate distress


HEENT:  Atraumatic, PERRLA


Lungs:  Other (Fair bilateral air entry)


Heart:  Other (Irregular irregular)


Abdomen:  Normal bowel sounds, Soft


Extremities:  No clubbing, Other (Right leg swollen, tender and warm)


Neuro:  Normal speech, Strength at 5/5 X4 ext, Sensation intact


Psych/Mental Status:  Mental status NL, Mood NL


All Results(Lab/Rad)





Laboratory Tests








Test


 10/7/21


17:05 10/7/21


21:37 10/8/21


02:29 10/8/21


07:18


 


Activated Partial


Thromboplast Time 25.9 SEC 


 27.0 SEC 


 29.2 SEC 


 26.7 SEC 





 


White Blood Count    6.4 10^3/uL 


 


Red Blood Count    5.05 10^6/uL 


 


Hemoglobin    17.7 g/dL 


 


Hematocrit    53.2 % 


 


Mean Corpuscular Volume    105.3 fL 


 


Mean Corpuscular Hemoglobin    35.0 pg 


 


Mean Corpuscular Hemoglobin


Concent 


 


 


 33.3 g/dL 





 


Red Cell Distribution Width    13.9 % 


 


Platelet Count    308 10^3/uL 


 


Mean Platelet Volume    8.4 fL 


 


Neutrophils (%) (Auto)    68.1 % 


 


Lymphocytes (%) (Auto)    20.2 % 


 


Monocytes (%) (Auto)    8.6 % 


 


Neutrophils # (Auto)    4.3 10^3/uL 


 


Lymphocytes # (Auto)    1.29 10^3/uL1 


 


Monocytes # (Auto)    0.6 10^3/uL 


 


Absolute Immature Granulocyte


(auto 


 


 


 0.02 10^3 u/L 





 


Absolute Eosinophils (auto)    0.1 10^3/uL 


 


Immature Granulocytes %    0.30 % 


 


Eosinophils %    1.7 % 


 


Basophils %    1.1 % 


 


Basophils #    0.1 10^3/uL 


 


Sodium Level    139 mmol/L 


 


Potassium Level    4.0 mmol/L 


 


Chloride Level    103.0 mmol/L 


 


Carbon Dioxide Level    30.2 mmol/L 


 


Anion Gap    9.8 


 


Blood Urea Nitrogen    4 mg/dL 


 


Creatinine    0.88 mg/dL 


 


Estimated GFR (


American) 


 


 


 116.1 





 


Est GFR (CKD-EPI)(Non-Afr


American) 


 


 


 95.9 





 


BUN/Creatinine Ratio    4.0 


 


Glucose Level    109 mg/dL 


 


Calcium Level    8.2 mg/dL 


 


Total Bilirubin    0.8 mg/dL 


 


Aspartate Amino Transf


(AST/SGOT) 


 


 


 30 U/L 





 


Alanine Aminotransferase


(ALT/SGPT) 


 


 


 19 U/L 





 


Alkaline Phosphatase    152 U/L 


 


Total Protein    6.5 g/dL 


 


Albumin    2.4 g/dL 


 


Globulin    4.1 


 


Albumin/Globulin Ratio    0.585 


 


Test


 10/8/21


12:17 


 


 





 


Activated Partial


Thromboplast Time 25.2 SEC 


 


 


 











Current Medications








 Medications


  (Trade)  Dose


 Ordered  Sig/Foreign


 Route


 PRN Reason  Start Time


 Stop Time Status Last Admin


Dose Admin


 


 Albuterol/


 Ipratropium


  (Duo 0.5-3(2.5)


 Mg/3 ml)  3 ml  STAT  STAT


 IH


   10/7/21 08:34


 10/7/21 08:35 DC 10/7/21 08:52





 


 Albuterol/


 Ipratropium


  (Duo 0.5-3(2.5)


 Mg/3 ml)  3 ml  STK-MED ONCE


 IH


   10/7/21 08:52


 10/7/21 08:52 DC  





 


 Heparin Sodium/


 Dextrose  500 ml @ 0


 mls/hr  TITRATE  STAT


 IV


   10/7/21 10:53


 10/7/21 10:58 DC 10/7/21 11:23





 


 Heparin Sodium/


 Dextrose  500 ml @ 0


 mls/hr  TITRATE


 IV


   10/7/21 11:00


 10/8/21 09:24 DC  





 


 Heparin Sodium


  (Porcine)


  (Heparin Sodium)  1,764 unit  OT  STAT


 IV


   10/7/21 10:58


 10/7/21 11:00 DC 10/7/21 11:24





 


 Diltiazem HCl


  (Cardizem)  25 mg  STAT  STAT


 IV


   10/7/21 11:00


 10/7/21 11:02 DC 10/7/21 11:24





 


 Heparin Sodium/


 Dextrose  500 ml @ ud  STK-MED ONCE


 IV


   10/7/21 11:09


 10/7/21 11:09 DC  





 


 Heparin Sodium


  (Porcine)


  (Heparin)  5,000 unit  STK-MED ONCE


 .ROUTE


   10/7/21 11:09


 10/7/21 11:09 DC  





 


 Diltiazem HCl


  (Cardizem)  50 mg  STK-MED ONCE


 IV


   10/7/21 11:15


 10/7/21 11:16 DC  





 


 Acetaminophen


  (Tylenol)  1,000 mg  Q6H  PRN


 PO


 PAIN 1 - 3  10/7/21 16:30


 21 16:29   





 


 Zolpidem Tartrate


  (Ambien)  5 mg  HS  PRN


 PO


 INSOMNIA  10/7/21 16:30


 21 16:29   





 


 Acetaminophen/


 Hydrocodone Bitart


  (Norco 5mg)  1 ea  Q4H  PRN


 PO


 PAIN 4 - 6  10/7/21 16:30


 21 16:29   





 


 Diltiazem HCl


  (Cardizem)  30 mg  Q6HR


 PO


   10/7/21 18:00


 10/7/21 16:36 DC  





 


 Diltiazem HCl


  (Cardizem)  60 mg  TID


 PO


   10/7/21 21:00


 21 20:59  10/8/21 09:41





 


 Lisinopril


  (Zestril)  20 mg  DAILY


 PO


   10/8/21 09:00


 21 08:59  10/8/21 09:41





 


 Sotalol HCl


  (Betapace)  40 mg  BID


 PO


   10/7/21 21:00


 21 20:59  10/8/21 09:40





 


 Pantoprazole


 Sodium


  (Protonix)  40 mg  DAILY


 PO


   10/8/21 09:00


 21 08:59  10/8/21 09:40





 


 Ceftriaxone


 Sodium 1000 mg/


 Sodium Chloride  100 ml @ 


 100 mls/hr  Q24HRS


 IV


   10/7/21 17:00


 21 16:59  10/7/21 18:52





 


 Sodium Chloride  100 ml @ ud  STK-MED ONCE


 IV


   10/7/21 18:35


 10/7/21 18:35 DC  





 


 Ceftriaxone Sodium


  (Rocephin)  1,000 mg  STK-MED ONCE


 .ROUTE


   10/7/21 18:35


 10/7/21 18:35 DC  





 


 Sodium Chloride  250 ml @ ud  STK-MED ONCE


 .ROUTE


   10/7/21 18:35


 10/7/21 18:36 DC  





 


 Enoxaparin Sodium


  (Lovenox)  100 mg  Q12HR


 SQ


   10/8/21 21:00


 21 20:59  10/8/21 13:41





 


 Enoxaparin Sodium


  (Lovenox)  100 mg  STK-MED ONCE


 SQ


   10/8/21 13:40


 10/8/21 13:40 DC  





 


 Warfarin Sodium


  (Coumadin)  7.5 mg  DAILY24


 PO


   10/8/21 14:30


 21 14:29 UNV  














Course


Sepsis Screening Results: Posi:  


NEGATIVE


Sepsis Qualifier/Stage:  NO DEFINITE RISK


DATE SEEN BY PHYSICIAN:  Oct 11, 2021


TIME SEEN BY PROVIDER:  09:15


Duration or Total Time Spent w:  20


Vitals & review Data





Vital Sign - Last 24 Hours








 10/10/21 10/10/21 10/10/21 10/10/21





 07:35 08:29 08:29 10:25


 


Temp 98.3   


 


Pulse 68 68  


 


Resp 15   


 


B/P (MAP) 119/77 (91) 119/77 119/77 


 


Pulse Ox 100   


 


O2 Delivery    Room Air


 


    





 10/10/21 10/10/21 10/10/21 





 10:55 15:50 16:40 


 


Temp 97.9  98.4 


 


Pulse 64 64 60 


 


Resp 16  16 


 


B/P (MAP) 118/75 (89) 118/75 122/76 (91) 


 


Pulse Ox 100  95 


 


O2 Delivery   Room Air 








Laboratory Tests








Test


 10/9/21


05:12 10/10/21


06:39


 


White Blood Count 6.1 10^3/uL  5.6 10^3/uL 


 


Red Blood Count 4.53 10^6/uL  4.49 10^6/uL 


 


Hemoglobin 16.1 g/dL  16.2 g/dL 


 


Hematocrit 47.9 %  48.6 % 


 


Mean Corpuscular Volume 105.7 fL  108.2 fL 


 


Mean Corpuscular Hemoglobin 35.5 pg  36.1 pg 


 


Mean Corpuscular Hemoglobin


Concent 33.6 g/dL 


 33.3 g/dL 





 


Red Cell Distribution Width 14.0 %  14.2 % 


 


Platelet Count 309 10^3/uL  283 10^3/uL 


 


Mean Platelet Volume 9.1 fL  9.2 fL 


 


Neutrophils (%) (Auto) 57.9 %  56.0 % 


 


Lymphocytes (%) (Auto) 30.0 %  30.2 % 


 


Monocytes (%) (Auto) 8.6 %  10.2 % 


 


Neutrophils # (Auto) 3.5 10^3/uL  3.1 10^3/uL 


 


Lymphocytes # (Auto) 1.84 10^3/uL1  1.69 10^3/uL1 


 


Monocytes # (Auto) 0.5 10^3/uL  0.6 10^3/uL 


 


Absolute Immature Granulocyte


(auto 0.03 10^3 u/L 


 0.04 10^3 u/L 





 


Absolute Eosinophils (auto) 0.2 10^3/uL  0.1 10^3/uL 


 


Immature Granulocytes % 0.50 %  0.70 % 


 


Eosinophils % 2.4 %  2.3 % 


 


Basophils % 1.1 %  1.3 % 


 


Basophils # 0.1 10^3/uL  0.1 10^3/uL 


 


Prothrombin Time 11.0 SEC  11.3 SEC 


 


Prothrombin Time INR


(Non-Therap) 1.0 


 1.1 





 


Sodium Level 138 mmol/L  141 mmol/L 


 


Potassium Level 3.7 mmol/L  3.7 mmol/L 


 


Chloride Level 103.0 mmol/L  106.0 mmol/L 


 


Carbon Dioxide Level 28.7 mmol/L  29.1 mmol/L 


 


Glucose Level 100 mg/dL  90 mg/dL 


 


Blood Urea Nitrogen 5 mg/dL  5 mg/dL 


 


Creatinine 0.83 mg/dL  1.04 mg/dL 


 


Calcium Level 8.5 mg/dL  8.5 mg/dL 


 


Anion Gap 10.0  9.6 


 


Estimated GFR (


American) 124.2 


 95.7 





 


Est GFR (CKD-EPI)(Non-Afr


American) 102.6 


 79.1 





 


BUN/Creatinine Ratio 6.0  4.0 








                               Current Medications








 Medications


  (Trade)  Dose


 Ordered  Sig/Foreign


 PRN Reason  Start Time


 Stop Time Status Last Admin


 


 Diltiazem HCl


  (Cardizem)  60 mg  TID


   10/7/21 21:00


 21 20:59  10/10/21 15:50





 


 Enoxaparin Sodium


  (Lovenox)  100 mg  Q12HR


   10/8/21 21:00


 21 20:59  10/10/21 08:29





 


 Lisinopril


  (Zestril)  20 mg  DAILY


   10/8/21 09:00


 21 08:59  10/10/21 08:29





 


 Nicotine


  (Nicotine 7mg


 Patch)  1 each  DAILY


   10/11/21 09:00


 11/10/21 08:59 UNV  





 


 Pantoprazole


 Sodium


  (Protonix)  40 mg  DAILY


   10/8/21 09:00


 21 08:59  10/10/21 08:29





 


 Sotalol HCl


  (Betapace)  40 mg  BID


   10/7/21 21:00


 21 20:59  10/10/21 08:29





 


 Warfarin Sodium


  (Coumadin)  7.5 mg  DAILY24


   10/8/21 14:30


 21 14:29  10/10/21 14:30











LEVEL 1 SEPSIS INFECTION CRITE:  Flu-Pneumonia


LEVEL 2-SIRS (LIST ALL THAT AP:  None/Not assessed


Cardiovascular Evidence:  Not Assessed or None


Hematologic Evidence:  None/Not assessed


Hepatic Evidence:  None/Not assessed


Metabolic Evidence:  None/Not assessed


Neurological Evidence:  None/Not assessed


Respiratory Evidence:  None/Not assessed


Renal Evidence:  None/Not assessed


O2 Sat by Pulse Oximetry:  98





Assessment/Plan


40-year-old male with history of atrial fibrillation, CAD, admitted for right 

leg swelling and chest pain, hemoptysis.  Work-up patient was found to have PE, 

DVT, A. fib with RVR.





ASSESSMENT


PE, DVT, A. fib with RVR.





PLAN


Started on warfarin, patient cannot afford to pay for mygola


On Rocephin 1g qd


Continue Lovenox, for now


continue Sotatol for arrhythmia


continue Lisinopril


Check INR daily (INR 1.1 on 10/10/21)


Continue current meds








Disposition: Plan patient to be discharged home once INR is therapeutic, patient

will be discharged on oral warfarin











ANGELINA WINSLOW NP           Oct 11, 2021 18:25

## 2021-10-12 VITALS — DIASTOLIC BLOOD PRESSURE: 85 MMHG | SYSTOLIC BLOOD PRESSURE: 151 MMHG

## 2021-10-12 VITALS — SYSTOLIC BLOOD PRESSURE: 137 MMHG | DIASTOLIC BLOOD PRESSURE: 93 MMHG

## 2021-10-12 VITALS — DIASTOLIC BLOOD PRESSURE: 96 MMHG | SYSTOLIC BLOOD PRESSURE: 152 MMHG

## 2021-10-12 VITALS — DIASTOLIC BLOOD PRESSURE: 85 MMHG | SYSTOLIC BLOOD PRESSURE: 132 MMHG

## 2021-10-12 VITALS — SYSTOLIC BLOOD PRESSURE: 135 MMHG | DIASTOLIC BLOOD PRESSURE: 86 MMHG

## 2021-10-12 VITALS — SYSTOLIC BLOOD PRESSURE: 151 MMHG | DIASTOLIC BLOOD PRESSURE: 85 MMHG

## 2021-10-12 LAB
BASOPHILS # BLD AUTO: 0.1 10^3/UL (ref 0–0.1)
BASOPHILS NFR BLD AUTO: 0.9 % (ref 0–0.2)
EOSINOPHIL # BLD AUTO: 0.1 10^3/UL (ref 0–0.2)
EOSINOPHIL NFR BLD AUTO: 2.2 % (ref 0–5)
ERYTHROCYTE [DISTWIDTH] IN BLOOD BY AUTOMATED COUNT: 13.9 % (ref 11.5–14.5)
LYMPHOCYTES # BLD AUTO: 2.09 10^3/UL1 (ref 1–4.8)
LYMPHOCYTES NFR BLD AUTO: 32.3 % (ref 24–44)
MCH RBC QN AUTO: 35.7 PG (ref 26–34)
MONOCYTES # BLD AUTO: 0.7 10^3/UL (ref 0.3–0.8)
MONOCYTES NFR BLD AUTO: 11 % (ref 5–12)
NEUTROPHILS # BLD AUTO: 3.5 10^3/UL (ref 1.8–7.7)
NEUTROPHILS NFR BLD AUTO: 53.6 % (ref 41–85)
PLATELET # BLD AUTO: 278 10^3/UL (ref 150–400)

## 2021-10-12 RX ADMIN — PANTOPRAZOLE SODIUM SCH MG: 40 TABLET, DELAYED RELEASE ORAL at 08:45

## 2021-10-12 RX ADMIN — DILTIAZEM HYDROCHLORIDE SCH MG: 60 TABLET, FILM COATED ORAL at 14:36

## 2021-10-12 RX ADMIN — SOTALOL HYDROCHLORIDE SCH MG: 80 TABLET ORAL at 08:45

## 2021-10-12 RX ADMIN — WARFARIN SODIUM SCH MG: 5 TABLET ORAL at 14:37

## 2021-10-12 RX ADMIN — DILTIAZEM HYDROCHLORIDE SCH MG: 60 TABLET, FILM COATED ORAL at 08:45

## 2021-10-12 RX ADMIN — LISINOPRIL SCH MG: 20 TABLET ORAL at 08:45

## 2021-10-12 RX ADMIN — ENOXAPARIN SODIUM SCH MG: 100 INJECTION SUBCUTANEOUS at 08:45

## 2021-10-12 RX ADMIN — Medication SCH EACH: at 08:45

## 2021-10-12 NOTE — NUR
DISCHARGE PLANNING

CM VISITED WITH PATIENT ABOUT DISCHARGE PLANS AND NEEDS.  PATIENT CURRENTLY LIVES HOME WITH 
HIS BROTHER AND IND WITH [facility] PRIOR TO HOSPITAL VISIT.  NO DME IN PLACE. PATIENT DOES NOT 
HAVE A PCP AND PCP LIST GIVEN.  PATIENT REPORTS EX WIFE JUST SET HIM UP WITH NP IN [place] 
- CHRIS ARAYA NP.  PATIENT ALSO GIVEN INFORMATION ON [facility] FOR RESOURCES.  PLAN TO 
DISCHARGE HOME TO SELF CARE AND STAY WITH HIS BROTHER AND FOLLOW UP WITH CHRIS ARAYA NP.

## 2021-10-12 NOTE — NUR
DISCHARGE



PT DISCHARGED AT THIS TIME IN STABLE CONDITION TO PRIVATE AUTO.  PT IS AMBULATORY.  PT 
PROVIDED TEACHING REGARDING F/U APPTS TO BE MADE WITH PCP FOR PT/INR LABS.  DISCHARGED ON 
WARFARIN 10 MG PO QD.  CALLED IN TO [id number].  LABS AND VITALS REVIEWED WITH 
PT.  IV DISCONTINUED.  PT TOLERATED WELL.  PRESSURE HELD FOR 1 MINUTE TO STOP BLEEDING.  
DRESSED WITH GAUZE AND COBAN.  PT STATES NO FURTHER QUESTIONS AT THIS TIME.  THIS NURSE 
RELINQUISHES CARE AT THIS TIME.

## 2021-10-12 NOTE — PRM.DC
Discharge Summary


Date of Discharge:  Oct 12, 2021


Time of Request to Discharge:  15:05


Reason for Visit:  Shortness of breath


Patient History:  


FH: esophageal cancer


  32 MOTHER, 


FH: suicide


  33 FATHER, , Age:30's - 40


General:  Alert, Oriented X3, Cooperative, No acute distress


HEENT:  Atraumatic, Mucous membr. moist/pink


Neck:  Supple, No thyromegaly


Lungs:  Clear to auscultation


Heart:  Normal S1, Normal S2, Other (non pitting edema +1 RLE, Pink in color)


Abdomen:  Normal bowel sounds, No tenderness


Extremities:  No clubbing, No cyanosis, Normal pulses, Other (Edema +1 RLE)


Skin:  No rashes, No breakdown


Neuro:  Normal gait, Normal speech, Normal tone


Psych/Mental Status:  Mental status NL, Mood NL


Scheduled


Apixaban (Eliquis), 5 MG PO BID


Diltiazem Hcl (Cardizem), 60 MG PO TID


Lisinopril (Lisinopril), 20 MG PO DAILY


Omeprazole Magnesium (Prilosec Otc), 1 TAB PO QD


Sotalol Hcl (Sotalol), 0.5 TAB PO BID


Warfarin Sodium (Warfarin Sodium), 10 MG PO DAILY24


Sepsis Evaluation @ Discharge





Vital Sign - Last 24 Hours








 10/10/21 10/10/21 10/10/21 10/10/21





 07:35 08:29 08:29 10:25


 


Temp 98.3   


 


Pulse 68 68  


 


Resp 15   


 


B/P (MAP) 119/77 (91) 119/77 119/77 


 


Pulse Ox 100   


 


O2 Delivery    Room Air


 


    





 10/10/21 10/10/21 10/10/21 





 10:55 15:50 16:40 


 


Temp 97.9  98.4 


 


Pulse 64 64 60 


 


Resp 16  16 


 


B/P (MAP) 118/75 (89) 118/75 122/76 (91) 


 


Pulse Ox 100  95 


 


O2 Delivery   Room Air 








Laboratory Tests








Test


 10/9/21


05:12 10/10/21


06:39


 


White Blood Count 6.1 10^3/uL  5.6 10^3/uL 


 


Red Blood Count 4.53 10^6/uL  4.49 10^6/uL 


 


Hemoglobin 16.1 g/dL  16.2 g/dL 


 


Hematocrit 47.9 %  48.6 % 


 


Mean Corpuscular Volume 105.7 fL  108.2 fL 


 


Mean Corpuscular Hemoglobin 35.5 pg  36.1 pg 


 


Mean Corpuscular Hemoglobin


Concent 33.6 g/dL 


 33.3 g/dL 





 


Red Cell Distribution Width 14.0 %  14.2 % 


 


Platelet Count 309 10^3/uL  283 10^3/uL 


 


Mean Platelet Volume 9.1 fL  9.2 fL 


 


Neutrophils (%) (Auto) 57.9 %  56.0 % 


 


Lymphocytes (%) (Auto) 30.0 %  30.2 % 


 


Monocytes (%) (Auto) 8.6 %  10.2 % 


 


Neutrophils # (Auto) 3.5 10^3/uL  3.1 10^3/uL 


 


Lymphocytes # (Auto) 1.84 10^3/uL1  1.69 10^3/uL1 


 


Monocytes # (Auto) 0.5 10^3/uL  0.6 10^3/uL 


 


Absolute Immature Granulocyte


(auto 0.03 10^3 u/L 


 0.04 10^3 u/L 





 


Absolute Eosinophils (auto) 0.2 10^3/uL  0.1 10^3/uL 


 


Immature Granulocytes % 0.50 %  0.70 % 


 


Eosinophils % 2.4 %  2.3 % 


 


Basophils % 1.1 %  1.3 % 


 


Basophils # 0.1 10^3/uL  0.1 10^3/uL 


 


Prothrombin Time 11.0 SEC  11.3 SEC 


 


Prothrombin Time INR


(Non-Therap) 1.0 


 1.1 





 


Sodium Level 138 mmol/L  141 mmol/L 


 


Potassium Level 3.7 mmol/L  3.7 mmol/L 


 


Chloride Level 103.0 mmol/L  106.0 mmol/L 


 


Carbon Dioxide Level 28.7 mmol/L  29.1 mmol/L 


 


Glucose Level 100 mg/dL  90 mg/dL 


 


Blood Urea Nitrogen 5 mg/dL  5 mg/dL 


 


Creatinine 0.83 mg/dL  1.04 mg/dL 


 


Calcium Level 8.5 mg/dL  8.5 mg/dL 


 


Anion Gap 10.0  9.6 


 


Estimated GFR (


American) 124.2 


 95.7 





 


Est GFR (CKD-EPI)(Non-Afr


American) 102.6 


 79.1 





 


BUN/Creatinine Ratio 6.0  4.0 








                               Current Medications








 Medications


  (Trade)  Dose


 Ordered  Sig/Foreign


 PRN Reason  Start Time


 Stop Time Status Last Admin


 


 Diltiazem HCl


  (Cardizem)  60 mg  TID


   10/7/21 21:00


 21 20:59  10/10/21 15:50





 


 Enoxaparin Sodium


  (Lovenox)  100 mg  Q12HR


   10/8/21 21:00


 21 20:59  10/10/21 08:29





 


 Lisinopril


  (Zestril)  20 mg  DAILY


   10/8/21 09:00


 21 08:59  10/10/21 08:29





 


 Nicotine


  (Nicotine 7mg


 Patch)  1 each  DAILY


   10/11/21 09:00


 11/10/21 08:59 UNV  





 


 Pantoprazole


 Sodium


  (Protonix)  40 mg  DAILY


   10/8/21 09:00


 21 08:59  10/10/21 08:29





 


 Sotalol HCl


  (Betapace)  40 mg  BID


   10/7/21 21:00


 21 20:59  10/10/21 08:29





 


 Warfarin Sodium


  (Coumadin)  7.5 mg  DAILY24


   10/8/21 14:30


 21 14:29  10/10/21 14:30














Stroke Discharge Summary


Discharge on Antcoagulation Th:  Yes





Course


Sepsis Screening Results: Posi:  


NEGATIVE


Sepsis Qualifier/Stage:  NO DEFINITE RISK


DATE SEEN BY PHYSICIAN:  Oct 12, 2021


TIME SEEN BY PROVIDER:  15:09


Duration or Total Time Spent w:  25


Vitals & review Data





Vital Sign - Last 24 Hours








 10/10/21 10/10/21 10/10/21 10/10/21





 07:35 08:29 08:29 10:25


 


Temp 98.3   


 


Pulse 68 68  


 


Resp 15   


 


B/P (MAP) 119/77 (91) 119/77 119/77 


 


Pulse Ox 100   


 


O2 Delivery    Room Air


 


    





 10/10/21 10/10/21 10/10/21 





 10:55 15:50 16:40 


 


Temp 97.9  98.4 


 


Pulse 64 64 60 


 


Resp 16  16 


 


B/P (MAP) 118/75 (89) 118/75 122/76 (91) 


 


Pulse Ox 100  95 


 


O2 Delivery   Room Air 








Laboratory Tests








Test


 10/9/21


05:12 10/10/21


06:39


 


White Blood Count 6.1 10^3/uL  5.6 10^3/uL 


 


Red Blood Count 4.53 10^6/uL  4.49 10^6/uL 


 


Hemoglobin 16.1 g/dL  16.2 g/dL 


 


Hematocrit 47.9 %  48.6 % 


 


Mean Corpuscular Volume 105.7 fL  108.2 fL 


 


Mean Corpuscular Hemoglobin 35.5 pg  36.1 pg 


 


Mean Corpuscular Hemoglobin


Concent 33.6 g/dL 


 33.3 g/dL 





 


Red Cell Distribution Width 14.0 %  14.2 % 


 


Platelet Count 309 10^3/uL  283 10^3/uL 


 


Mean Platelet Volume 9.1 fL  9.2 fL 


 


Neutrophils (%) (Auto) 57.9 %  56.0 % 


 


Lymphocytes (%) (Auto) 30.0 %  30.2 % 


 


Monocytes (%) (Auto) 8.6 %  10.2 % 


 


Neutrophils # (Auto) 3.5 10^3/uL  3.1 10^3/uL 


 


Lymphocytes # (Auto) 1.84 10^3/uL1  1.69 10^3/uL1 


 


Monocytes # (Auto) 0.5 10^3/uL  0.6 10^3/uL 


 


Absolute Immature Granulocyte


(auto 0.03 10^3 u/L 


 0.04 10^3 u/L 





 


Absolute Eosinophils (auto) 0.2 10^3/uL  0.1 10^3/uL 


 


Immature Granulocytes % 0.50 %  0.70 % 


 


Eosinophils % 2.4 %  2.3 % 


 


Basophils % 1.1 %  1.3 % 


 


Basophils # 0.1 10^3/uL  0.1 10^3/uL 


 


Prothrombin Time 11.0 SEC  11.3 SEC 


 


Prothrombin Time INR


(Non-Therap) 1.0 


 1.1 





 


Sodium Level 138 mmol/L  141 mmol/L 


 


Potassium Level 3.7 mmol/L  3.7 mmol/L 


 


Chloride Level 103.0 mmol/L  106.0 mmol/L 


 


Carbon Dioxide Level 28.7 mmol/L  29.1 mmol/L 


 


Glucose Level 100 mg/dL  90 mg/dL 


 


Blood Urea Nitrogen 5 mg/dL  5 mg/dL 


 


Creatinine 0.83 mg/dL  1.04 mg/dL 


 


Calcium Level 8.5 mg/dL  8.5 mg/dL 


 


Anion Gap 10.0  9.6 


 


Estimated GFR (


American) 124.2 


 95.7 





 


Est GFR (CKD-EPI)(Non-Afr


American) 102.6 


 79.1 





 


BUN/Creatinine Ratio 6.0  4.0 








                               Current Medications








 Medications


  (Trade)  Dose


 Ordered  Sig/Foreign


 PRN Reason  Start Time


 Stop Time Status Last Admin


 


 Diltiazem HCl


  (Cardizem)  60 mg  TID


   10/7/21 21:00


 21 20:59  10/10/21 15:50





 


 Enoxaparin Sodium


  (Lovenox)  100 mg  Q12HR


   10/8/21 21:00


 21 20:59  10/10/21 08:29





 


 Lisinopril


  (Zestril)  20 mg  DAILY


   10/8/21 09:00


 21 08:59  10/10/21 08:29





 


 Nicotine


  (Nicotine 7mg


 Patch)  1 each  DAILY


   10/11/21 09:00


 11/10/21 08:59 UNV  





 


 Pantoprazole


 Sodium


  (Protonix)  40 mg  DAILY


   10/8/21 09:00


 21 08:59  10/10/21 08:29





 


 Sotalol HCl


  (Betapace)  40 mg  BID


   10/7/21 21:00


 21 20:59  10/10/21 08:29





 


 Warfarin Sodium


  (Coumadin)  7.5 mg  DAILY24


   10/8/21 14:30


 21 14:29  10/10/21 14:30











LEVEL 1 SEPSIS INFECTION CRITE:  Flu-Pneumonia


LEVEL 2-SIRS (LIST ALL THAT AP:  None/Not assessed


Cardiovascular Evidence:  Not Assessed or None


Hematologic Evidence:  None/Not assessed


Hepatic Evidence:  None/Not assessed


Metabolic Evidence:  None/Not assessed


Neurological Evidence:  None/Not assessed


Respiratory Evidence:  None/Not assessed


Renal Evidence:  None/Not assessed


O2 Sat by Pulse Oximetry:  98





Plan


Discharge Disposition:  Stable











NAYLA,PRUDENCE C NP           Oct 12, 2021 15:10

## 2022-06-02 ENCOUNTER — HOSPITAL ENCOUNTER (INPATIENT)
Dept: HOSPITAL 65 - ER | Age: 42
LOS: 3 days | Discharge: HOME | DRG: 291 | End: 2022-06-05
Attending: INTERNAL MEDICINE | Admitting: INTERNAL MEDICINE
Payer: SELF-PAY

## 2022-06-02 VITALS — DIASTOLIC BLOOD PRESSURE: 96 MMHG | SYSTOLIC BLOOD PRESSURE: 134 MMHG

## 2022-06-02 VITALS — SYSTOLIC BLOOD PRESSURE: 111 MMHG | DIASTOLIC BLOOD PRESSURE: 75 MMHG

## 2022-06-02 VITALS — DIASTOLIC BLOOD PRESSURE: 95 MMHG | SYSTOLIC BLOOD PRESSURE: 121 MMHG

## 2022-06-02 VITALS — DIASTOLIC BLOOD PRESSURE: 64 MMHG | SYSTOLIC BLOOD PRESSURE: 104 MMHG

## 2022-06-02 VITALS — SYSTOLIC BLOOD PRESSURE: 101 MMHG | DIASTOLIC BLOOD PRESSURE: 75 MMHG

## 2022-06-02 VITALS — DIASTOLIC BLOOD PRESSURE: 90 MMHG | SYSTOLIC BLOOD PRESSURE: 119 MMHG

## 2022-06-02 VITALS — DIASTOLIC BLOOD PRESSURE: 81 MMHG | SYSTOLIC BLOOD PRESSURE: 114 MMHG

## 2022-06-02 VITALS — DIASTOLIC BLOOD PRESSURE: 74 MMHG | SYSTOLIC BLOOD PRESSURE: 114 MMHG

## 2022-06-02 VITALS — SYSTOLIC BLOOD PRESSURE: 91 MMHG | DIASTOLIC BLOOD PRESSURE: 47 MMHG

## 2022-06-02 VITALS — SYSTOLIC BLOOD PRESSURE: 111 MMHG | DIASTOLIC BLOOD PRESSURE: 85 MMHG

## 2022-06-02 VITALS — DIASTOLIC BLOOD PRESSURE: 92 MMHG | SYSTOLIC BLOOD PRESSURE: 136 MMHG

## 2022-06-02 VITALS — SYSTOLIC BLOOD PRESSURE: 110 MMHG | DIASTOLIC BLOOD PRESSURE: 82 MMHG

## 2022-06-02 VITALS — SYSTOLIC BLOOD PRESSURE: 101 MMHG | DIASTOLIC BLOOD PRESSURE: 83 MMHG

## 2022-06-02 VITALS — DIASTOLIC BLOOD PRESSURE: 75 MMHG | SYSTOLIC BLOOD PRESSURE: 113 MMHG

## 2022-06-02 VITALS — DIASTOLIC BLOOD PRESSURE: 64 MMHG | SYSTOLIC BLOOD PRESSURE: 136 MMHG

## 2022-06-02 VITALS — DIASTOLIC BLOOD PRESSURE: 77 MMHG | SYSTOLIC BLOOD PRESSURE: 120 MMHG

## 2022-06-02 VITALS — SYSTOLIC BLOOD PRESSURE: 93 MMHG | DIASTOLIC BLOOD PRESSURE: 45 MMHG

## 2022-06-02 VITALS — DIASTOLIC BLOOD PRESSURE: 73 MMHG | SYSTOLIC BLOOD PRESSURE: 134 MMHG

## 2022-06-02 VITALS
BODY MASS INDEX: 41.5 KG/M2 | DIASTOLIC BLOOD PRESSURE: 72 MMHG | SYSTOLIC BLOOD PRESSURE: 185 MMHG | WEIGHT: 280.19 LBS | HEIGHT: 69 IN

## 2022-06-02 VITALS — DIASTOLIC BLOOD PRESSURE: 56 MMHG | SYSTOLIC BLOOD PRESSURE: 112 MMHG

## 2022-06-02 VITALS — DIASTOLIC BLOOD PRESSURE: 109 MMHG | SYSTOLIC BLOOD PRESSURE: 136 MMHG

## 2022-06-02 VITALS — SYSTOLIC BLOOD PRESSURE: 105 MMHG | DIASTOLIC BLOOD PRESSURE: 64 MMHG

## 2022-06-02 VITALS — SYSTOLIC BLOOD PRESSURE: 112 MMHG | DIASTOLIC BLOOD PRESSURE: 71 MMHG

## 2022-06-02 VITALS — SYSTOLIC BLOOD PRESSURE: 107 MMHG | DIASTOLIC BLOOD PRESSURE: 84 MMHG

## 2022-06-02 VITALS — DIASTOLIC BLOOD PRESSURE: 91 MMHG | SYSTOLIC BLOOD PRESSURE: 128 MMHG

## 2022-06-02 VITALS — SYSTOLIC BLOOD PRESSURE: 106 MMHG | DIASTOLIC BLOOD PRESSURE: 87 MMHG

## 2022-06-02 VITALS — SYSTOLIC BLOOD PRESSURE: 132 MMHG | DIASTOLIC BLOOD PRESSURE: 106 MMHG

## 2022-06-02 VITALS — SYSTOLIC BLOOD PRESSURE: 98 MMHG | DIASTOLIC BLOOD PRESSURE: 52 MMHG

## 2022-06-02 VITALS — SYSTOLIC BLOOD PRESSURE: 102 MMHG | DIASTOLIC BLOOD PRESSURE: 84 MMHG

## 2022-06-02 VITALS — SYSTOLIC BLOOD PRESSURE: 109 MMHG | DIASTOLIC BLOOD PRESSURE: 87 MMHG

## 2022-06-02 VITALS — SYSTOLIC BLOOD PRESSURE: 105 MMHG | DIASTOLIC BLOOD PRESSURE: 83 MMHG

## 2022-06-02 VITALS — SYSTOLIC BLOOD PRESSURE: 142 MMHG | DIASTOLIC BLOOD PRESSURE: 90 MMHG

## 2022-06-02 VITALS — DIASTOLIC BLOOD PRESSURE: 98 MMHG | SYSTOLIC BLOOD PRESSURE: 120 MMHG

## 2022-06-02 VITALS — DIASTOLIC BLOOD PRESSURE: 98 MMHG | SYSTOLIC BLOOD PRESSURE: 131 MMHG

## 2022-06-02 VITALS — DIASTOLIC BLOOD PRESSURE: 82 MMHG | SYSTOLIC BLOOD PRESSURE: 101 MMHG

## 2022-06-02 VITALS — DIASTOLIC BLOOD PRESSURE: 67 MMHG | SYSTOLIC BLOOD PRESSURE: 113 MMHG

## 2022-06-02 VITALS — SYSTOLIC BLOOD PRESSURE: 113 MMHG | DIASTOLIC BLOOD PRESSURE: 72 MMHG

## 2022-06-02 VITALS — DIASTOLIC BLOOD PRESSURE: 65 MMHG | SYSTOLIC BLOOD PRESSURE: 97 MMHG

## 2022-06-02 VITALS — SYSTOLIC BLOOD PRESSURE: 111 MMHG | DIASTOLIC BLOOD PRESSURE: 70 MMHG

## 2022-06-02 VITALS — DIASTOLIC BLOOD PRESSURE: 88 MMHG | SYSTOLIC BLOOD PRESSURE: 109 MMHG

## 2022-06-02 VITALS — DIASTOLIC BLOOD PRESSURE: 71 MMHG | SYSTOLIC BLOOD PRESSURE: 86 MMHG

## 2022-06-02 DIAGNOSIS — E66.01: ICD-10-CM

## 2022-06-02 DIAGNOSIS — I25.10: ICD-10-CM

## 2022-06-02 DIAGNOSIS — I50.43: ICD-10-CM

## 2022-06-02 DIAGNOSIS — Z82.49: ICD-10-CM

## 2022-06-02 DIAGNOSIS — E78.5: ICD-10-CM

## 2022-06-02 DIAGNOSIS — I48.91: ICD-10-CM

## 2022-06-02 DIAGNOSIS — F32.A: ICD-10-CM

## 2022-06-02 DIAGNOSIS — N17.9: ICD-10-CM

## 2022-06-02 DIAGNOSIS — I48.20: ICD-10-CM

## 2022-06-02 DIAGNOSIS — G47.33: ICD-10-CM

## 2022-06-02 DIAGNOSIS — K21.9: ICD-10-CM

## 2022-06-02 DIAGNOSIS — F41.9: ICD-10-CM

## 2022-06-02 DIAGNOSIS — I42.0: ICD-10-CM

## 2022-06-02 DIAGNOSIS — Z91.14: ICD-10-CM

## 2022-06-02 DIAGNOSIS — Z79.899: ICD-10-CM

## 2022-06-02 DIAGNOSIS — Z79.01: ICD-10-CM

## 2022-06-02 DIAGNOSIS — F17.210: ICD-10-CM

## 2022-06-02 DIAGNOSIS — I11.0: Primary | ICD-10-CM

## 2022-06-02 LAB
BASOPHILS # BLD AUTO: 0.1 10^3/UL (ref 0–0.1)
BASOPHILS NFR BLD AUTO: 0.6 % (ref 0–0.2)
CO2 BLDA-SCNC: 25.4 MMOL/L (ref 20–32)
EOSINOPHIL # BLD AUTO: 0.1 10^3/UL (ref 0–0.2)
EOSINOPHIL NFR BLD AUTO: 1.5 % (ref 0–5)
ERYTHROCYTE [DISTWIDTH] IN BLOOD BY AUTOMATED COUNT: 15 % (ref 11.5–14.5)
GLUCOSE PRE 100 G GLC PO SERPL-MCNC: 115 MG/DL (ref 70–110)
LYMPHOCYTES # BLD AUTO: 1.97 10^3/UL1 (ref 1–4.8)
LYMPHOCYTES NFR BLD AUTO: 24.6 % (ref 24–44)
MCH RBC QN AUTO: 33.4 PG (ref 26–34)
MONOCYTES # BLD AUTO: 0.7 10^3/UL (ref 0.3–0.8)
MONOCYTES NFR BLD AUTO: 8.4 % (ref 5–12)
NEUTROPHILS # BLD AUTO: 5.2 10^3/UL (ref 1.8–7.7)
NEUTROPHILS NFR BLD AUTO: 64.8 % (ref 41–85)
PLATELET # BLD AUTO: 176 10^3/UL (ref 150–400)

## 2022-06-02 PROCEDURE — 85730 THROMBOPLASTIN TIME PARTIAL: CPT

## 2022-06-02 PROCEDURE — 85379 FIBRIN DEGRADATION QUANT: CPT

## 2022-06-02 PROCEDURE — 71045 X-RAY EXAM CHEST 1 VIEW: CPT

## 2022-06-02 PROCEDURE — 85610 PROTHROMBIN TIME: CPT

## 2022-06-02 PROCEDURE — 85025 COMPLETE CBC W/AUTO DIFF WBC: CPT

## 2022-06-02 PROCEDURE — 83735 ASSAY OF MAGNESIUM: CPT

## 2022-06-02 PROCEDURE — 80053 COMPREHEN METABOLIC PANEL: CPT

## 2022-06-02 PROCEDURE — 93005 ELECTROCARDIOGRAM TRACING: CPT

## 2022-06-02 PROCEDURE — 84484 ASSAY OF TROPONIN QUANT: CPT

## 2022-06-02 PROCEDURE — 94640 AIRWAY INHALATION TREATMENT: CPT

## 2022-06-02 PROCEDURE — 80307 DRUG TEST PRSMV CHEM ANLYZR: CPT

## 2022-06-02 PROCEDURE — 83880 ASSAY OF NATRIURETIC PEPTIDE: CPT

## 2022-06-02 PROCEDURE — 87426 SARSCOV CORONAVIRUS AG IA: CPT

## 2022-06-02 PROCEDURE — 84443 ASSAY THYROID STIM HORMONE: CPT

## 2022-06-02 PROCEDURE — 80048 BASIC METABOLIC PNL TOTAL CA: CPT

## 2022-06-02 PROCEDURE — 36415 COLL VENOUS BLD VENIPUNCTURE: CPT

## 2022-06-02 PROCEDURE — 99291 CRITICAL CARE FIRST HOUR: CPT

## 2022-06-02 RX ADMIN — APIXABAN SCH MG: 5 TABLET, FILM COATED ORAL at 20:29

## 2022-06-02 RX ADMIN — AMIODARONE HYDROCHLORIDE SCH MLS/HR: 1.8 INJECTION, SOLUTION INTRAVENOUS at 21:19

## 2022-06-02 RX ADMIN — AMIODARONE HYDROCHLORIDE SCH MLS/HR: 1.8 INJECTION, SOLUTION INTRAVENOUS at 11:48

## 2022-06-02 NOTE — NUR
VOMITING:

PT IS VOMITING. MD NOTIFIED.

TELEPHONE ORDER PER DR SOLO FOR ZOFRAN 4MG IV Q6HR PRN.

TELEPHONE ORDER RBAV.

## 2022-06-02 NOTE — NUR
ARRIVAL

PATIENT ARRIVED TO ED4 AMBULATORY, C/O SHORTNESS OF BREATH AND BILATERAL LEG 
SWELLING, PATIENT STATES HE HAS BEEN OUT OF HIS MEDICATIONS AND LIVING IN HIS 
CAR FOR THE PAST SEVERAL WEEKS, TODAY THE SWELLING SHORTNESS OF BREATH 
CONTINUES TO GET WORSE, DECIDED TO COME TO THE ED FOR EVAL, VITAL SIGNS TAKEN 
AND DOCTOR ALONZO NOTIFIED OF PATIENT'S ARRIVAL.

## 2022-06-02 NOTE — NUR
ORDER CHANGES

TELEPHONE ORDERS RECEIVED FROM DR. MCGRATH TO DECREASE AMIODARONE TO 0.5 MG/MIN AND BEGIN 
SOTOLOL 80MG PO NOW AND BID.  DISCONTINUE AMIODARONE WITHIN 1-2 HOURS AFTER DECREASING RATE 
AND GIVING SOTOLOL.  MAINTAIN HEART RATE UNDER 120.

1415 - AMIODARONE RATE DECREASED TO 17ML/HR

## 2022-06-02 NOTE — NUR
AMIODARONE

DR. MCGRATH AT BEDSIDE.  ORDERS RECEIVED TO CONTINUE AMIODARONE @ 0.5MG INFUSION UNTIL HEART 
RATE IS SUSTAINED LESS THAN 120/MIN

## 2022-06-02 NOTE — PCM.EKG
[facility]

                                       

Test Date:    2022               Test Time:    10:43:18

Pat Name:     SAMUEL COLLINS            Department:   

Patient ID:   [id number]          Room:          

Gender:       M                        Technician:   [id number]

:          1980               Requested By: LAMONT ROSADO

Order Number: [id number]           Reading MD:     

                                 Measurements

Intervals                              Axis          

Rate:         165                      P:            

MD:                                    QRS:          103

QRSD:         87                       T:            -77

QT:           277                                    

QTc:          459                                    

                           Interpretive Statements

Atrial fibrillation

Right axis deviation

Borderline repolarization abnormality

Compared to ECG 10/07/2021 10:12:30

Right-axis deviation now present

ST (T wave) deviation no longer present



Please click the below link to view image of tracing.

## 2022-06-02 NOTE — NUR
ARRIVAL

REPORT RECEIVED FROM ERNST AMIN.  TRANSFERRED FROM ER TO ICU ROOM 2 VIA WHEEL CHAIR.  
AMBULATED FROM WHEEL CHAIR TO BED WITHOUT ASSISTANCE AND WITHOUT DIFFICULTY.  CONNECTED TO 
BEDSIDE MONITORING. CONTINUES TO BE IN A-FIB WITH RATE IN 'S.  ORIENTED TO ROOM AND 
DISCUSSED PLAN OF CARE.  DENIES NEEDS OR CONCERNS.  NO S/S OF ACUTE DISTRESS NOTED.

## 2022-06-02 NOTE — DIREP
PROCEDURE:CHEST 1 VIEW

 

COMPARISON:[facility], CT, CTA CHEST, 10/07/2021, 10:25 AM.  

[facility], CR, XRAY CHEST SINGLE VW, 07/30/2021, 09:39 AM.

 

INDICATIONS:sob

 

FINDINGS:

LUNGS/PLEURA:No significant pulmonary parenchymal abnormalities. No effusions.

VASCULATURE:Normal.  Unremarkable pulmonary vasculature.

CARDIAC:Moderate cardiomegaly.  

MEDIASTINUM:Normal.  No visible mass or adenopathy. 

BONES:Normal.  No fracture or visible bony lesion. 

OTHER:Negative.  

 

CONCLUSION:Cardiomegaly.  Otherwise no acute cardiopulmonary abnormalities.

 

 

Dictated by: En Lora M.D. on 06/02/2022 at 10:54 AM     

Electronically Signed By: En Lora M.D. on 06/02/2022 at 10:56 AM

## 2022-06-02 NOTE — PCM.HP
History of Present Illness


Reason for Visit:  Shortness of breath and edema


History of Present Illness


41-year-old male with history of atrial fibrillation comes here with several 

weeks of worsening leg edema as well as progressive shortness of breath.  The 

edema is in both legs.  The shortness of breath is progressive and worse with 

activity and laying down.  He is got history of A. fib but unfortunately not 

taking any medication right now.  No fever and no cough.  No chest pain.  The 

patient has seen one of the local cardiologist in the past.  Shortness of breath

described as 8 out of 10 in intensity.





And work-up in the emergency room patient was found to be in atrial fibrillation

with RVR.  Patient was also found to have significant leg edema and, short of 

breath, orthopneic.Denies chest pain, nausea vomiting fever or chills.Initially 

patient was given diltiazem, remained in A. fib with RVR.  Patient was started 

on IV amiodarone drip.  Given IV Lasix.  Placed on supplemental oxygen.  Patient

admitted to intensive care unit for further management.Cardiologist consulted.


Past Medical History


Cardiac:  AFIB, CAD, HTN


Past Surgical History:  No pertinent hx





Past Social History


Smoke:  1 pack per day


Alcohol:  social





Travel Hx


EBOLA RISK:Travel to/contact w:  No





Review of Systems


Respiratory:  Shortness of breath, SOB with excertion, Other (Edema)


Cardiovascular:  Chest Pain, Palpitations, Other


Gastrointestinal:  Other (Abdominal distention)


Musculoskeletal:  other (Edema)


Skin:  Other


Other


Review of other 14 systems negative except was mentioned above.


Allergies:  


Coded Allergies:  


     No Known Allergies (Unverified , 16)


Scheduled


Diltiazem Hcl (Cardizem), 60 MG PO TID


Lisinopril (Lisinopril), 20 MG PO DAILY


Omeprazole Magnesium (Prilosec Otc), 1 TAB PO QD


Sotalol Hcl (Sotalol), 0.5 TAB PO BID


Warfarin Sodium (Warfarin Sodium), 10 MG PO DAILY24





VTE


VTE Risk Total Score:  1


VTE Risk Score


VTE Risk:


Score 0-1 = Low Risk


(Aggressive mobilization; early ambulation; no VTE prophylaxis required)


Score 2: Moderate Risk


(Intermittent/Pneumatic Compression Device OR Lovenox/Heparin/Coumadin)


Score 3-4: High Risk


(Intermittent/Pneumatic Compression Device AND Lovenox/Heparin/Coumadin)


Score  > or =5: Highest Risk


(Intermittent/Pneumatic Compression Device AND Lovenox/Heparin/Coumadin)


Antico:Hep/LMWH/Coum/Xarelto:  Yes





VTE


VTE Present on Admission:  Yes


Currently receiving anticoagul:  Yes


VTE Risk Total Score:  1


Antico:Hep/LMWH/Coum/Xarelto:  Yes





Exam


Vital Signs





Vital Signs








  Date Time  Temp Pulse Resp B/P (MAP) Pulse Ox O2 Delivery O2 Flow Rate FiO2


 


22 13:32  125 15 134/73 (93) 99 Room Air* 0 21


 


22 12:49 97.6       








General Appearance:  Alert, Oriented X3, moderate distress, Other (Orthopneic)


HEENT:  Atraumatic, PERRLA


Respiratory:  Other (Bibasilar crackles, respirations labored)


Cardiovascular:  Other (Irregular irregular tachycardic)


Abdominal:  No tenderness, Other (Distended)


Extremities:  No clubbing, No cyanosis, Other (Bilateral edema)


Skin:  No lesions


Neuro:  Normal speech, Normal tone


Psych/Mental Status:  Mental status NL, Mood NL





Assessment/Plan


41-year-old male with history of atrial fibrillation comes here with several we

eks of worsening leg edema as well as progressive shortness of breath.  The 

edema is in both legs.  The shortness of breath is progressive and worse with 

activity and laying down.  He is got history of A. fib but unfortunately not 

taking any medication right now.  No fever and no cough.  No chest pain.  The 

patient has seen one of the local cardiologist in the past.  Shortness of breath

described as 8 out of 10 in intensity.





And work-up in the emergency room patient was found to be in atrial fibrillation

with RVR.  Patient was also found to have significant leg edema and, short of 

breath, orthopneic.Denies chest pain, nausea vomiting fever or chills.Initially 

patient was given diltiazem, remained in A. fib with RVR.  Patient was started 

on IV amiodarone drip.  Given IV Lasix.  Placed on supplemental oxygen.  Patient

admitted to intensive care unit for further management.Cardiologist consulted.





Plan


AdmitTo ICU


Telemetry monitoring


Continue with IV amiodarone


IV diuresis


Monitor kidney function urine output


Monitor and correct electrolytes


Anticoagulation


Cardiologist consulted for evaluation further commendations


Venous Doppler of lower extremities


GI prophylaxisSpine DVT prophylaxis low molecular weight


Expect length of stay more than 1 midnight





Patient presenting with potentially life-threatening condition.Critical care 

time spent examining the patient, reviewing chart, history, images, labs, 

discussing the case with ICU RN and documentation 71 minutes.  Case discussed 

with patient and family.


Problems:  


(1) Atrial fibrillation with rapid ventricular response


ICD Code:  I48.91 - Unspecified atrial fibrillation


SNOMED:  171708129129982


(2) Acute exacerbation of congestive heart failure


ICD Code:  I50.9 - Heart failure, unspecified


SNOMED:  386198245, [id number]


(3) Noncompliance with medications


ICD Code:  Z91.14 - Patient's other noncompliance with medication regimen


SNOMED:  874792985


(4) CAD (coronary artery disease)


ICD Code:  I25.10 - Atherosclerotic heart disease of native coronary artery 

without angina pectoris


SNOMED:  99814905


Patient History:  


FH: esophageal cancer


  32 MOTHER, 


FH: suicide


  33 FATHER, , Age:30's - 40











HERSON JAMES MD        2022 13:56

## 2022-06-02 NOTE — NUR
TELEPHONE ORDER FOR FLUID RESTRICTION PER DR MCGRATH - 1500ML PER 24HRS.

PER MD TO D/C AMIODARONE GTT WHEN HEART RATE IS CONSISTENTLY BELOW 120.

TELEPHONE ORDER RBAV.

## 2022-06-02 NOTE — NUR
DISCHARGE PLANNING



CM VISITED WITH PATIENT ABOUT DISCHARGE PLANS AND NEEDS.  PATIENT CURRENTLY LIVES IN HIS 
TRUCK AND IS IND OF ADL'S. HE SAID HE DOES ODDS AND END JOBS TO MAKE A LIL MONEY. HE HAS NO 
DME IN PLACE. PATIENT ORDERED A LIFE VEST IN A PRIOR VISIT AND HE STATED HE TURNED IT BACK 
IN AND HE DID NOT NEED IT. PATIENT DOES NOT HAVE A PCP, SO PROVIDER LIST GIVEN.  PATIENT 
STATED HE HAS APPLIED FOR DISABILITY. PATIENT WAS GIVEN A PACKET WITH INFORMATION ON [facility], [employer] AND [employer] $4.00 LIST. NO FURTHER NEEDS NOTED. DISCHARGE PLAN 
IS FOR PATIENT TO D/C BACK TO LIVING IN HIS TRUCK. CM WILL CONTINUE TO MONITOR FOR ANY 
ADDITIONAL NEEDS OR RESOURCE INFORMATION.

## 2022-06-02 NOTE — ER.PDOC
General


Chief Complaint:  Dyspnea/Respdistress


Stated Complaint:  SOB/SWELLING


TRAVEL OUT OF US:  No


Time seen by MD:  10:30


Source:  patient


Exam Limitations:  no limitations





History of Present Illness


Initial Comments


41-year-old male with history of atrial fibrillation comes here with several 

weeks of worsening leg edema as well as progressive shortness of breath.  The 

edema is in both legs.  The shortness of breath is progressive and worse with 

activity and laying down.  He is got history of A. fib but unfortunately not 

taking any medication right now.  No fever and no cough.  No chest pain.  The 

patient has seen one of the local cardiologist in the past.  Shortness of breath

described as 8 out of 10 in intensity.


Allergies:  


Coded Allergies:  


     No Known Allergies (Unverified , 12/17/16)


Home Meds


Active Scripts


Warfarin Sodium (WARFARIN SODIUM) 10 Mg Tablet, 10 MG PO DAILY24 for DVT, PE for

14 Days, #14 TAB


   Prov:NAYLA,PRUDENCE C NP         10/12/21


Diltiazem Hcl (CARDIZEM) 60 Mg Tablet, 60 MG PO TID, #90 TAB 6 Refills


   Prov:HERSON JAMES MD         7/31/21


Omeprazole Magnesium (PRILOSEC OTC) 20 Mg Tablet.dr, 1 TAB PO QD for 30 Days, 

#30 TAB 0 Refills


   Prov:HERSON JAMES MD         7/31/21


Sotalol Hcl (SOTALOL) 80 Mg Tablet, 0.5 TAB PO BID, #30 TAB 3 Refills


   Prov:HERSON JAMES MD         7/31/21


Lisinopril (LISINOPRIL) 20 Mg Tablet, 20 MG PO DAILY for 30 Days, #30 TAB


   Prov:HERSON JAMES MD         7/31/21





Past Medical History


Medical History:  cardiac problems, hypertension, other (A. fib, obesity)


Surgical History:  cardiac cath


LMP (females 10-50):  N/A Not applicalbe





Family History


Significant Family History:  no pertinent family hx





Social History


Smoking:  non-smoker


Alcohol Use:  none


Drug Use:  marijuana





Reviewed


Nursing Reviewed:  Vital Signs, Abn. Noted, Nursing Assessment





Review of Systems


Constitutional:  denies no symptoms reported, denies see HPI, denies chills, 

denies diaphoresis, denies fever, denies malaise, denies weakness, denies other


EENTM:  denies no symptoms reported, denies see HPI, denies eye pain, denies 

blurred vision, denies tearing, denies double vision, denies ear pain, denies 

ear discharge, denies nose pain, denies nose congestion, denies throat pain, 

denies throat swelling, denies mouth pain, denies mouth swelling, denies other


Respiratory:  denies no symptoms reported, denies see HPI, denies cough; 

orthopnea, shortness of breath; denies stridor; wheezing; denies other


Cardiovascular:  denies no symptoms reported, denies see HPI, denies chest pain,

denies edema, denies palpitations, denies syncope, denies other


Gastrointestinal:  denies no symptoms reported, denies see HPI, denies abdominal

pain, denies constipation, denies diarrhea, denies nausea, denies vomiting, 

denies other


Genitourinary:  denies no symptoms reported, denies see HPI, denies discharge, 

denies dysuria, denies frequency, denies hematuria, denies pain, denies other


Musculoskeletal:  denies no symptoms reported, denies see HPI, denies back pain,

denies gout, denies joint pain, denies joint swelling, denies muscle pain, 

denies muscle stiffness, denies neck pain, denies other


Skin:  denies no symptoms reported, denies see HPI, denies change in color, 

denies change in hair/nails, denies dryness, denies lesions, denies lumps, 

denies rash, denies other


Psychiatric/Neurological:  denies no symptoms reported, denies see HPI, denies 

anxiety, denies depressed, denies emotional problems, denies headache, denies 

numbness, denies paresthesia, denies pre-existing deficit, denies seizure, denie

s tingling, denies tremors, denies weakness, denies other


Hematologic/Lymphatic:  denies no symptoms reported, denies see HPI, denies 

anemia, denies blood clots, denies easy bleeding, denies easy bruising, denies 

swollen glands, denies other


Immunological/Allergic:  denies no symptoms reported, denies see HPI, denies 

food allergy, denies grass allergy, denies mold allergy, denies pollen allergy, 

denies HIV/AIDS, denies transplant


All Other Systems:  Reviewed and Negative





Physical Exam


General Appearance:  Moderate Distress


EENT:  eyes nml inspection, nml ENT inspection, pharynx nml


Neck:  Non-Tender, Full Range of Motion, Supple, Normal Inspection


Respiratory:  chest non-tender, no accessory muscle use, decreased breath 

sounds, crackles, rales, wheezing


CVS:  tachycardia, slow capillary refill (Weak pulses, irregular rhythm)


Gastrointestinal:  Normal Bowel Sounds, No Organomegaly, No Pulsatile Mass, Non 

Tender, Soft


Back:  Normal Inspection, No CVA Tenderness


Extremities:  Normal Range of Motion, Non-Tender, Pedal Edema, Slow Capillary 

Refill


Neurologic/Psychiatric:  CNs II-XII NML as Tested, No Motor/Sensory Deficits, 

Alert, Normal Mood/Affect, Oriented x 3


Skin:  Normal Color, Warm/Dry, Cyanosis


Lymphatic:  No Adenopathy





Results/Orders


Results/Orders





Orders - LAMONT ROSADO MD


Cbc With Auto Diff (6/2/22 10:34)


Comprehensive Metabolic Panel (6/2/22 10:34)


Probnp    B-Type Np (6/2/22 10:34)


D-Dimer (6/2/22 10:34)


Xr Chest 1v (6/2/22 10:34)


PT (6/2/22 10:34)


Partial Thromboplastin Time. (6/2/22 10:34)


Ekg-Routine (6/2/22 10:34)


Troponin I High Sensitivity (6/2/22 10:34)


Thyroid Stimulating Horm(Ml) (6/2/22 10:34)


Diltiazem Hcl (Cardizem) (6/2/22 10:51)


Furosemide (Lasix) (6/2/22 10:51)


Albuterol Sulfate (Ventolin) (6/2/22 11:00)


Furosemide (Lasix) (6/2/22 11:01)


Diltiazem Hcl (Cardizem) (6/2/22 11:01)


Albuterol Sulfate (Ventolin) (6/2/22 11:06)


Amiodarone Hcl (Cordarone) (6/2/22 11:30)


Amiodarone Hcl (Cordarone) (6/2/22 11:15)


Amiodarone In Dextrose,Iso-Osm (Nexteron (6/2/22 12:00)


Amiodarone In Dextrose,Iso-Osm (Nexteron (6/2/22 11:44)


Drug Scrn Med W Confirmation (6/2/22 11:46)


Enoxaparin Sodium (Lovenox) (6/2/22 11:57)


Resuscitation Status (6/2/22 11:57)


Routine Vital Signs (6/2/22 11:57)


Bedrest (6/2/22 11:57)


Cardiac Diet (6/2/22 Dinner)


Intake & Output (6/2/22 11:57)


Troponin I High Sensitivity (6/2/22 16:00)


Troponin I High Sensitivity (6/2/22 18:00)


Magnesium (6/2/22 11:57)





Vital Signs








  Date Time  Temp Pulse Resp B/P (MAP) Pulse Ox O2 Delivery O2 Flow Rate FiO2


 


6/2/22 11:55 97.6 143 20 120/98 (105) 99 Room Air* 0 21


 


6/2/22 11:24 97.6 132 24 142/90 (107) 99 Room Air* 0 21


 


6/2/22 11:07    150/105    


 


6/2/22 11:04  187      


 


6/2/22 11:00  144 24  98 Room Air* 0 21


 


6/2/22 11:00  148 22  98 Room Air* 0 21


 


6/2/22 10:15 97.6 69 24     


 


6/2/22 10:15 97.6 69 24 185/72 (109) 100 Room Air* 0 21 6/2/22 10:15 97.6 69 24  100   








Administered Medications








 Medications


  (Trade)  Dose


 Ordered  Sig/Foreign


 Route


 PRN Reason  Start Time


 Stop Time Status Last Admin


Dose Admin


 


 Albuterol Sulfate


  (Ventolin)  2.5 mg  OT  ONCE


 IH


   6/2/22 11:00


 6/2/22 11:01 DC 6/2/22 11:12


2.5 MG


 


 Amiodarone HCl


  (Cordarone)  150 mg  OT  ONCE


 IV


   6/2/22 11:30


 6/2/22 11:31 DC 6/2/22 11:19


150 MG


 


 Amiodarone HCL/


 Dextrose  200 ml @ 0


 mls/hr  IV


 


   6/2/22 12:00


 7/2/22 11:59  6/2/22 11:48


34 MLS/HR


 


 Diltiazem HCl


  (Cardizem)  20 mg  STAT  STAT


 IV


   6/2/22 10:51


 6/2/22 10:53 DC 6/2/22 11:04


20 MG


 


 Furosemide


  (Lasix)  40 mg  STAT  STAT


 IV


   6/2/22 10:51


 6/2/22 10:53 DC 6/2/22 11:07


40 MG








                                Laboratory Tests








Test


 6/2/22


10:44


 


White Blood Count


 8.0 10^3/uL


(4.5-11.0)


 


Red Blood Count


 5.09 10^6/uL


(4.50-5.90)


 


Hemoglobin


 17.0 g/dL


(13.9-16.3)  H


 


Hematocrit


 51.2 %


(37.0-53.0)


 


Mean Corpuscular Volume


 100.6 fL


()  H


 


Mean Corpuscular Hemoglobin


 33.4 pg


(26-34)


 


Mean Corpuscular Hemoglobin


Concent 33.2 g/dL


(33-36.5)


 


Red Cell Distribution Width


 15.0 %


(11.5-14.5)  H


 


Platelet Count


 176 10^3/uL


(150-400)


 


Mean Platelet Volume


 10.2 fL


(7.8-11.0)


 


Neutrophils (%) (Auto)


 64.8 %


(41.0-85.0)


 


Lymphocytes (%) (Auto)


 24.6 %


(24.0-44.0)


 


Monocytes (%) (Auto)


 8.4 %


(5.0-12.0)


 


Neutrophils # (Auto)


 5.2 10^3/uL


(1.8-7.7)


 


Lymphocytes # (Auto)


 1.97 10^3/uL1


(1.0-4.8)


 


Monocytes # (Auto)


 0.7 10^3/uL


(0.3-0.8)


 


Absolute Immature Granulocyte


(auto 0.01 10^3 u/L


(0-2)


 


Absolute Eosinophils (auto)


 0.1 10^3/uL


(0.0-0.2)


 


Immature Granulocytes %


 0.10 %


(0.00-0.50)


 


Eosinophils %


 1.5 %


(0.0-5.0)


 


Basophils %


 0.6 %


(0.0-0.2)  H


 


Basophils #


 0.1 10^3/uL


(0.0-0.1)


 


Prothrombin Time


 13.8 SEC


(9.1-11.5)  H


 


Prothrombin Time INR


(Non-Therap) 1.4  





 


Activated Partial


Thromboplast Time 25.2 SEC


(22.5-33.1)


 


D-Dimer


 2.16 mg/L


(0.19-0.49)  *H


 


Sodium Level


 136 mmol/L


(132-145)


 


Potassium Level


 4.3 mmol/L


(3.6-5.2)


 


Chloride Level


 104.0 mmol/L


()


 


Carbon Dioxide Level


 25.4 mmol/L


(20.0-32)


 


Anion Gap 10.9  


 


Blood Urea Nitrogen


 17 mg/dL


(7-18)


 


Creatinine


 1.22 mg/dL


(0.59-1.40)


 


Estimated GFR (


American) 79.2 (>/=60)  





 


Est GFR (CKD-EPI)(Non-Afr


American) 65.5 (>/=60)  





 


BUN/Creatinine Ratio 13.0  


 


Glucose Level


 115 mg/dL


()  H


 


Calcium Level


 8.7 mg/dL


(8.4-10.5)


 


Total Bilirubin


 1.9 mg/dL


(0.2-1.0)  H


 


Aspartate Amino Transferase


(AST) 20 U/L (0-35)  





 


Alanine Aminotransferase (ALT)


 14 U/L (12-78)





 


Alkaline Phosphatase


 117 U/L


()


 


Troponin I High Sensitivity


 24 ng/L (0-75)





 


Pro-B-Type Natriuretic Peptide


 51894 pg/mL


(0-125)  H


 


Total Protein


 6.7 g/dL


(6.4-8.2)


 


Albumin


 3.2 g/dL


(3.4-5.0)  L


 


Globulin 3.5  


 


Albumin/Globulin Ratio 0.914  


 


Thyroid Stimulating Hormone


(TSH) 5.218 mIU/mL


(0.358-3.740)











Progress


Progress


Patient will need to be admitted to the ICU on amiodarone drip.  No improvement 

with IV Cardizem.  Given some IV Lasix.  Case discussed with cardiology and also

the hospitalist.





EKG/XRAY/CT/US


EKG Comments:  A. fib rate 165, QRS 87, lateral T wave inversions


XRAY Comments:  Chest x-ray showed no acute disease





ER DEPART


Departure


Time of Disposition:  12:09


Disposition:  09 ADMITTED AS INPATIENT


Impression:  


   Primary Impression:  


   Congestive heart failure


   Additional Impressions:  


   A-fib


   Atrial fibrillation with RVR


Condition:  Critical


Referrals:  


PCP,UNKNOWN (PCP)


PRIMARY CARE PROVIDER


Duration or Time Spent with Pa:  60





Critical Care Note


Total Time (mins):  60


Comments


60 minutes total critical care which include multiple bedside visits.  Physical 

exam.  Ordering labs and medications.  Reviewed the result of this labs and 

imaging.  Discussed with the admitting and consulting doctors.





Problem Qualifiers











LAMONT ROSADO MD            Jun 2, 2022 12:09

## 2022-06-03 VITALS — SYSTOLIC BLOOD PRESSURE: 128 MMHG | DIASTOLIC BLOOD PRESSURE: 93 MMHG

## 2022-06-03 VITALS — SYSTOLIC BLOOD PRESSURE: 124 MMHG | DIASTOLIC BLOOD PRESSURE: 90 MMHG

## 2022-06-03 VITALS — DIASTOLIC BLOOD PRESSURE: 76 MMHG | SYSTOLIC BLOOD PRESSURE: 104 MMHG

## 2022-06-03 VITALS — SYSTOLIC BLOOD PRESSURE: 113 MMHG | DIASTOLIC BLOOD PRESSURE: 71 MMHG

## 2022-06-03 VITALS — DIASTOLIC BLOOD PRESSURE: 92 MMHG | SYSTOLIC BLOOD PRESSURE: 173 MMHG

## 2022-06-03 VITALS — SYSTOLIC BLOOD PRESSURE: 112 MMHG | DIASTOLIC BLOOD PRESSURE: 73 MMHG

## 2022-06-03 VITALS — SYSTOLIC BLOOD PRESSURE: 124 MMHG | DIASTOLIC BLOOD PRESSURE: 89 MMHG

## 2022-06-03 VITALS — SYSTOLIC BLOOD PRESSURE: 113 MMHG | DIASTOLIC BLOOD PRESSURE: 60 MMHG

## 2022-06-03 VITALS — SYSTOLIC BLOOD PRESSURE: 115 MMHG | DIASTOLIC BLOOD PRESSURE: 65 MMHG

## 2022-06-03 VITALS — DIASTOLIC BLOOD PRESSURE: 115 MMHG | SYSTOLIC BLOOD PRESSURE: 147 MMHG

## 2022-06-03 VITALS — DIASTOLIC BLOOD PRESSURE: 75 MMHG | SYSTOLIC BLOOD PRESSURE: 105 MMHG

## 2022-06-03 VITALS — SYSTOLIC BLOOD PRESSURE: 115 MMHG | DIASTOLIC BLOOD PRESSURE: 77 MMHG

## 2022-06-03 VITALS — SYSTOLIC BLOOD PRESSURE: 107 MMHG | DIASTOLIC BLOOD PRESSURE: 71 MMHG

## 2022-06-03 VITALS — DIASTOLIC BLOOD PRESSURE: 71 MMHG | SYSTOLIC BLOOD PRESSURE: 120 MMHG

## 2022-06-03 VITALS — SYSTOLIC BLOOD PRESSURE: 105 MMHG | DIASTOLIC BLOOD PRESSURE: 69 MMHG

## 2022-06-03 VITALS — SYSTOLIC BLOOD PRESSURE: 119 MMHG | DIASTOLIC BLOOD PRESSURE: 71 MMHG

## 2022-06-03 LAB
BASOPHILS # BLD AUTO: 0.1 10^3/UL (ref 0–0.1)
BASOPHILS NFR BLD AUTO: 0.5 % (ref 0–0.2)
CO2 BLDA-SCNC: 17.7 MMOL/L (ref 20–32)
EOSINOPHIL # BLD AUTO: 0 10^3/UL (ref 0–0.2)
EOSINOPHIL NFR BLD AUTO: 0.2 % (ref 0–5)
ERYTHROCYTE [DISTWIDTH] IN BLOOD BY AUTOMATED COUNT: 15.4 % (ref 11.5–14.5)
GLUCOSE PRE 100 G GLC PO SERPL-MCNC: 111 MG/DL (ref 70–110)
LYMPHOCYTES # BLD AUTO: 1.75 10^3/UL1 (ref 1–4.8)
LYMPHOCYTES NFR BLD AUTO: 17.9 % (ref 24–44)
MCH RBC QN AUTO: 32.7 PG (ref 26–34)
MONOCYTES # BLD AUTO: 0.9 10^3/UL (ref 0.3–0.8)
MONOCYTES NFR BLD AUTO: 9.3 % (ref 5–12)
NEUTROPHILS # BLD AUTO: 7.1 10^3/UL (ref 1.8–7.7)
NEUTROPHILS NFR BLD AUTO: 72.1 % (ref 41–85)
PLATELET # BLD AUTO: 167 10^3/UL (ref 150–400)

## 2022-06-03 RX ADMIN — SOTALOL HYDROCHLORIDE SCH MG: 80 TABLET ORAL at 09:22

## 2022-06-03 RX ADMIN — FUROSEMIDE SCH MG: 10 INJECTION INTRAVENOUS at 09:21

## 2022-06-03 RX ADMIN — APIXABAN SCH MG: 5 TABLET, FILM COATED ORAL at 09:21

## 2022-06-03 RX ADMIN — APIXABAN SCH MG: 5 TABLET, FILM COATED ORAL at 20:21

## 2022-06-03 RX ADMIN — PANTOPRAZOLE SODIUM SCH MG: 40 TABLET, DELAYED RELEASE ORAL at 09:21

## 2022-06-03 RX ADMIN — SOTALOL HYDROCHLORIDE SCH MG: 80 TABLET ORAL at 20:21

## 2022-06-03 NOTE — NUR
TELEPHONE ORDER PER DR MCGRATH TO D/C AMIODARONE GTT.

ADMIN 40MG SOTALOL OT PO NOW AND CHANGE ORDER TO 120MG PO BID.

TELEPHONE ORDER [facility]AV.

## 2022-06-03 NOTE — PRM.PN
Subjective


Subjective


Date:  Odell 3, 2022


Time:  14:32


Subjective


He denies any chest pain . He states his shortness of breath is significantly 

better from yesterday and is no longer feeling palpitations like before.





VTE


VTE Risk Total Score:  >5


VTE Risk Score


VTE Risk:


Score 0-1 = Low Risk


(Aggressive mobilization; early ambulation; no VTE prophylaxis required)


Score 2: Moderate Risk


(Intermittent/Pneumatic Compression Device OR Lovenox/Heparin/Coumadin)


Score 3-4: High Risk


(Intermittent/Pneumatic Compression Device AND Lovenox/Heparin/Coumadin)


Score  > or =5: Highest Risk


(Intermittent/Pneumatic Compression Device AND Lovenox/Heparin/Coumadin)


Antico:Hep/LMWH/Coum/Xarelto:  Yes





Review of Systems


Constitutional:  Weakness, Other (fatigue); No: Fever, Sweats


Eyes:  No: Pain


ENT:  No: Ear pain, Nose pain, Throat pain


Respiratory:  SOB with excertion; No: Cough, Shortness of breath, Other


Cardiovascular:  No: Chest Pain, Palpitations, Paroxysmal Noc. Dyspnea, Other


Gastrointestinal:  No: Nausea, Abdominal Pain


Genitourinary:  No Frequency, No Incontinence


Musculoskeletal:  No: neck pain, shoulder pain, arm pain


Neurological:  Weakness


Allergies:  


Coded Allergies:  


     No Known Allergies (Unverified , 12/17/16)


Scheduled


Diltiazem Hcl (Cardizem), 60 MG PO TID


Lisinopril (Lisinopril), 20 MG PO DAILY


Omeprazole Magnesium (Prilosec Otc), 1 TAB PO QD


Sotalol Hcl (Sotalol), 0.5 TAB PO BID


Warfarin Sodium (Warfarin Sodium), 10 MG PO DAILY24





Objective


Vitals and I/O





Vital Sign - Last 24 Hours








 6/2/22 6/2/22 6/2/22 6/2/22





 14:45 14:46 15:00 15:15


 


Pulse 125  136 130


 


Resp 18  26 16


 


B/P (MAP) 134/96 (109)  101/75 (84) 98/52 (67)


 


Pulse Ox 96  97 98


 


O2 Delivery Room Air* Room Air Room Air* Room Air*


 


O2 Flow Rate 0  0 0


 


FiO2 21 21 21 6/2/22 6/2/22 6/2/22 6/2/22





 15:26 16:00 16:00 16:01


 


Pulse 125  121 117


 


Resp 22  21 12


 


B/P (MAP) 131/98 (109)   106/87 (93)


 


Pulse Ox 99  98 98


 


O2 Delivery Room Air* Room Air Room Air* Room Air*


 


O2 Flow Rate 0  0 0


 


FiO2 21  21 21 6/2/22 6/2/22 6/2/22 6/2/22





 16:15 16:30 16:31 16:45


 


Pulse 113 122 119 116


 


Resp 11 30 22 18


 


B/P (MAP) 120/77 (91)  113/72 (86) 91/47 (62)


 


Pulse Ox  97 97 98


 


O2 Delivery Room Air* Room Air* Room Air* Room Air*


 


O2 Flow Rate 0 0 0 0


 


FiO2 21 21 21 21 6/2/22 6/2/22 6/2/22 6/2/22





 17:00 17:15 17:20 17:22


 


Pulse 134 113 103 


 


Resp 11 16 23 


 


B/P (MAP) 93/45 (61) 113/75 (88) 113/67 (82) 114/74


 


Pulse Ox 99 99 91 


 


O2 Delivery Room Air* Room Air* Room Air* 


 


O2 Flow Rate 0 0 0 


 


FiO2 21 21 21 6/2/22 6/2/22 6/2/22 6/2/22





 17:22 17:30 17:32 17:45


 


Pulse 109 106 121 113


 


Resp 28 14 20 18


 


B/P (MAP) 114/74 (87)  107/84 (92) 


 


Pulse Ox  94 92 92


 


O2 Delivery Room Air* Room Air* Room Air* Room Air*


 


O2 Flow Rate 0 0 0 0


 


FiO2 21 21 21 21 6/2/22 6/2/22 6/2/22 6/2/22





 17:47 17:52 17:54 17:56


 


Pulse 118 110 120 112


 


Resp 11 16 34 26


 


B/P (MAP)    97/65 (76)


 


Pulse Ox 90 92 96 95


 


O2 Delivery Room Air* Room Air* Room Air* Room Air*


 


O2 Flow Rate 0 0 0 0


 


FiO2 21 21 21 21 6/2/22 6/2/22 6/2/22 6/2/22





 18:00 18:02 18:05 18:45


 


Pulse 115 117 128 124


 


Resp 23 21 30 22


 


B/P (MAP)  136/64 (88) 111/75 (87) 101/83 (89)


 


Pulse Ox 94 97 97 


 


O2 Delivery Room Air* Room Air* Room Air* Room Air*


 


O2 Flow Rate 0 0 0 0


 


FiO2 21 21 21 21 6/2/22 6/2/22 6/2/22 6/2/22





 19:00 19:15 19:30 19:45


 


Pulse 115 110 114 111


 


Resp 9 23 21 35


 


B/P (MAP) 86/71 (76) 104/64 (77) 136/109 (118) 136/92 (107)


 


Pulse Ox  100  


 


O2 Delivery Room Air* Room Air* Room Air* Room Air*


 


O2 Flow Rate 0 0 0 0


 


FiO2 21 21 21 21 6/2/22 6/2/22 6/2/22 6/2/22





 20:02 20:15 20:15 20:30


 


Temp    97.9


 


Pulse 119  114 112


 


Resp 33  21 56


 


B/P (MAP) 102/84 (90)  109/88 (95) 112/56 (74)


 


Pulse Ox   96 


 


O2 Delivery Room Air* Room Air Room Air* Room Air*


 


O2 Flow Rate 0  0 0


 


FiO2 21 21 21 6/2/22 6/2/22 6/2/22 6/2/22





 20:45 21:00 21:16 21:40


 


Pulse 109 122 121 120


 


Resp 24 25 13 10


 


B/P (MAP) 110/82 (91) 101/82 (88) 105/83 (90) 111/70 (84)


 


Pulse Ox  94  


 


O2 Delivery Room Air* Room Air* Room Air* Room Air*


 


O2 Flow Rate 0 0 0 0


 


FiO2 21 21 21 21 6/2/22 6/2/22 6/2/22 6/2/22





 21:46 22:34 23:22 23:30


 


Pulse 116 127  115


 


Resp 22 24  22


 


B/P (MAP) 105/64 (78) 111/85 (94)  


 


Pulse Ox 93   87


 


O2 Delivery Room Air* Nasal Cannula* Nasal Cannula Nasal Cannula*


 


O2 Flow Rate 0 2 2.00 2


 


FiO2 21 28  28





 6/2/22 6/3/22 6/3/22 6/3/22





 23:56 00:02 00:45 01:00


 


Temp 97.7   


 


Pulse 110 116 130 133


 


Resp 14 25 17 37


 


B/P (MAP) 112/71 (85)   173/92 (119)


 


Pulse Ox 95 87 93 87


 


O2 Delivery Nasal Cannula* Nasal Cannula* Nasal Cannula* Nasal Cannula*


 


O2 Flow Rate 2 2 2 2


 


FiO2 28 28 28 28


 


    





 6/3/22 6/3/22 6/3/22 6/3/22





 01:03 01:45 02:02 02:15


 


Pulse 135 109 113 112


 


Resp 18 24 22 32


 


B/P (MAP) 112/73 (86)  113/60 (77) 


 


Pulse Ox  90 85 93


 


O2 Delivery Nasal Cannula* Nasal Cannula* Nasal Cannula* Nasal Cannula*


 


O2 Flow Rate 2 2 2 2


 


FiO2 28 28 28 28





 6/3/22 6/3/22 6/3/22 6/3/22





 02:30 02:45 03:00 03:15


 


Pulse 101 99 101 111


 


Resp 15  21 26


 


B/P (MAP)   128/93 (105) 


 


Pulse Ox 88 89 88 89


 


O2 Delivery Nasal Cannula* Nasal Cannula* Nasal Cannula* Nasal Cannula*


 


O2 Flow Rate 2 2 3 3


 


FiO2 28 28 32 32





 6/3/22 6/3/22 6/3/22 6/3/22





 03:30 03:40 03:45 04:00


 


Temp    97.8


 


Pulse 111  109 97


 


Resp 18  15 19


 


B/P (MAP)    147/115 (126)


 


Pulse Ox 78  85 


 


O2 Delivery Nasal Cannula* Nasal Cannula Nasal Cannula* Nasal Cannula*


 


O2 Flow Rate 3 3.00 3 3


 


FiO2 32  32 32


 


    





 6/3/22 6/3/22 6/3/22 6/3/22





 04:30 04:45 05:00 05:15


 


Pulse 109 106 94 103


 


Resp 25 20 24 34


 


B/P (MAP)   107/71 (83) 


 


Pulse Ox 94 96 87 93


 


O2 Delivery Nasal Cannula* Nasal Cannula* Nasal Cannula* Nasal Cannula*


 


O2 Flow Rate 3 3 3 3


 


FiO2 32 32 32 32





 6/3/22 6/3/22 6/3/22 6/3/22





 05:30 05:45 06:00 06:15


 


Pulse 93 96 100 112


 


Resp 23 27 21 27


 


Pulse Ox 81 92 87 89


 


O2 Delivery Nasal Cannula* Nasal Cannula* Nasal Cannula* Nasal Cannula*


 


O2 Flow Rate 3 3 3 3


 


FiO2 32 32 32 32





 6/3/22 6/3/22 6/3/22 6/3/22





 06:30 06:45 07:00 07:01


 


Pulse 113 89 81 90


 


Resp 32 15  13


 


B/P (MAP)    105/75 (85)


 


Pulse Ox 93 96 95 100


 


O2 Delivery Nasal Cannula* Nasal Cannula* Nasal Cannula* Nasal Cannula*


 


O2 Flow Rate 3 3 3 3


 


FiO2 32 32 32 32





 6/3/22 6/3/22 6/3/22 6/3/22





 07:15 07:30 07:45 08:00


 


Pulse 92 92 112 99


 


Resp 6  30 7


 


Pulse Ox 94 100 96 99


 


O2 Delivery Nasal Cannula* Nasal Cannula* Nasal Cannula* Nasal Cannula*


 


O2 Flow Rate 3 3 2 2


 


FiO2 32 32 28 28





 6/3/22 6/3/22 6/3/22 6/3/22





 08:00 08:01 08:15 08:30


 


Pulse  103 93 96


 


Resp  15  20


 


B/P (MAP)  119/71 (87)  


 


Pulse Ox  99 96 87


 


O2 Delivery Nasal Cannula Nasal Cannula* Nasal Cannula* Nasal Cannula*


 


O2 Flow Rate 3.00 2 2 2


 


FiO2  28 28 28





 6/3/22 6/3/22 6/3/22 6/3/22





 08:45 09:00 09:15 09:21


 


Pulse 112 104 95 


 


Resp  25 6 


 


B/P (MAP)  104/76 (85)  104/76


 


Pulse Ox 94 95 98 


 


O2 Delivery Nasal Cannula* Nasal Cannula* Nasal Cannula* 


 


O2 Flow Rate 2 2 2 


 


FiO2 28 28 28 





 6/3/22 6/3/22 6/3/22 6/3/22





 09:30 09:33 09:45 10:00


 


Pulse 97 93 93 98


 


Resp 18 18 23 20


 


B/P (MAP)    115/77 (90)


 


Pulse Ox 95 98 96 90


 


O2 Delivery Nasal Cannula* Nasal Cannula* Nasal Cannula* Nasal Cannula*


 


O2 Flow Rate 2 3 2 2


 


FiO2 28 32 28 28





 6/3/22 6/3/22 6/3/22 6/3/22





 10:15 10:30 10:45 11:00


 


Pulse 92 93 117 102


 


Resp 19 29 19 13


 


B/P (MAP)    105/69 (81)


 


Pulse Ox 99 95 95 96


 


O2 Delivery Nasal Cannula* Nasal Cannula* Nasal Cannula* Nasal Cannula*


 


O2 Flow Rate 2 2 2 2


 


FiO2 28 28 28 28





 6/3/22 6/3/22 6/3/22 6/3/22





 11:15 11:30 11:45 12:00


 


Pulse 97 99 113 110


 


Resp 19 11 19 50


 


B/P (MAP)    115/65 (82)


 


Pulse Ox 96 96 96 94


 


O2 Delivery Nasal Cannula* Nasal Cannula* Nasal Cannula* Nasal Cannula*


 


O2 Flow Rate 2 2 2 2


 


FiO2 28 28 28 28





 6/3/22 6/3/22 6/3/22 6/3/22





 12:15 12:30 12:45 13:00


 


Pulse 118 98 90 94


 


Resp 18 20 14 23


 


B/P (MAP)    113/71 (85)


 


Pulse Ox 93 95 96 95


 


O2 Delivery Nasal Cannula* Nasal Cannula* Nasal Cannula* Nasal Cannula*


 


O2 Flow Rate 2 2 2 2


 


FiO2 28 28 28 28





 6/3/22 6/3/22  





 13:15 13:51  


 


Pulse 94 102  


 


Resp 27 22  


 


Pulse Ox 95 97  


 


O2 Delivery Nasal Cannula* Nasal Cannula*  


 


O2 Flow Rate 2 2  


 


FiO2 28 28  














Intake and Output 


 


 6/3/22





 07:00


 


Intake Total 1748 ml


 


Output Total 1000 ml


 


Balance 748 ml








General:  Alert, Oriented X3, No acute distress


HEENT:  Atraumatic, PERRLA, Mucous membr. moist/pink


Neck:  Supple


Lungs:  Normal air movement, Other (02 @ 2LNC ; scattered crackles)


Heart:  Regular rate


Abdomen:  No tenderness


Extremities:  No clubbing, Other (Bilateral lower extremity edema)


Skin:  No rashes


Neuro:  Normal speech, Normal tone


Psych/Mental Status:  Mental status NL, Mood NL


All Results(Lab/Rad)





Laboratory Tests








Test


 6/2/22


12:14 6/2/22


16:16 6/3/22


04:05


 


Urine Opiates Screen NEGATIVE   


 


Urine Methadone Screen NEGATIVE   


 


Urine Barbiturates Screen NEGATIVE   


 


Urine Phencyclidine Screen NEGATIVE   


 


Ur


Amphetamine/Methamphetamine NEGATIVE 


 


 





 


Urine MDMA Screen (Ecstasy) NEGATIVE   


 


Urine Benzodiazepines Screen NEGATIVE   


 


Urine Cocaine Metabolite


Screen NEGATIVE 


 


 





 


Ur Tetrahydrocannabinol (THC)


Scrn PRESUMPTIVE


POSITIVE 


 





 


Troponin I High Sensitivity  27 ng/L  


 


White Blood Count   9.8 10^3/uL 


 


Red Blood Count   5.02 10^6/uL 


 


Hemoglobin   16.4 g/dL 


 


Hematocrit   50.7 % 


 


Mean Corpuscular Volume   101.0 fL 


 


Mean Corpuscular Hemoglobin   32.7 pg 


 


Mean Corpuscular Hemoglobin


Concent 


 


 32.3 g/dL 





 


Red Cell Distribution Width   15.4 % 


 


Platelet Count   167 10^3/uL 


 


Mean Platelet Volume   10.7 fL 


 


Neutrophils (%) (Auto)   72.1 % 


 


Lymphocytes (%) (Auto)   17.9 % 


 


Monocytes (%) (Auto)   9.3 % 


 


Neutrophils # (Auto)   7.1 10^3/uL 


 


Lymphocytes # (Auto)   1.75 10^3/uL1 


 


Monocytes # (Auto)   0.9 10^3/uL 


 


Absolute Immature Granulocyte


(auto 


 


 0.01 10^3 u/L 





 


Absolute Eosinophils (auto)   0.0 10^3/uL 


 


Immature Granulocytes %   0.10 % 


 


Eosinophils %   0.2 % 


 


Basophils %   0.5 % 


 


Basophils #   0.1 10^3/uL 


 


Sodium Level   133 mmol/L 


 


Potassium Level   4.8 mmol/L 


 


Chloride Level   103.0 mmol/L 


 


Carbon Dioxide Level   17.7 mmol/L 


 


Anion Gap   17.1 


 


Blood Urea Nitrogen   21 mg/dL 


 


Creatinine   1.81 mg/dL 


 


Estimated GFR (


American) 


 


 50.2 





 


Est GFR (CKD-EPI)(Non-Afr


American) 


 


 41.5 





 


BUN/Creatinine Ratio   11.0 


 


Glucose Level   111 mg/dL 


 


Calcium Level   8.8 mg/dL 


 


Total Bilirubin   1.8 mg/dL 


 


Aspartate Amino Transf


(AST/SGOT) 


 


 39 U/L 





 


Alanine Aminotransferase


(ALT/SGPT) 


 


 20 U/L 





 


Alkaline Phosphatase   102 U/L 


 


Total Protein   6.2 g/dL 


 


Albumin   2.8 g/dL 


 


Globulin   3.4 


 


Albumin/Globulin Ratio   0.823 








Current Medications








 Medications


  (Trade)  Dose


 Ordered  Sig/Foreign


 Route


 PRN Reason  Start Time


 Stop Time Status Last Admin


Dose Admin


 


 Diltiazem HCl


  (Cardizem)  20 mg  STAT  STAT


 IV


   6/2/22 10:51


 6/2/22 10:53 DC 6/2/22 11:04





 


 Furosemide


  (Lasix)  40 mg  STAT  STAT


 IV


   6/2/22 10:51


 6/2/22 10:53 DC 6/2/22 11:07





 


 Albuterol Sulfate


  (Ventolin)  2.5 mg  OT  ONCE


 IH


   6/2/22 11:00


 6/2/22 11:01 DC 6/2/22 11:12





 


 Furosemide


  (Lasix)  40 mg  STK-MED ONCE


 .ROUTE


   6/2/22 11:01


 6/2/22 11:02 DC  





 


 Diltiazem HCl


  (Cardizem)  125 mg  STK-MED ONCE


 .ROUTE


   6/2/22 11:01


 6/2/22 11:02 DC  





 


 Albuterol Sulfate


  (Ventolin)  2.5 mg  STK-MED ONCE


 IH


   6/2/22 11:06


 6/2/22 11:06 DC  





 


 Amiodarone HCl


  (Cordarone)  150 mg  OT  ONCE


 IV


   6/2/22 11:30


 6/2/22 11:31 DC 6/2/22 11:19





 


 Amiodarone HCl


  (Cordarone)  150 mg  STK-MED ONCE


 .ROUTE


   6/2/22 11:15


 6/2/22 11:15 DC  





 


 Amiodarone HCL/


 Dextrose  200 ml @ 0


 mls/hr  IV


 


   6/2/22 12:00


 7/2/22 11:59  6/2/22 21:19





 


 Amiodarone HCL/


 Dextrose  200 ml @ ud  STK-MED ONCE


 IV


   6/2/22 11:44


 6/2/22 11:44 DC  





 


 Enoxaparin Sodium


  (Lovenox)  100 mg  STAT  STAT


 SQ


   6/2/22 11:57


 6/2/22 12:02 DC 6/2/22 12:06





 


 Enoxaparin Sodium


  (Lovenox)  100 mg  STK-MED ONCE


 SQ


   6/2/22 12:04


 6/2/22 12:04 DC  





 


 Acetaminophen


  (Tylenol)  1,000 mg  Q6H  PRN


 PO


 PAIN 1 - 3  6/2/22 14:00


 7/2/22 13:59  6/3/22 00:49





 


 Pantoprazole


 Sodium


  (Protonix)  40 mg  DAILY


 PO


   6/3/22 09:00


 7/3/22 08:59  6/3/22 09:21





 


 Enoxaparin Sodium


  (Lovenox)  120 mg  Q12H


 SQ


   6/2/22 14:00


 6/2/22 14:04 DC  





 


 Sotalol HCl


  (Betapace)  80 mg  STAT  STAT


 PO


   6/2/22 14:00


 6/2/22 14:31 DC 6/2/22 14:16





 


 Sotalol HCl


  (Betapace)  80 mg  BID


 PO


   6/2/22 21:00


 6/3/22 01:07 DC 6/2/22 20:29





 


 Furosemide


  (Lasix)  40 mg  STAT  STAT


 IV


   6/2/22 17:13


 6/2/22 20:33 DC 6/2/22 17:22





 


 Furosemide


  (Lasix)  40 mg  DAILY


 IV


   6/3/22 09:00


 7/3/22 08:59  6/3/22 09:21





 


 Lisinopril


  (Zestril)  20 mg  DAILY


 PO


   6/3/22 09:00


 7/3/22 08:59   





 


 Calcium Carbonate/


 Glycine


  (Tums)  500 mg  Q4HR  PRN


 PO


 HEARTBURN  6/2/22 21:30


 6/3/22 11:00 DC 6/2/22 21:19





 


 Ondansetron HCl


  (Zofran)  4 mg  Q6HR  PRN


 IV


 NAUSEA / VOMITING  6/2/22 22:00


 7/2/22 21:59  6/2/22 22:24





 


 Sotalol HCl


  (Betapace)  120 mg  BID


 PO


   6/3/22 09:00


 7/3/22 08:59  6/3/22 09:22





 


 Sotalol HCl


  (Betapace)  40 mg  STAT  STAT


 PO


   6/3/22 01:05


 6/3/22 01:51 DC 6/3/22 01:10














Assessment/Plan





1.  Hx of Nonischemic cardiomyopathy 


2.  Chronic atrial fibrillation


3.  Hypertension.


4.  Acute on Chronic systolic heart failure.


5.  Anxiety/depression.


6.  Hyperlipidemia.


7.  Obstructive sleep apnea.


8.  GERD


9.  Non compliance with medications


10. Non compliance with outpatient office visits


11. Tobacco use


12.  Most recent echo done October 2021 showed LVEF of 55-60%








Dr. Mcgrath:





Continue cardiac meds:Lisinopril 20 mg PO daily, Lasix 40 mg IV daily, KCL 20 

meq daily, Sotalol 120 mg PO BID, and Eliquis 5 mg PO BID. 


Heart rate is currently controlled. (Goal is <120bpm)


Fluid restriction to 1.5L/day


Strict I and O's


Daily weights


Na restriction


2D echo is not available at the hospital at this time.


Would benefit from SGLT2 inh and Entresto-however cannot afford meds due to 

financial burden


No invasive w/up planned at this time


Patient agrees to follow up in clinic upon discharge.


Lifestyle modification factors have been strongly advised. 


Will sign off.





Duration


Duration or Time Spent with Pa:  30











ESCOBAR MCNEIL NP       Odell 3, 2022 14:38


BROOKLYN MCGRATH DO               Odell 3, 2022 16:59

## 2022-06-03 NOTE — CNH
DATE OF CONSULTATION: 06/02/2022

DICTATOR NAME: BROOKLYN MCGRATH DO

REASON FOR CONSULTATION:  Atrial fibrillation with rapid ventricular 

response/acute decompensated heart failure secondary to systolic and diastolic 

dysfunction.



HISTORY OF PRESENT ILLNESS:  This is a 41-year-old male who presented to the 

Emergency Room with progressively worsening shortness of breath with orthopnea 

and paroxysmal nocturnal dyspnea.  He was also experiencing persistent 

palpitations.  He has a history of chronic congestive heart failure secondary to

systolic and diastolic dysfunction as well as chronic AFib.  He is noncompliant 

to medication as well as noncompliant to office followup visits.  Upon 

presentation to the ED, his proBNP was noted to be 12,450.  High-sensitive 

troponin was negative x2.  EKG revealed atrial fibrillation with rapid 

ventricular response with a heart rate of 165 beats per minute.  A consultation 

has been placed to Cardiology service for evaluation for AFib with RVR as well 

as decompensated heart failure.



PAST MEDICAL HISTORY:  Significant for:

1.  Chronic congestive heart failure.

2.  Chronic atrial fibrillation.

3.  Nonischemic dilated cardiomyopathy.

4.  Hypertension.

5.  Anxiety/depression.

6.  Hyperlipidemia.

7.  Obstructive sleep apnea.

8.  GERD.



PAST SURGICAL HISTORY:

1.  Cardiac catheterization done 12/2019 revealed nonischemic cardiomyopathy 

with a left ventricular ejection fraction of 25-30%.

2.  Repeat 2D echo, however, revealed left ventricular ejection fraction of 

55-60%.  This was done 10/2021.



ALLERGIES:  No known drug allergies.



MEDICATIONS:  The patient is noncompliant to home medications, however, he is 

supposed to be on sotalol 80 mg p.o. b.i.d., Eliquis 5 mg p.o. b.i.d., Lasix 40 

mg p.o. every day.



SOCIAL HISTORY:  He uses alcohol socially.  Denies illicit drug use.  Admits to 

tobacco abuse.



FAMILY HISTORY:  He admits to family history of premature coronary artery 

disease, but denies sudden cardiac death.



REVIEW OF SYSTEMS:  As per HPI and as per previous records.  All systems are 

reviewed and negative for interval change.



PHYSICAL EXAMINATION:

VITAL SIGNS:  Blood pressure is 110/82, respiratory rate is 24, pulse is 109, 

temperature is 97.9, pulse oximetry is 96% on room air.

GENERAL:  He is in no apparent distress, alert and oriented x3.

HEENT:  Normocephalic, atraumatic.  Extraocular muscles intact.  Pupils equally 

round, reactive to light and accommodation.

CARDIAC:  S1, S2 irregularly irregular.  No gallops, murmurs, rubs, or clicks.

LUNGS:  Clear to auscultation bilaterally.  No wheezing, rhonchi or rales.

ABDOMEN:  Soft, nontender, nondistended, obese.  Positive bowel sounds in all 4 

quadrants.

EXTREMITIES:  No cyanosis, no clubbing, +2 pitting edema bilaterally.

NEUROLOGIC:  No neurological deficits.  Sensation is intact.



IMPRESSION:

1.  Acute decompensated heart failure secondary to unknown etiology at this 

time.

2.  Atrial fibrillation with rapid ventricular response.

3.  History of nonischemic dilated cardiomyopathy.

4.  Left ventricular ejection fraction of 55-60% on 2D echo done 10/2021.

5.  Left heart catheterization done 12/2019 revealed nonobstructive coronary 

artery disease.

6.  Hypertension.

7.  Anxiety/depression.

8.  Chronic congestive heart failure.

9.  Hyperlipidemia.

10.  Obstructive sleep apnea.

11.  Gastroesophageal reflux disease.

12.  Morbid obesity.

13.  Family history of premature coronary artery disease.

14.  Tobacco abuse.



RECOMMENDATIONS:  This is a 41-year-old male who presented to the Emergency Room

with progressively worsening shortness of breath with orthopnea and paroxysmal 

nocturnal dyspnea.  He was also noted to be in atrial fibrillation with rapid 

ventricular response.  The patient has a long history of poor medication 

compliance as well as poor compliance to follow up in the office setting.  He 

states that he ran out of his meds about 2-3 weeks ago.  I am going to restart 

him on sotalol 80 mg p.o. b.i.d.  He is currently on amiodarone drip.  I would 

wean him off amiodarone drip.  I would also restart him on oral anticoagulation.

 He will be started on Eliquis 5 mg p.o. b.i.d.  I would recommend strict I's 

and O's, daily weights, fluid as well as sodium restriction.  He will be started

on Lasix 40 mg IV every day as well as lisinopril 20 mg p.o. every day.  A 2D 

echocardiography is not available at the hospital at this time.  Most recent 

echo done 10/2021 revealed left ventricular ejection fraction of 55-60%.  High 

sensitive troponin is negative x2.  He denies any angina.  His heart rate goal 

is less than 120 beats per minute.  If his heart rate is controlled by the 

morning, he can be discharged home from my standpoint to follow up with me in 

the office in 2-3 weeks.  I have written out a prescription for all his cardiac 

medications.  No invasive cardiac workup is planned at this time.









Melia LILLY D.O. DR: KA/MUK TID: [id number] RECEIPT: [id number]

DD: 06/02/2022 10:08 PM

DT: 06/03/2022 08:12 PM

## 2022-06-03 NOTE — PRM.PN
Subjective


Subjective


Date:  Odell 3, 2022


Time:  09:00


Subjective


Patient is feeling somewhat better this morning.  Less shortness of breath.  

Patient was seen by cardiologist and was started on sotalol, oral anticoagulant,

and lisinopril.Creatinine went up to 1.8 today.


Patient History:  


FH: esophageal cancer


  32 MOTHER, 


FH: suicide


  33 FATHER, , Age:30's - 40





VTE


VTE Risk Total Score:  >5


VTE Risk Score


VTE Risk:


Score 0-1 = Low Risk


(Aggressive mobilization; early ambulation; no VTE prophylaxis required)


Score 2: Moderate Risk


(Intermittent/Pneumatic Compression Device OR Lovenox/Heparin/Coumadin)


Score 3-4: High Risk


(Intermittent/Pneumatic Compression Device AND Lovenox/Heparin/Coumadin)


Score  > or =5: Highest Risk


(Intermittent/Pneumatic Compression Device AND Lovenox/Heparin/Coumadin)


Antico:Hep/LMWH/Coum/Xarelto:  Yes





Review of Systems


Respiratory:  Shortness of breath, SOB with excertion, Other (Edema)


Cardiovascular:  Chest Pain, Palpitations, Other


Gastrointestinal:  Other (Abdominal distention)


Musculoskeletal:  other (Edema)


Skin:  Other


Allergies:  


Coded Allergies:  


     No Known Allergies (Unverified , 16)


Scheduled


Diltiazem Hcl (Cardizem), 60 MG PO TID


Lisinopril (Lisinopril), 20 MG PO DAILY


Omeprazole Magnesium (Prilosec Otc), 1 TAB PO QD


Sotalol Hcl (Sotalol), 0.5 TAB PO BID


Warfarin Sodium (Warfarin Sodium), 10 MG PO DAILY24





Objective


Vitals and I/O





Vital Sign - Last 24 Hours








 22





 11:24 11:55 12:49 13:28


 


Temp 97.6 97.6 97.6 


 


Pulse 132 143 123 135


 


Resp 24 20 20 37


 


B/P (MAP) 142/90 (107) 120/98 (105) 128/91 (103) 132/106 (115)


 


Pulse Ox 99 99 97 99


 


O2 Delivery Room Air* Room Air* Room Air* Room Air*


 


O2 Flow Rate 0 0 0 0


 


FiO2 21 21 21 21


 


    





 22





 13:30 13:32 13:45 14:00


 


Pulse 136 125 122 113


 


Resp 39 15 21 27


 


B/P (MAP)  134/73 (93) 121/95 (104) 114/81 (92)


 


Pulse Ox 98 99 97 97


 


O2 Delivery Room Air* Room Air* Room Air* Room Air*


 


O2 Flow Rate 0 0 0 0


 


FiO2 21 21 21 21





 22





 14:15 14:30 14:45 14:46


 


Pulse 119 119 125 


 


Resp 27 20 18 


 


B/P (MAP) 119/90 (100) 109/87 (94) 134/96 (109) 


 


Pulse Ox 99 98 96 


 


O2 Delivery Room Air* Room Air* Room Air* Room Air


 


O2 Flow Rate 0 0 0 


 


FiO2 22





 15:00 15:15 15:26 16:00


 


Pulse 136 130 125 


 


Resp 26 16 22 


 


B/P (MAP) 101/75 (84) 98/52 (67) 131/98 (109) 


 


Pulse Ox 97 98 99 


 


O2 Delivery Room Air* Room Air* Room Air* Room Air


 


O2 Flow Rate 0 0 0 


 


FiO2 21 22





 16:00 16:01 16:15 16:30


 


Pulse 121 117 113 122


 


Resp 21 12 11 30


 


B/P (MAP)  106/87 (93) 120/77 (91) 


 


Pulse Ox 98 98  97


 


O2 Delivery Room Air* Room Air* Room Air* Room Air*


 


O2 Flow Rate 0 0 0 0


 


FiO2 21 22





 16:31 16:45 17:00 17:15


 


Pulse 119 116 134 113


 


Resp 22 18 11 16


 


B/P (MAP) 113/72 (86) 91/47 (62) 93/45 (61) 113/75 (88)


 


Pulse Ox 97 98 99 99


 


O2 Delivery Room Air* Room Air* Room Air* Room Air*


 


O2 Flow Rate 0 0 0 0


 


FiO2 21 22





 17:20 17:22 17:22 17:30


 


Pulse 103  109 106


 


Resp 23  28 14


 


B/P (MAP) 113/67 (82) 114/74 114/74 (87) 


 


Pulse Ox 91   94


 


O2 Delivery Room Air*  Room Air* Room Air*


 


O2 Flow Rate 0  0 0


 


FiO2 22





 17:32 17:45 17:47 17:52


 


Pulse 121 113 118 110


 


Resp 20 18 11 16


 


B/P (MAP) 107/84 (92)   


 


Pulse Ox 92 92 90 92


 


O2 Delivery Room Air* Room Air* Room Air* Room Air*


 


O2 Flow Rate 0 0 0 0


 


FiO2 21 22





 17:54 17:56 18:00 18:02


 


Pulse 120 112 115 117


 


Resp 34 26 23 21


 


B/P (MAP)  97/65 (76)  136/64 (88)


 


Pulse Ox 96 95 94 97


 


O2 Delivery Room Air* Room Air* Room Air* Room Air*


 


O2 Flow Rate 0 0 0 0


 


FiO2 22





 18:05 18:45 19:00 19:15


 


Pulse 128 124 115 110


 


Resp 30 22 9 23


 


B/P (MAP) 111/75 (87) 101/83 (89) 86/71 (76) 104/64 (77)


 


Pulse Ox 97   100


 


O2 Delivery Room Air* Room Air* Room Air* Room Air*


 


O2 Flow Rate 0 0 0 0


 


FiO2 22





 19:30 19:45 20:02 20:15


 


Pulse 114 111 119 


 


Resp 21 35 33 


 


B/P (MAP) 136/109 (118) 136/92 (107) 102/84 (90) 


 


O2 Delivery Room Air* Room Air* Room Air* Room Air


 


O2 Flow Rate 0 0 0 


 


FiO2 22





 20:15 20:30 20:45 21:00


 


Temp  97.9  


 


Pulse 114 112 109 122


 


Resp 21 56 24 25


 


B/P (MAP) 109/88 (95) 112/56 (74) 110/82 (91) 101/82 (88)


 


Pulse Ox 96   94


 


O2 Delivery Room Air* Room Air* Room Air* Room Air*


 


O2 Flow Rate 0 0 0 0


 


FiO2 21 21 21 21


 


    





 22





 21:16 21:40 21:46 22:34


 


Pulse 121 120 116 127


 


Resp 13 10 22 24


 


B/P (MAP) 105/83 (90) 111/70 (84) 105/64 (78) 111/85 (94)


 


Pulse Ox   93 


 


O2 Delivery Room Air* Room Air* Room Air* Nasal Cannula*


 


O2 Flow Rate 0 0 0 2


 


FiO2 21  21 28





 6/2/22 6/2/22 6/2/22 6/3/22





 23:22 23:30 23:56 00:02


 


Temp   97.7 


 


Pulse  115 110 116


 


Resp  22 14 25


 


B/P (MAP)   112/71 (85) 


 


Pulse Ox  87 95 87


 


O2 Delivery Nasal Cannula Nasal Cannula* Nasal Cannula* Nasal Cannula*


 


O2 Flow Rate 2.00 2 2 2


 


FiO2  28 28 28


 


    





 6/3/22 6/3/22 6/3/22 6/3/22





 00:45 01:00 01:03 01:45


 


Pulse 130 133 135 109


 


Resp 17 37 18 24


 


B/P (MAP)  173/92 (119) 112/73 (86) 


 


Pulse Ox 93 87  90


 


O2 Delivery Nasal Cannula* Nasal Cannula* Nasal Cannula* Nasal Cannula*


 


O2 Flow Rate 2 2 2 2


 


FiO2 28 28 28 28





 6/3/22 6/3/22 6/3/22 6/3/22





 02:02 02:15 02:30 02:45


 


Pulse 113 112 101 99


 


Resp 22 32 15 


 


B/P (MAP) 113/60 (77)   


 


Pulse Ox 85 93 88 89


 


O2 Delivery Nasal Cannula* Nasal Cannula* Nasal Cannula* Nasal Cannula*


 


O2 Flow Rate 2 2 2 2


 


FiO2 28 28 28 28





 6/3/22 6/3/22 6/3/22 6/3/22





 03:00 03:15 03:30 03:40


 


Pulse 101 111 111 


 


Resp 21 26 18 


 


B/P (MAP) 128/93 (105)   


 


Pulse Ox 88 89 78 


 


O2 Delivery Nasal Cannula* Nasal Cannula* Nasal Cannula* Nasal Cannula


 


O2 Flow Rate 3 3 3 3.00


 


FiO2 32 32 32 





 6/3/22 6/3/22 6/3/22 6/3/22





 03:45 04:00 04:30 04:45


 


Temp  97.8  


 


Pulse 109 97 109 106


 


Resp 15 19 25 20


 


B/P (MAP)  147/115 (126)  


 


Pulse Ox 85  94 96


 


O2 Delivery Nasal Cannula* Nasal Cannula* Nasal Cannula* Nasal Cannula*


 


O2 Flow Rate 3 3 3 3


 


FiO2 32 32 32 32


 


    





 6/3/22 6/3/22 6/3/22 6/3/22





 05:00 05:15 05:30 05:45


 


Pulse 94 103 93 96


 


Resp 24 34 23 27


 


B/P (MAP) 107/71 (83)   


 


Pulse Ox 87 93 81 92


 


O2 Delivery Nasal Cannula* Nasal Cannula* Nasal Cannula* Nasal Cannula*


 


O2 Flow Rate 3 3 3 3


 


FiO2 32 32 32 32





 6/3/22 6/3/22 6/3/22 6/3/22





 06:00 06:15 06:30 06:45


 


Pulse 100 112 113 89


 


Resp 21 27 32 15


 


Pulse Ox 87 89 93 96


 


O2 Delivery Nasal Cannula* Nasal Cannula* Nasal Cannula* Nasal Cannula*


 


O2 Flow Rate 3 3 3 3


 


FiO2 32 32 32 32





 6/3/22 6/3/22 6/3/22 6/3/22





 07:00 07:01 07:15 07:30


 


Pulse 81 90 92 92


 


Resp  13 6 


 


B/P (MAP)  105/75 (85)  


 


Pulse Ox 95 100 94 100


 


O2 Delivery Nasal Cannula* Nasal Cannula* Nasal Cannula* Nasal Cannula*


 


O2 Flow Rate 3 3 3 3


 


FiO2 32 32 32 32





 6/3/22 6/3/22 6/3/22 6/3/22





 07:45 08:00 08:00 08:01


 


Pulse 112 99  103


 


Resp 30 7  15


 


B/P (MAP)    119/71 (87)


 


Pulse Ox 96 99  99


 


O2 Delivery Nasal Cannula* Nasal Cannula* Nasal Cannula Nasal Cannula*


 


O2 Flow Rate 2 2 3.00 2


 


FiO2 28 28  28





 6/3/22 6/3/22 6/3/22 6/3/22





 08:15 08:30 08:45 09:00


 


Pulse 93 96 112 104


 


Resp  20  25


 


B/P (MAP)    104/76 (85)


 


Pulse Ox 96 87 94 95


 


O2 Delivery Nasal Cannula* Nasal Cannula* Nasal Cannula* Nasal Cannula*


 


O2 Flow Rate 2 2 2 2


 


FiO2 28 28 28 28





 6/3/22 6/3/22 6/3/22 6/3/22





 09:15 09:21 09:30 09:33


 


Pulse 95  97 93


 


Resp 6  18 18


 


B/P (MAP)  104/76  


 


Pulse Ox 98  95 98


 


O2 Delivery Nasal Cannula*  Nasal Cannula* Nasal Cannula*


 


O2 Flow Rate 2  2 3


 


FiO2 28  28 32





 6/3/22 6/3/22 6/3/22 6/3/22





 09:45 10:00 10:15 10:30


 


Pulse 93 98 92 93


 


Resp 23 20 19 29


 


B/P (MAP)  115/77 (90)  


 


Pulse Ox 96 90 99 95


 


O2 Delivery Nasal Cannula* Nasal Cannula* Nasal Cannula* Nasal Cannula*


 


O2 Flow Rate 2 2 2 2


 


FiO2 28 28 28 28





 6/3/22   





 10:45   


 


Pulse 117   


 


Resp 19   


 


Pulse Ox 95   


 


O2 Delivery Nasal Cannula*   


 


O2 Flow Rate 2   


 


FiO2 28   














Intake and Output 


 


 6/3/22





 07:00


 


Intake Total 1748 ml


 


Output Total 1000 ml


 


Balance 748 ml








General:  Alert, Oriented X3, moderate distress, Other (Orthopneic)


HEENT:  Atraumatic, PERRLA


Lungs:  Other (Bibasilar crackles, respirations labored)


Heart:  Other (Irregular irregular tachycardic)


Abdomen:  No tenderness, Other (Distended)


Extremities:  No clubbing, No cyanosis, Other (Bilateral edema)


Neuro:  Normal speech, Normal tone


Psych/Mental Status:  Mental status NL, Mood NL


All Results(Lab/Rad)





Laboratory Tests








Test


 22


12:14 22


16:16 6/3/22


04:05


 


Urine Opiates Screen NEGATIVE   


 


Urine Methadone Screen NEGATIVE   


 


Urine Barbiturates Screen NEGATIVE   


 


Urine Phencyclidine Screen NEGATIVE   


 


Ur


Amphetamine/Methamphetamine NEGATIVE 


 


 





 


Urine MDMA Screen (Ecstasy) NEGATIVE   


 


Urine Benzodiazepines Screen NEGATIVE   


 


Urine Cocaine Metabolite


Screen NEGATIVE 


 


 





 


Ur Tetrahydrocannabinol (THC)


Scrn PRESUMPTIVE


POSITIVE 


 





 


Troponin I High Sensitivity  27 ng/L  


 


White Blood Count   9.8 10^3/uL 


 


Red Blood Count   5.02 10^6/uL 


 


Hemoglobin   16.4 g/dL 


 


Hematocrit   50.7 % 


 


Mean Corpuscular Volume   101.0 fL 


 


Mean Corpuscular Hemoglobin   32.7 pg 


 


Mean Corpuscular Hemoglobin


Concent 


 


 32.3 g/dL 





 


Red Cell Distribution Width   15.4 % 


 


Platelet Count   167 10^3/uL 


 


Mean Platelet Volume   10.7 fL 


 


Neutrophils (%) (Auto)   72.1 % 


 


Lymphocytes (%) (Auto)   17.9 % 


 


Monocytes (%) (Auto)   9.3 % 


 


Neutrophils # (Auto)   7.1 10^3/uL 


 


Lymphocytes # (Auto)   1.75 10^3/uL1 


 


Monocytes # (Auto)   0.9 10^3/uL 


 


Absolute Immature Granulocyte


(auto 


 


 0.01 10^3 u/L 





 


Absolute Eosinophils (auto)   0.0 10^3/uL 


 


Immature Granulocytes %   0.10 % 


 


Eosinophils %   0.2 % 


 


Basophils %   0.5 % 


 


Basophils #   0.1 10^3/uL 


 


Sodium Level   133 mmol/L 


 


Potassium Level   4.8 mmol/L 


 


Chloride Level   103.0 mmol/L 


 


Carbon Dioxide Level   17.7 mmol/L 


 


Anion Gap   17.1 


 


Blood Urea Nitrogen   21 mg/dL 


 


Creatinine   1.81 mg/dL 


 


Estimated GFR (


American) 


 


 50.2 





 


Est GFR (CKD-EPI)(Non-Afr


American) 


 


 41.5 





 


BUN/Creatinine Ratio   11.0 


 


Glucose Level   111 mg/dL 


 


Calcium Level   8.8 mg/dL 


 


Total Bilirubin   1.8 mg/dL 


 


Aspartate Amino Transf


(AST/SGOT) 


 


 39 U/L 





 


Alanine Aminotransferase


(ALT/SGPT) 


 


 20 U/L 





 


Alkaline Phosphatase   102 U/L 


 


Total Protein   6.2 g/dL 


 


Albumin   2.8 g/dL 


 


Globulin   3.4 


 


Albumin/Globulin Ratio   0.823 








Current Medications








 Medications


  (Trade)  Dose


 Ordered  Sig/Foreign


 Route


 PRN Reason  Start Time


 Stop Time Status Last Admin


Dose Admin


 


 Diltiazem HCl


  (Cardizem)  20 mg  STAT  STAT


 IV


   22 10:51


 22 10:53 DC 22 11:04





 


 Furosemide


  (Lasix)  40 mg  STAT  STAT


 IV


   22 10:51


 22 10:53 DC 22 11:07





 


 Albuterol Sulfate


  (Ventolin)  2.5 mg  OT  ONCE


 IH


   22 11:00


 22 11:01 DC 22 11:12





 


 Furosemide


  (Lasix)  40 mg  STK-MED ONCE


 .ROUTE


   22 11:01


 22 11:02 DC  





 


 Diltiazem HCl


  (Cardizem)  125 mg  STK-MED ONCE


 .ROUTE


   22 11:01


 22 11:02 DC  





 


 Albuterol Sulfate


  (Ventolin)  2.5 mg  STK-MED ONCE


 IH


   22 11:06


 22 11:06 DC  





 


 Amiodarone HCl


  (Cordarone)  150 mg  OT  ONCE


 IV


   22 11:30


 22 11:31 DC 22 11:19





 


 Amiodarone HCl


  (Cordarone)  150 mg  STK-MED ONCE


 .ROUTE


   22 11:15


 22 11:15 DC  





 


 Amiodarone HCL/


 Dextrose  200 ml @ 0


 mls/hr  IV


 


   22 12:00


 22 11:59  22 21:19





 


 Amiodarone HCL/


 Dextrose  200 ml @ ud  STK-MED ONCE


 IV


   22 11:44


 22 11:44 DC  





 


 Enoxaparin Sodium


  (Lovenox)  100 mg  STAT  STAT


 SQ


   22 11:57


 22 12:02 DC 22 12:06





 


 Enoxaparin Sodium


  (Lovenox)  100 mg  STK-MED ONCE


 SQ


   22 12:04


 22 12:04 DC  





 


 Acetaminophen


  (Tylenol)  1,000 mg  Q6H  PRN


 PO


 PAIN 1 - 3  22 14:00


 22 13:59  6/3/22 00:49





 


 Pantoprazole


 Sodium


  (Protonix)  40 mg  DAILY


 PO


   6/3/22 09:00


 7/3/22 08:59  6/3/22 09:21





 


 Enoxaparin Sodium


  (Lovenox)  120 mg  Q12H


 SQ


   22 14:00


 22 14:04 DC  





 


 Sotalol HCl


  (Betapace)  80 mg  STAT  STAT


 PO


   22 14:00


 22 14:31 DC 22 14:16





 


 Sotalol HCl


  (Betapace)  80 mg  BID


 PO


   22 21:00


 6/3/22 01:07 DC 22 20:29





 


 Furosemide


  (Lasix)  40 mg  STAT  STAT


 IV


   22 17:13


 22 20:33 DC 22 17:22





 


 Furosemide


  (Lasix)  40 mg  DAILY


 IV


   6/3/22 09:00


 7/3/22 08:59  6/3/22 09:21





 


 Lisinopril


  (Zestril)  20 mg  DAILY


 PO


   6/3/22 09:00


 7/3/22 08:59   





 


 Calcium Carbonate/


 Glycine


  (Tums)  500 mg  Q4HR  PRN


 PO


 HEARTBURN  22 21:30


 6/3/22 11:00 DC 22 21:19





 


 Ondansetron HCl


  (Zofran)  4 mg  Q6HR  PRN


 IV


 NAUSEA / VOMITING  22 22:00


 22 21:59  22 22:24





 


 Sotalol HCl


  (Betapace)  120 mg  BID


 PO


   6/3/22 09:00


 7/3/22 08:59  6/3/22 09:22





 


 Sotalol HCl


  (Betapace)  40 mg  STAT  STAT


 PO


   6/3/22 01:05


 6/3/22 01:51 DC 6/3/22 01:10














Assessment/Plan


41-year-old male with history of atrial fibrillation comes here with several 

weeks of worsening leg edema as well as progressive shortness of breath.  The 

edema is in both legs.  The shortness of breath is progressive and worse with 

activity and laying down.  He is got history of A. fib but unfortunately not 

taking any medication right now.  No fever and no cough.  No chest pain.  The 

patient has seen one of the local cardiologist in the past.  Shortness of breath

described as 8 out of 10 in intensity.





And work-up in the emergency room patient was found to be in atrial fibrillation

with RVR.  Patient was also found to have significant leg edema and, short of 

breath, orthopneic.Denies chest pain, nausea vomiting fever or chills.Initially 

patient was given diltiazem, remained in A. fib with RVR.  Patient was started 

on IV amiodarone drip.  Given IV Lasix.  Placed on supplemental oxygen.  Patient

admitted to intensive care unit for further management.Cardiologist consulted.





Plan


Patient started on sotalol


Blood pressure is soft, will hold lisinopril for now


Continue IV diuresis


Continue to monitor kidney function urine output


Monitor and correct electrolytes


Venous Doppler of lower extremities


Appreciate cardiology input and help


GI and DVT prophylaxis


Case discussed with ICU RN


Problems:  


(1) Atrial fibrillation with RVR


ICD Code:  I48.91 - Unspecified atrial fibrillation


SNOMED:  014802285917314


(2) CAD (coronary artery disease)


ICD Code:  I25.10 - Atherosclerotic heart disease of native coronary artery 

without angina pectoris


SNOMED:  71019263


(3) Acute exacerbation of congestive heart failure


ICD Code:  I50.9 - Heart failure, unspecified


SNOMED:  009881254, 40273430966241


(4) Noncompliance with medications


ICD Code:  Z91.14 - Patient's other noncompliance with medication regimen


SNOMED:  336727385


(5) History of COVID-19


ICD Code:  Z86.16 - Personal history of COVID-19


SNOMED:  585381294, [id number]











HERSON JAMES MD        Odell 3, 2022 11:26

## 2022-06-03 NOTE — DIREP
PROCEDURE:US VENOUS IMAGING BILAT

 

COMPARISON:[facility], US, US VENOUS IMAGING BILAT, 10/07/2021, 

09:10 AM.

 

INDICATIONS:bilateral lower extremity edema

 

TECHNIQUE:The lower extremities were evaluated utilizing gray scale images 

with segmental compression, color Doppler, and spectral Doppler with 

respiratory variation and augmentation.

 

FINDINGS:

RIGHT

Common femoral vein:Patent

Profunda femoris: Occlusive thrombus

Superficial femoral vein:Occlusive thrombus

Popliteal vein:Occlusive thrombus

Posterior tibial vein:Patent

Peroneal vein: Patent

Greater saphenous vein:Patent

 

 

LEFT

Common femoral vein:Patent

Profunda femoris: Patent

Superficial femoral vein:Patent

Popliteal vein:Partially occlusive thrombus

Posterior tibial vein:Patent

Peroneal vein: Patent

Greater saphenous vein:Patent

 

 

CONCLUSION:

1.  Right femoral-popliteal occlusive DVT.

2.  Left popliteal partially occlusive DVT.

 

 

Dictated by: Esther Mai MD on 06/03/2022 at 11:53 AM     

Electronically Signed By: Esther Mai MD on 06/03/2022 at 11:56 AM

## 2022-06-03 NOTE — NUR
Decreased from 3lpm to 2lpm nc; spo2 98%; pat does not wear 02 at home

-------------------------------------------------------------------------------

Addendum: 06/03/22 at 0939 by Tegan Baker RRT, Contract RT

-------------------------------------------------------------------------------

Amended: Links added.

## 2022-06-04 VITALS — DIASTOLIC BLOOD PRESSURE: 74 MMHG | SYSTOLIC BLOOD PRESSURE: 126 MMHG

## 2022-06-04 VITALS — SYSTOLIC BLOOD PRESSURE: 116 MMHG | DIASTOLIC BLOOD PRESSURE: 76 MMHG

## 2022-06-04 VITALS — SYSTOLIC BLOOD PRESSURE: 126 MMHG | DIASTOLIC BLOOD PRESSURE: 75 MMHG

## 2022-06-04 VITALS — SYSTOLIC BLOOD PRESSURE: 124 MMHG | DIASTOLIC BLOOD PRESSURE: 93 MMHG

## 2022-06-04 VITALS — DIASTOLIC BLOOD PRESSURE: 77 MMHG | SYSTOLIC BLOOD PRESSURE: 111 MMHG

## 2022-06-04 LAB
BASOPHILS # BLD AUTO: 0.1 10^3/UL (ref 0–0.1)
BASOPHILS NFR BLD AUTO: 1 % (ref 0–0.2)
CO2 BLDA-SCNC: 24.7 MMOL/L (ref 20–32)
EOSINOPHIL # BLD AUTO: 0.1 10^3/UL (ref 0–0.2)
EOSINOPHIL NFR BLD AUTO: 1.7 % (ref 0–5)
ERYTHROCYTE [DISTWIDTH] IN BLOOD BY AUTOMATED COUNT: 15.2 % (ref 11.5–14.5)
GLUCOSE PRE 100 G GLC PO SERPL-MCNC: 101 MG/DL (ref 70–110)
LYMPHOCYTES # BLD AUTO: 2.38 10^3/UL1 (ref 1–4.8)
LYMPHOCYTES NFR BLD AUTO: 30.5 % (ref 24–44)
MCH RBC QN AUTO: 32.6 PG (ref 26–34)
MONOCYTES # BLD AUTO: 0.8 10^3/UL (ref 0.3–0.8)
MONOCYTES NFR BLD AUTO: 10.2 % (ref 5–12)
NEUTROPHILS # BLD AUTO: 4.4 10^3/UL (ref 1.8–7.7)
NEUTROPHILS NFR BLD AUTO: 56.3 % (ref 41–85)
PLATELET # BLD AUTO: 171 10^3/UL (ref 150–400)

## 2022-06-04 RX ADMIN — APIXABAN SCH MG: 5 TABLET, FILM COATED ORAL at 09:00

## 2022-06-04 RX ADMIN — SOTALOL HYDROCHLORIDE SCH MG: 80 TABLET ORAL at 20:45

## 2022-06-04 RX ADMIN — SOTALOL HYDROCHLORIDE SCH MG: 80 TABLET ORAL at 09:00

## 2022-06-04 RX ADMIN — FUROSEMIDE SCH MG: 10 INJECTION INTRAVENOUS at 09:00

## 2022-06-04 RX ADMIN — PANTOPRAZOLE SODIUM SCH MG: 40 TABLET, DELAYED RELEASE ORAL at 09:00

## 2022-06-04 RX ADMIN — APIXABAN SCH MG: 5 TABLET, FILM COATED ORAL at 20:44

## 2022-06-04 NOTE — DIET.OP
Nutrition Asmt/Malnutrit 2-17


Actual Date of Review:  2022


Actual Time Reviewed Asmt:  10:01


Nutritional Screening:  Malnutr/Diet Consult


Diagnosis:  Atrial Fibrilaiton w/RVR, Acure Exacerbation of CHF, CAD


Pertinent Medical Hx/Surgical:  


GERD, Sleep Apnea, Hyperlipidemia, HTN


Subjective Information:  


40 yo m, presented w/worsening leg edema last two weeks, SOB, is


noncompliant with meds, has negative fluid balance per MD notes


Current Diet Order/Nutrition S:  Cardiac, 1500 ml fluid restriction


Patient /S.O:  Not Indicated


Pertinent Meds


Protonix, Tylenol, Lasix, Zofran, Betapace


Pertinent Labs


Hgb 16.0, Hct 49.2, Na+ 14, K+ 4.3, BUN 29H, Cr 2.02, Glu 101, Ca+ 8.5, TP 6.0L,

Alb 2.9L


Height (Inches):  69


Current Weight:  276


%IBW:  173


Recent Weight Change:  No


Weight Status:  Morbid Obese


GI Comments


Dx of GERD


Cultural/Ethnic/Confucianist Michelle:  


unknown


Usual Diet at Home:  unable to obtain


Skin Integrity/Comment:  Yes (skin intact, significant BLE edema per MD notes)


Current %PO:  Good(%) (88%  4 meals)


BEE in Kcals:  Use Current Weight


Calories/Kcals/K-14


Kcals Calculated:  3538-8751


Protein:  Use Current Weight


Protein g/k.0-1.3


Protein Calculated:  125-162


Fluid: ml:  0631-2165 or per MD order current 1500 ml fluid restriction


Nutritional Problem:  Nutr. Problems Present


Problems:  


Overweight/Obesity


Etiology:  


Excessive Energy Intake


Signs/Symptoms:  


BMI above normative for age gender, morbid obesity 40.8


Fluid Accumulation (Severe):  Mod to Severe Retention


Protein-Calorie Malnutrition:  N/A


Is there a minimum of two crit:  No


Malnutrition related to morbid:  BMI>or equal to 40


Malnutrition Related to Morbid:  No


RD Comments:


PO intake good, BLE edema, 1500 ml fluid restriction


Expected Outcomes


stable w/adequate hydration, labs WNL, skin integrity, nutrition needs met 

within 3 days


Serum Albumin g/dl:  2.4-3.0 Moderate)


Malnutrtion/Nutrition Risk Edu:  No


low nutrition risk


RD Follow-Up Date:  2022


MD Notificiation Needed?:  No











GRACIA AG                   2022 10:08

## 2022-06-04 NOTE — PRM.PN
Subjective


Subjective


Date:  2022


Time:  09:00


Subjective


Patient is breathing better this morning.  Heart rate low 100s/high 90s.  

Magnesium is low at 1.7.  Creatinine is trending up 2.02.Patient in negative 

fluid balance.Venous Doppler positive for bilateral DVT.


Patient History:  


FH: esophageal cancer


  32 MOTHER, 


FH: suicide


  33 FATHER, , Age:30's - 40





Review of Systems


Constitutional:  Weakness, Other (fatigue); No: Fever, Sweats


Eyes:  No: Pain


ENT:  No: Ear pain, Nose pain, Throat pain


Respiratory:  SOB with excertion; No: Cough, Shortness of breath, Other


Cardiovascular:  No: Chest Pain, Palpitations, Paroxysmal Noc. Dyspnea, Other


Gastrointestinal:  No: Nausea, Abdominal Pain


Genitourinary:  No Frequency, No Incontinence


Musculoskeletal:  No: neck pain, shoulder pain, arm pain


Neurological:  Weakness


Allergies:  


Coded Allergies:  


     No Known Allergies (Unverified , 16)


Scheduled


Diltiazem Hcl (Cardizem), 60 MG PO TID


Lisinopril (Lisinopril), 20 MG PO DAILY


Omeprazole Magnesium (Prilosec Otc), 1 TAB PO QD


Sotalol Hcl (Sotalol), 0.5 TAB PO BID


Warfarin Sodium (Warfarin Sodium), 10 MG PO DAILY24





Objective


Vitals and I/O





Vital Sign - Last 24 Hours








 6/3/22 6/3/22 6/3/22 6/3/22





 09:30 09:33 09:45 10:00


 


Pulse 97 93 93 98


 


Resp 18 18 23 20


 


B/P (MAP)    115/77 (90)


 


Pulse Ox 95 98 96 90


 


O2 Delivery Nasal Cannula* Nasal Cannula* Nasal Cannula* Nasal Cannula*


 


O2 Flow Rate 2 3 2 2


 


FiO2 28 32 28 28





 6/3/22 6/3/22 6/3/22 6/3/22





 10:15 10:30 10:45 11:00


 


Pulse 92 93 117 102


 


Resp 19 29 19 13


 


B/P (MAP)    105/69 (81)


 


Pulse Ox 99 95 95 96


 


O2 Delivery Nasal Cannula* Nasal Cannula* Nasal Cannula* Nasal Cannula*


 


O2 Flow Rate 2 2 2 2


 


FiO2 28 28 28 28





 6/3/22 6/3/22 6/3/22 6/3/22





 11:15 11:30 11:45 12:00


 


Pulse 97 99 113 110


 


Resp 19 11 19 50


 


B/P (MAP)    115/65 (82)


 


Pulse Ox 96 96 96 94


 


O2 Delivery Nasal Cannula* Nasal Cannula* Nasal Cannula* Nasal Cannula*


 


O2 Flow Rate 2 2 2 2


 


FiO2 28 28 28 28





 6/3/22 6/3/22 6/3/22 6/3/22





 12:15 12:30 12:45 13:00


 


Pulse 118 98 90 94


 


Resp 18 20 14 23


 


B/P (MAP)    113/71 (85)


 


Pulse Ox 93 95 96 95


 


O2 Delivery Nasal Cannula* Nasal Cannula* Nasal Cannula* Nasal Cannula*


 


O2 Flow Rate 2 2 2 2


 


FiO2 28 28 28 28





 6/3/22 6/3/22 6/3/22 6/3/22





 13:15 13:30 13:45 13:51


 


Pulse 94 100 106 102


 


Resp 27  16 22


 


Pulse Ox 95 97 91 97


 


O2 Delivery Nasal Cannula* Nasal Cannula* Nasal Cannula* Nasal Cannula*


 


O2 Flow Rate 2 2 2 2


 


FiO2 28  28 28





 6/3/22 6/3/22 6/3/22 6/3/22





 14:00 14:01 14:15 14:30


 


Pulse 99 102 101 86


 


Resp 19 26 27 19


 


B/P (MAP)  120/71 (87)  


 


Pulse Ox 91 92 88 95


 


O2 Delivery Nasal Cannula* Nasal Cannula* Nasal Cannula* Nasal Cannula*


 


O2 Flow Rate 2 2 2 2


 


FiO2 28 28 28 28





 6/3/22 6/3/22 6/3/22 6/3/22





 14:45 15:00 15:02 15:15


 


Temp 97.5   


 


Pulse 90 83 95 99


 


Resp 19 25 8 10


 


Pulse Ox 82   


 


O2 Delivery Nasal Cannula* Nasal Cannula* Nasal Cannula* Nasal Cannula*


 


O2 Flow Rate 2 2 2 2


 


FiO2 28 28 28 28


 


    





 6/3/22 6/3/22 6/3/22 6/3/22





 15:30 15:45 16:00 16:02


 


Pulse 106 96 96 91


 


Resp 28 19 28 20


 


O2 Delivery Nasal Cannula* Nasal Cannula* Nasal Cannula* Nasal Cannula*


 


O2 Flow Rate 2 2 2 2


 


FiO2 28 28 28 28





 6/3/22 6/3/22 6/3/22 6/3/22





 16:15 16:30 19:15 19:53


 


Temp   98.0 


 


Pulse 90 92 90 92


 


Resp 17 16 18 16


 


B/P (MAP)   124/89 (101) 


 


Pulse Ox   99 99


 


O2 Delivery Nasal Cannula* Nasal Cannula* Nasal Cannula* Nasal Cannula*


 


O2 Flow Rate 2 2 2 3


 


FiO2 28 28 28 32


 


    





 6/3/22 6/3/22 6/4/22 6/4/22





 20:06 23:31 04:40 08:20


 


Temp  98.2 97.7 


 


Pulse  91 96 71


 


Resp  16 14 18


 


B/P (MAP)  124/90 (101) 126/74 (91) 


 


Pulse Ox  98 98 89


 


O2 Delivery Nasal Cannula Nasal Cannula* Nasal Cannula* Room Air*


 


O2 Flow Rate 2.00 2 2 0


 


FiO2  28 28 21


 


    





 22   





 09:00   


 


B/P (MAP) 117/76   














Intake and Output 


 


 22





 07:00


 


Intake Total 1160 ml


 


Output Total 1700 ml


 


Balance -540 ml








General:  Alert, Oriented X3, No acute distress


HEENT:  Atraumatic, PERRLA, Mucous membr. moist/pink


Neck:  Supple


Lungs:  Normal air movement, Other (02 @ 2LNC ; scattered crackles)


Heart:  Regular rate


Abdomen:  No tenderness


Extremities:  No clubbing, Other (Bilateral lower extremity edema)


Skin:  No rashes


Neuro:  Normal speech, Normal tone


Psych/Mental Status:  Mental status NL, Mood NL


All Results(Lab/Rad)





Laboratory Tests








Test


 22


12:14 22


16:16 6/3/22


04:05


 


Urine Opiates Screen NEGATIVE   


 


Urine Methadone Screen NEGATIVE   


 


Urine Barbiturates Screen NEGATIVE   


 


Urine Phencyclidine Screen NEGATIVE   


 


Ur


Amphetamine/Methamphetamine NEGATIVE 


 


 





 


Urine MDMA Screen (Ecstasy) NEGATIVE   


 


Urine Benzodiazepines Screen NEGATIVE   


 


Urine Cocaine Metabolite


Screen NEGATIVE 


 


 





 


Ur Tetrahydrocannabinol (THC)


Scrn PRESUMPTIVE


POSITIVE 


 





 


Troponin I High Sensitivity  27 ng/L  


 


White Blood Count   9.8 10^3/uL 


 


Red Blood Count   5.02 10^6/uL 


 


Hemoglobin   16.4 g/dL 


 


Hematocrit   50.7 % 


 


Mean Corpuscular Volume   101.0 fL 


 


Mean Corpuscular Hemoglobin   32.7 pg 


 


Mean Corpuscular Hemoglobin


Concent 


 


 32.3 g/dL 





 


Red Cell Distribution Width   15.4 % 


 


Platelet Count   167 10^3/uL 


 


Mean Platelet Volume   10.7 fL 


 


Neutrophils (%) (Auto)   72.1 % 


 


Lymphocytes (%) (Auto)   17.9 % 


 


Monocytes (%) (Auto)   9.3 % 


 


Neutrophils # (Auto)   7.1 10^3/uL 


 


Lymphocytes # (Auto)   1.75 10^3/uL1 


 


Monocytes # (Auto)   0.9 10^3/uL 


 


Absolute Immature Granulocyte


(auto 


 


 0.01 10^3 u/L 





 


Absolute Eosinophils (auto)   0.0 10^3/uL 


 


Immature Granulocytes %   0.10 % 


 


Eosinophils %   0.2 % 


 


Basophils %   0.5 % 


 


Basophils #   0.1 10^3/uL 


 


Sodium Level   133 mmol/L 


 


Potassium Level   4.8 mmol/L 


 


Chloride Level   103.0 mmol/L 


 


Carbon Dioxide Level   17.7 mmol/L 


 


Anion Gap   17.1 


 


Blood Urea Nitrogen   21 mg/dL 


 


Creatinine   1.81 mg/dL 


 


Estimated GFR (


American) 


 


 50.2 





 


Est GFR (CKD-EPI)(Non-Afr


American) 


 


 41.5 





 


BUN/Creatinine Ratio   11.0 


 


Glucose Level   111 mg/dL 


 


Calcium Level   8.8 mg/dL 


 


Total Bilirubin   1.8 mg/dL 


 


Aspartate Amino Transf


(AST/SGOT) 


 


 39 U/L 





 


Alanine Aminotransferase


(ALT/SGPT) 


 


 20 U/L 





 


Alkaline Phosphatase   102 U/L 


 


Total Protein   6.2 g/dL 


 


Albumin   2.8 g/dL 


 


Globulin   3.4 


 


Albumin/Globulin Ratio   0.823 








Current Medications








 Medications


  (Trade)  Dose


 Ordered  Sig/Foreign


 Route


 PRN Reason  Start Time


 Stop Time Status Last Admin


Dose Admin


 


 Diltiazem HCl


  (Cardizem)  20 mg  STAT  STAT


 IV


   22 10:51


 22 10:53 DC 22 11:04





 


 Furosemide


  (Lasix)  40 mg  STAT  STAT


 IV


   22 10:51


 22 10:53 DC 22 11:07





 


 Albuterol Sulfate


  (Ventolin)  2.5 mg  OT  ONCE


 IH


   22 11:00


 22 11:01 DC 22 11:12





 


 Furosemide


  (Lasix)  40 mg  STK-MED ONCE


 .ROUTE


   22 11:01


 22 11:02 DC  





 


 Diltiazem HCl


  (Cardizem)  125 mg  STK-MED ONCE


 .ROUTE


   22 11:01


 22 11:02 DC  





 


 Albuterol Sulfate


  (Ventolin)  2.5 mg  STK-MED ONCE


 IH


   22 11:06


 22 11:06 DC  





 


 Amiodarone HCl


  (Cordarone)  150 mg  OT  ONCE


 IV


   22 11:30


 22 11:31 DC 22 11:19





 


 Amiodarone HCl


  (Cordarone)  150 mg  STK-MED ONCE


 .ROUTE


   22 11:15


 22 11:15 DC  





 


 Amiodarone HCL/


 Dextrose  200 ml @ 0


 mls/hr  IV


 


   22 12:00


 22 11:59  22 21:19





 


 Amiodarone HCL/


 Dextrose  200 ml @ ud  STK-MED ONCE


 IV


   22 11:44


 22 11:44 DC  





 


 Enoxaparin Sodium


  (Lovenox)  100 mg  STAT  STAT


 SQ


   22 11:57


 22 12:02 DC 22 12:06





 


 Enoxaparin Sodium


  (Lovenox)  100 mg  STK-MED ONCE


 SQ


   22 12:04


 22 12:04 DC  





 


 Acetaminophen


  (Tylenol)  1,000 mg  Q6H  PRN


 PO


 PAIN 1 - 3  22 14:00


 22 13:59  6/3/22 00:49





 


 Pantoprazole


 Sodium


  (Protonix)  40 mg  DAILY


 PO


   6/3/22 09:00


 7/3/22 08:59  6/3/22 09:21





 


 Enoxaparin Sodium


  (Lovenox)  120 mg  Q12H


 SQ


   22 14:00


 22 14:04 DC  





 


 Sotalol HCl


  (Betapace)  80 mg  STAT  STAT


 PO


   22 14:00


 22 14:31 DC 22 14:16





 


 Sotalol HCl


  (Betapace)  80 mg  BID


 PO


   22 21:00


 6/3/22 01:07 DC 22 20:29





 


 Furosemide


  (Lasix)  40 mg  STAT  STAT


 IV


   22 17:13


 22 20:33 DC 22 17:22





 


 Furosemide


  (Lasix)  40 mg  DAILY


 IV


   6/3/22 09:00


 7/3/22 08:59  6/3/22 09:21





 


 Lisinopril


  (Zestril)  20 mg  DAILY


 PO


   6/3/22 09:00


 7/3/22 08:59   





 


 Calcium Carbonate/


 Glycine


  (Tums)  500 mg  Q4HR  PRN


 PO


 HEARTBURN  22 21:30


 6/3/22 11:00 DC 22 21:19





 


 Ondansetron HCl


  (Zofran)  4 mg  Q6HR  PRN


 IV


 NAUSEA / VOMITING  22 22:00


 22 21:59  22 22:24





 


 Sotalol HCl


  (Betapace)  120 mg  BID


 PO


   6/3/22 09:00


 7/3/22 08:59  6/3/22 09:22





 


 Sotalol HCl


  (Betapace)  40 mg  STAT  STAT


 PO


   6/3/22 01:05


 6/3/22 01:51 DC 6/3/22 01:10














Assessment/Plan


Seen by cardiology appreciate input:


1.  Hx of Nonischemic cardiomyopathy 


2.  Chronic atrial fibrillation


3.  Hypertension.


4.  Acute on Chronic systolic heart failure.


5.  Anxiety/depression.


6.  Hyperlipidemia.


7.  Obstructive sleep apnea.


8.  GERD


9.  Non compliance with medications


10. Non compliance with outpatient office visits


11. Tobacco use


12.  Most recent echo done 2021 showed LVEF of 55-60%








Dr. Epperson:





Continue cardiac meds:Lisinopril 20 mg PO daily, Lasix 40 mg IV daily, KCL 20 

meq daily, Sotalol 120 mg PO BID, and Eliquis 5 mg PO BID. 


Heart rate is currently controlled. (Goal is <120bpm)


Fluid restriction to 1.5L/day


Strict I and O's


Daily weights


Na restriction


2D echo is not available at the hospital at this time.


Would benefit from SGLT2 inh and Entresto-however cannot afford meds due to 

financial burden


No invasive w/up planned at this time


Patient agrees to follow up in clinic upon discharge.


Lifestyle modification factors have been strongly advised. 





Plan 22





We will keep the patient an extra night to continue to monitor kidney function. 

Creatinine trending up today.


We will switch IV Lasix to p.o.


Will hold ACE inhibitor


Strict I's and O's


Replace magnesium


Monitor and correct electrolytes


Anticoagulation, Eliquis no invasive work-up planned at this time as per 

cardiologist


Discussed with the patient regarding importance of compliance with his 

medications.


Possible discharge in a.m. if if kidney function/creatinine stabilize And 

clinically stable.


Problems:  


(1) Atrial fibrillation with RVR


ICD Code:  I48.91 - Unspecified atrial fibrillation


SNOMED:  696206280807914


(2) Acute exacerbation of congestive heart failure


ICD Code:  I50.9 - Heart failure, unspecified


SNOMED:  239140542, 38807356462994


(3) Noncompliance with medications


ICD Code:  Z91.14 - Patient's other noncompliance with medication regimen


SNOMED:  249816455


(4) Hypomagnesemia


ICD Code:  E83.42 - Hypomagnesemia


SNOMED:  001805559


(5) DVT (deep venous thrombosis)


ICD Code:  I82.409 - Acute embolism and thrombosis of unspecified deep veins of 

unspecified lower extremity


SNOMED:  507000351











HERSON JAMES MD        2022 09:33

## 2022-06-04 NOTE — NUR
transfer 

pt transferred to room 330 via w/c, no s/s fo distress noted. report given to sherry hampton rn. 
relinquished care of pt.

## 2022-06-05 VITALS — DIASTOLIC BLOOD PRESSURE: 71 MMHG | SYSTOLIC BLOOD PRESSURE: 107 MMHG

## 2022-06-05 VITALS — DIASTOLIC BLOOD PRESSURE: 70 MMHG | SYSTOLIC BLOOD PRESSURE: 101 MMHG

## 2022-06-05 VITALS — DIASTOLIC BLOOD PRESSURE: 78 MMHG | SYSTOLIC BLOOD PRESSURE: 110 MMHG

## 2022-06-05 LAB
CO2 BLDA-SCNC: 26.3 MMOL/L (ref 20–32)
GLUCOSE PRE 100 G GLC PO SERPL-MCNC: 85 MG/DL (ref 70–110)

## 2022-06-05 RX ADMIN — PANTOPRAZOLE SODIUM SCH MG: 40 TABLET, DELAYED RELEASE ORAL at 09:26

## 2022-06-05 RX ADMIN — APIXABAN SCH MG: 5 TABLET, FILM COATED ORAL at 09:26

## 2022-06-05 RX ADMIN — SOTALOL HYDROCHLORIDE SCH MG: 80 TABLET ORAL at 09:25

## 2022-06-05 NOTE — PRM.DC
Discharge Summary


HPI and Diagnostic Evaluation


Pt seen and examined today. He is doing well and is eager to go home. Renal 

function improved today. Plan to f/u with cardiology tomorrow in office and 

discuss ECHO. He will be prescribed medications and will f/u with cardiology 

tomorrow. No acute events overnight.


Patient History:  


FH: esophageal cancer


  32 MOTHER, 


FH: suicide


  33 FATHER, , Age:30's - 40


Allergies:  


Coded Allergies:  


     No Known Allergies (Unverified , 16)


Findings


General:  Alert, Oriented X3, No acute distress


HEENT:  Atraumatic, PERRLA, Mucous membr. moist/pink


Neck:  Supple


Lungs:  Normal air movement, RA


Heart:  tachycardic in 110's, irregularly irregular rhythm


Abdomen:  No tenderness


Extremities:  No clubbing, Other (Bilateral lower extremity edema)


Skin:  No rashes


Neuro:  Normal speech, Normal tone


Psych/Mental Status:  Mental status NL, Mood NL


Scheduled


Apixaban (Eliquis), 5 MG PO BID


Diltiazem Hcl (Cardizem), 60 MG PO TID


Furosemide (Furosemide), 40 MG PO DAILY


Lisinopril (Lisinopril), 20 MG PO DAILY


Omeprazole Magnesium (Prilosec Otc), 1 TAB PO QD


Potassium Chloride (Potassium Chloride), 20 MEQ PO DAILY24


Sotalol Hcl (Sotalol), 0.5 TAB PO BID


Warfarin Sodium (Warfarin Sodium), 10 MG PO DAILY24


Consultations


Cardiology - Dr Epperson


Procedures





Laboratory Tests








Test


 22


00:00 22


10:44 22


12:14 22


16:16


 


SARS-CoV-2 Antigen (Rapid)


 NEGATIVE


(NEGATIVE) 


 


 





 


White Blood Count


 


 8.0 10^3/uL


(4.5-11.0) 


 





 


Red Blood Count


 


 5.09 10^6/uL


(4.50-5.90) 


 





 


Hemoglobin


 


 17.0 g/dL


(13.9-16.3) 


 





 


Hematocrit


 


 51.2 %


(37.0-53.0) 


 





 


Mean Corpuscular Volume


 


 100.6 fL


() 


 





 


Mean Corpuscular Hemoglobin


 


 33.4 pg


(26-34) 


 





 


Mean Corpuscular Hemoglobin


Concent 


 33.2 g/dL


(33-36.5) 


 





 


Red Cell Distribution Width


 


 15.0 %


(11.5-14.5) 


 





 


Platelet Count


 


 176 10^3/uL


(150-400) 


 





 


Mean Platelet Volume


 


 10.2 fL


(7.8-11.0) 


 





 


Neutrophils (%) (Auto)


 


 64.8 %


(41.0-85.0) 


 





 


Lymphocytes (%) (Auto)


 


 24.6 %


(24.0-44.0) 


 





 


Monocytes (%) (Auto)


 


 8.4 %


(5.0-12.0) 


 





 


Neutrophils # (Auto)


 


 5.2 10^3/uL


(1.8-7.7) 


 





 


Lymphocytes # (Auto)


 


 1.97 10^3/uL1


(1.0-4.8) 


 





 


Monocytes # (Auto)


 


 0.7 10^3/uL


(0.3-0.8) 


 





 


Absolute Immature Granulocyte


(auto 


 0.01 10^3 u/L


(0-2) 


 





 


Absolute Eosinophils (auto)


 


 0.1 10^3/uL


(0.0-0.2) 


 





 


Immature Granulocytes %


 


 0.10 %


(0.00-0.50) 


 





 


Eosinophils %


 


 1.5 %


(0.0-5.0) 


 





 


Basophils %


 


 0.6 %


(0.0-0.2) 


 





 


Basophils #


 


 0.1 10^3/uL


(0.0-0.1) 


 





 


Prothrombin Time


 


 13.8 SEC


(9.1-11.5) 


 





 


Prothrombin Time INR


(Non-Therap) 


 1.4 


 


 





 


Activated Partial


Thromboplast Time 


 25.2 SEC


(22.5-33.1) 


 





 


D-Dimer


 


 2.16 mg/L


(0.19-0.49) 


 





 


Sodium Level


 


 136 mmol/L


(132-145) 


 





 


Potassium Level


 


 4.3 mmol/L


(3.6-5.2) 


 





 


Chloride Level


 


 104.0 mmol/L


() 


 





 


Carbon Dioxide Level


 


 25.4 mmol/L


(20.0-32) 


 





 


Anion Gap  10.9   


 


Blood Urea Nitrogen


 


 17 mg/dL


(7-18) 


 





 


Creatinine


 


 1.22 mg/dL


(0.59-1.40) 


 





 


Estimated GFR (


American) 


 79.2 (>/=60) 


 


 





 


Est GFR (CKD-EPI)(Non-Afr


American) 


 65.5 (>/=60) 


 


 





 


BUN/Creatinine Ratio  13.0   


 


Glucose Level


 


 115 mg/dL


() 


 





 


Calcium Level


 


 8.7 mg/dL


(8.4-10.5) 


 





 


Magnesium Level


 


 1.8 mg/dL


(1.8-2.4) 


 





 


Total Bilirubin


 


 1.9 mg/dL


(0.2-1.0) 


 





 


Aspartate Amino Transf


(AST/SGOT) 


 20 U/L (0-35) 


 


 





 


Alanine Aminotransferase


(ALT/SGPT) 


 14 U/L (12-78) 


 


 





 


Alkaline Phosphatase


 


 117 U/L


() 


 





 


Troponin I High Sensitivity  24 ng/L (0-75)   27 ng/L (0-75) 


 


Pro-B-Type Natriuretic Peptide


 


 49210 pg/mL


(0-125) 


 





 


Total Protein


 


 6.7 g/dL


(6.4-8.2) 


 





 


Albumin


 


 3.2 g/dL


(3.4-5.0) 


 





 


Globulin  3.5   


 


Albumin/Globulin Ratio  0.914   


 


Thyroid Stimulating Hormone


(TSH) 


 5.218 mIU/mL


(0.358-3.740) 


 





 


Urine Opiates Screen


 


 


 NEGATIVE


(c/o300ng/mL) 





 


Urine Methadone Screen


 


 


 NEGATIVE


(c/o300ng/mL) 





 


Urine Barbiturates Screen


 


 


 NEGATIVE


(c/o200ng/mL) 





 


Urine Phencyclidine Screen


 


 


 NEGATIVE (c/o


25ng/mL) 





 


Ur


Amphetamine/Methamphetamine 


 


 NEGATIVE


(sn4988qh/mL) 





 


Urine MDMA Screen (Ecstasy)


 


 


 NEGATIVE


(c/o300ng/mL) 





 


Urine Benzodiazepines Screen


 


 


 NEGATIVE


(c/o200ng/mL) 





 


Urine Cocaine Metabolite


Screen 


 


 NEGATIVE


(c/o300ng/mL) 





 


Ur Tetrahydrocannabinol (THC)


Scrn 


 


 PRESUMPTIVE


POSITIVE (c/o 





 


Test


 6/3/22


04:05 22


05:00 22


05:25 





 


White Blood Count


 9.8 10^3/uL


(4.5-11.0) 7.8 10^3/uL


(4.5-11.0) 


 





 


Red Blood Count


 5.02 10^6/uL


(4.50-5.90) 4.91 10^6/uL


(4.50-5.90) 


 





 


Hemoglobin


 16.4 g/dL


(13.9-16.3) 16.0 g/dL


(13.9-16.3) 


 





 


Hematocrit


 50.7 %


(37.0-53.0) 49.2 %


(37.0-53.0) 


 





 


Mean Corpuscular Volume


 101.0 fL


() 100.2 fL


() 


 





 


Mean Corpuscular Hemoglobin


 32.7 pg


(26-34) 32.6 pg


(26-34) 


 





 


Mean Corpuscular Hemoglobin


Concent 32.3 g/dL


(33-36.5) 32.5 g/dL


(33-36.5) 


 





 


Red Cell Distribution Width


 15.4 %


(11.5-14.5) 15.2 %


(11.5-14.5) 


 





 


Platelet Count


 167 10^3/uL


(150-400) 171 10^3/uL


(150-400) 


 





 


Mean Platelet Volume


 10.7 fL


(7.8-11.0) 10.0 fL


(7.8-11.0) 


 





 


Neutrophils (%) (Auto)


 72.1 %


(41.0-85.0) 56.3 %


(41.0-85.0) 


 





 


Lymphocytes (%) (Auto)


 17.9 %


(24.0-44.0) 30.5 %


(24.0-44.0) 


 





 


Monocytes (%) (Auto)


 9.3 %


(5.0-12.0) 10.2 %


(5.0-12.0) 


 





 


Neutrophils # (Auto)


 7.1 10^3/uL


(1.8-7.7) 4.4 10^3/uL


(1.8-7.7) 


 





 


Lymphocytes # (Auto)


 1.75 10^3/uL1


(1.0-4.8) 2.38 10^3/uL1


(1.0-4.8) 


 





 


Monocytes # (Auto)


 0.9 10^3/uL


(0.3-0.8) 0.8 10^3/uL


(0.3-0.8) 


 





 


Absolute Immature Granulocyte


(auto 0.01 10^3 u/L


(0-2) 0.02 10^3 u/L


(0-2) 


 





 


Absolute Eosinophils (auto)


 0.0 10^3/uL


(0.0-0.2) 0.1 10^3/uL


(0.0-0.2) 


 





 


Immature Granulocytes %


 0.10 %


(0.00-0.50) 0.30 %


(0.00-0.50) 


 





 


Eosinophils %


 0.2 %


(0.0-5.0) 1.7 %


(0.0-5.0) 


 





 


Basophils %


 0.5 %


(0.0-0.2) 1.0 %


(0.0-0.2) 


 





 


Basophils #


 0.1 10^3/uL


(0.0-0.1) 0.1 10^3/uL


(0.0-0.1) 


 





 


Sodium Level


 133 mmol/L


(132-145) 134 mmol/L


(132-145) 135 mmol/L


(132-145) 





 


Potassium Level


 4.8 mmol/L


(3.6-5.2) 4.3 mmol/L


(3.6-5.2) 3.9 mmol/L


(3.6-5.2) 





 


Chloride Level


 103.0 mmol/L


() 103.0 mmol/L


() 102.0 mmol/L


() 





 


Carbon Dioxide Level


 17.7 mmol/L


(20.0-32) 24.7 mmol/L


(20.0-32) 26.3 mmol/L


(20.0-32) 





 


Anion Gap 17.1  10.6  10.6  


 


Blood Urea Nitrogen


 21 mg/dL


(7-18) 29 mg/dL


(7-18) 32 mg/dL


(7-18) 





 


Creatinine


 1.81 mg/dL


(0.59-1.40) 2.02 mg/dL


(0.59-1.40) 1.71 mg/dL


(0.59-1.40) 





 


Estimated GFR (


American) 50.2 (>/=60) 


 44.3 (>/=60) 


 53.6 (>/=60) 


 





 


Est GFR (CKD-EPI)(Non-Afr


American) 41.5 (>/=60) 


 36.6 (>/=60) 


 44.3 (>/=60) 


 





 


BUN/Creatinine Ratio 11.0  14.0  18.0  


 


Glucose Level


 111 mg/dL


() 101 mg/dL


() 85 mg/dL


() 





 


Calcium Level


 8.8 mg/dL


(8.4-10.5) 8.5 mg/dL


(8.4-10.5) 8.4 mg/dL


(8.4-10.5) 





 


Total Bilirubin


 1.8 mg/dL


(0.2-1.0) 1.1 mg/dL


(0.2-1.0) 


 





 


Aspartate Amino Transf


(AST/SGOT) 39 U/L (0-35) 


 43 U/L (0-35) 


 


 





 


Alanine Aminotransferase


(ALT/SGPT) 20 U/L (12-78) 


 28 U/L (12-78) 


 


 





 


Alkaline Phosphatase


 102 U/L


() 96 U/L


() 


 





 


Total Protein


 6.2 g/dL


(6.4-8.2) 6.0 g/dL


(6.4-8.2) 


 





 


Albumin


 2.8 g/dL


(3.4-5.0) 2.9 g/dL


(3.4-5.0) 


 





 


Globulin 3.4  3.1   


 


Albumin/Globulin Ratio 0.823  0.935   


 


Magnesium Level


 


 1.7 mg/dL


(1.8-2.4) 1.8 mg/dL


(1.8-2.4) 











Hospital Course


41 year old male who was admitted on 22 for acute on chronic systolic CHF 

and found to be in Afib with RVR. He was started on IV diuretics, beta blocker, 

CCB and admitted to the ICU.  Cardiology was consulted.  Over the course of stay

he has had appropriate I/O and BP and HR are improved on regimen. There is a 

significant hx of medical non compliance prior to admission but pt is more 

willing to f/u with providers and take appropriate medications after discharge. 

He will be discharged today on eliquis BID, lasix 40mg PO qD with 20mEq of KCl, 

along with diltiazem and sotalol. ACE-I held during this time. Kidney function 

improved over course of stay. He has an appointment with cardiology tomorrow in 

the office (no ECHO available to be done in-house at this time). 


Plan of discharge discussed with the pt


Medications printed and will be sent to pharmacy


Precautions discussed


Pt to f/u with cardiology tomorrow and needs to find a PCP


Condition/Impression


stable


Activity


nonstrenuous


Diet


cardiac, fluid restriction, salt restriction


Assessment


Chronic Afib - eliquis, sotalol 120mg BID, diltiazem 60mg TID


Acute on chronic systolic CHF - lasix 40mg PO and KCl 20mEq, ECHO not available 

in house at this time, f/u with cardiology for ECHO discussion tomorrow, cont 

fluid restrictions


Medication non-compliance - lengthy discussion with pt concerning need for 

compliance


Tobacco use - cessation stressed today. 


HTN - Sotalol (ACE-I held as lasix started) and diltiazem


Acute kidney failure- improved after holding ACE-I. needs to monitored in 

outpatient setting. 


GERD - omeprazole 





Stressed need for PCP as well. 


Precautions discussed


Pt agreed to plan


All questions answered


Pt to be discharged to home today, f/u with cardiology tomorrow.











ZANA NAYAK MD              2022 09:45

## 2022-06-05 NOTE — NUR
discharge

Patient being discharged home at this time in stable condition. Patient denies any 
additional resources at this time. Patient was assisted downstairs to private auto. 
Relinquished care for patient at this time.

## 2022-06-06 ENCOUNTER — HOSPITAL ENCOUNTER (OUTPATIENT)
Dept: HOSPITAL 65 - RAD | Age: 42
Discharge: HOME | End: 2022-06-06
Attending: INTERNAL MEDICINE
Payer: COMMERCIAL

## 2022-06-06 DIAGNOSIS — I27.20: ICD-10-CM

## 2022-06-06 DIAGNOSIS — I08.3: Primary | ICD-10-CM

## 2022-06-06 PROCEDURE — 93306 TTE W/DOPPLER COMPLETE: CPT

## 2022-06-06 NOTE — PCM.ECHO
--------------- APPROVED REPORT --------------





EXAM: Comprehensive 2D, Doppler, and color-flow Echocardiogram.



Patient Location: OUT-PATIENT



Indications

Atrial Fibrillation

Congenital Heart Disease



2D Dimensions

LVOT Diameter            2.21 (1.8-2.4cm)     LVEF(%)                  18.94 (>50%)         























M-Mode Dimensions

RVDd                     2.65 (2.1-3.2cm)     Left Atrium(MM)          5.25 (2.5-4.0cm)     

IVSd                     1.30 (0.7-1.1cm)     Aortic Root              2.65 (2.2-3.7cm)     

LVDd                     5.90 (4.0-5.6cm)     Aortic Cusp Exc          1.90 (1.5-2.0cm)     

PWd                      0.75 (0.7-1.1cm)     MV EPSS                  1.51 (<0.5cm)        

IVSs                     1.15 cm              FS (%)                   10.85 %              

LVDs                     5.25 (2.0-3.8cm)     ESV(Teich)               133.18 ml            

PWs                      1.15 cm              LVEF(%)                  23.23 (>50%)         





Volumes

Biplane 2D LV Volumes                               Biplane 2D LA Volumes                   





LVEDv A4C                           170.61 mL       LA ESV Index                            

LVESv A4C                           138.30 mL                                               





Aortic Valve

AoV Peak Sheldon.           0.80 m/s              AoV VTI                 13.30 cm              

AO Peak GR.             2.70 mmHg             AO Mean GR.             1.75 mmHg             

LVOT  VTI               13.51 cm              LVOT Peak Sheldon.          0.66 m/s              

HASMUKH(VTI)/BSA            3.89 cm2/m2           HASMUKH   (VTI)             3.89 cm2              

AI P 1/2 Time           309.60 ms                                                           





Mitral Valve

MR Peak Gr.          46.30mmHg                                                              













Pulmonary Valve

PV Peak Velocity        0.65m/s               PV Peak Grad.           1.75mmHg              

RVOT VTI                9.50cm                                                              



Tricuspid Valve

TR P. Velocity          2.25m/s               RAP ESTIMATE            10.00mmHg             

TR Peak Gr.             20.68mmHg             RVSP                    30.68mmHg             





 LEFT VENTRICLE 

The left ventricle is normal size. Left ventricular systolic function is severely decreased. There 

is normal left ventricular wall thickness. There is global hypokinesis of the left ventricle. 

Severe diastolic dysfunction is present (restrictive filling). There is no ventricular septal 

defect visualized. No left ventricle thrombus noted on this study. LVEF is 15-20%.



 RIGHT VENTRICLE 

The right ventricle is normal size. Right ventricular systolic function is severely reduced. There 

is normal right ventricular wall thickness.



 ATRIA 

The left atrium size is normal. Right atrium is moderately dilated. The interatrial septum is 

intact with no evidence for an atrial septal defect.



 AORTIC VALVE 

The aortic valve is normal in structure. There is no aortic valvular stenosis. Moderate to severe 

aortic regurgitation There is no aortic valvular vegetation.



 MITRAL VALVE 

The mitral valve is normal in structure. There is no mitral valve stenosis. Moderate to severe 

mitral regurgitation There is no evidence of mitral valve vegetations.



 TRICUSPID VALVE 

The tricuspid valve is normal in structure. There is no tricuspid valve stenosis. Moderate to 

severe tricuspid regurgitation Moderate pulmonary hypertension. There is no tricuspid valve 

vegetations.



 PULMONIC VALVE 

Pulmonic valve is not well visualized. There is no pulmonic valvular stenosis. There is no pulmonic 

valvular regurgitation. 



 GREAT VESSELS 

The aortic root is normal in size. Pulmonary artery is not well visualized. Aortic arch is not well 

visualized. The IVC is normal in size and collapses >50% with inspiration.



 PERICARDIUM

There is no pericardial effusion. There is no pleural effusion.



Other Information 

Study Quality: Fair



<Conclusion>

Left ventricular systolic function is severely decreased.

LVEF is 15-20%.

There is global hypokinesis of the left ventricle.

Severe diastolic dysfunction is present (restrictive filling).

Right atrium is moderately dilated.

Moderate to severe aortic  regurgitation

Moderate to severe mitral regurgitation

Moderate to severe tricuspid regurgitation

Moderate pulmonary hypertension.



Electronically signed by : BROOKLYN MCGRATH.   06/06/2022 16:47:12

## 2022-06-08 ENCOUNTER — HOSPITAL ENCOUNTER (INPATIENT)
Dept: HOSPITAL 65 - ICU | Age: 42
LOS: 5 days | Discharge: HOME | DRG: 291 | End: 2022-06-13
Attending: SPECIALIST | Admitting: SPECIALIST
Payer: SELF-PAY

## 2022-06-08 VITALS — SYSTOLIC BLOOD PRESSURE: 106 MMHG | DIASTOLIC BLOOD PRESSURE: 70 MMHG

## 2022-06-08 VITALS — SYSTOLIC BLOOD PRESSURE: 113 MMHG | DIASTOLIC BLOOD PRESSURE: 76 MMHG

## 2022-06-08 VITALS — SYSTOLIC BLOOD PRESSURE: 114 MMHG | DIASTOLIC BLOOD PRESSURE: 73 MMHG

## 2022-06-08 VITALS — HEIGHT: 69 IN | BODY MASS INDEX: 38.59 KG/M2 | WEIGHT: 260.56 LBS

## 2022-06-08 VITALS — SYSTOLIC BLOOD PRESSURE: 131 MMHG | DIASTOLIC BLOOD PRESSURE: 83 MMHG

## 2022-06-08 VITALS — SYSTOLIC BLOOD PRESSURE: 114 MMHG | DIASTOLIC BLOOD PRESSURE: 74 MMHG

## 2022-06-08 VITALS — SYSTOLIC BLOOD PRESSURE: 131 MMHG | DIASTOLIC BLOOD PRESSURE: 88 MMHG

## 2022-06-08 VITALS — SYSTOLIC BLOOD PRESSURE: 126 MMHG | DIASTOLIC BLOOD PRESSURE: 77 MMHG

## 2022-06-08 VITALS — SYSTOLIC BLOOD PRESSURE: 124 MMHG | DIASTOLIC BLOOD PRESSURE: 85 MMHG

## 2022-06-08 VITALS — DIASTOLIC BLOOD PRESSURE: 87 MMHG | SYSTOLIC BLOOD PRESSURE: 115 MMHG

## 2022-06-08 VITALS — SYSTOLIC BLOOD PRESSURE: 122 MMHG | DIASTOLIC BLOOD PRESSURE: 74 MMHG

## 2022-06-08 VITALS — DIASTOLIC BLOOD PRESSURE: 112 MMHG | SYSTOLIC BLOOD PRESSURE: 158 MMHG

## 2022-06-08 VITALS — SYSTOLIC BLOOD PRESSURE: 129 MMHG | DIASTOLIC BLOOD PRESSURE: 90 MMHG

## 2022-06-08 VITALS — SYSTOLIC BLOOD PRESSURE: 124 MMHG | DIASTOLIC BLOOD PRESSURE: 69 MMHG

## 2022-06-08 VITALS — DIASTOLIC BLOOD PRESSURE: 93 MMHG | SYSTOLIC BLOOD PRESSURE: 116 MMHG

## 2022-06-08 VITALS — DIASTOLIC BLOOD PRESSURE: 89 MMHG | SYSTOLIC BLOOD PRESSURE: 138 MMHG

## 2022-06-08 VITALS — DIASTOLIC BLOOD PRESSURE: 66 MMHG | SYSTOLIC BLOOD PRESSURE: 124 MMHG

## 2022-06-08 VITALS — SYSTOLIC BLOOD PRESSURE: 123 MMHG | DIASTOLIC BLOOD PRESSURE: 88 MMHG

## 2022-06-08 VITALS — DIASTOLIC BLOOD PRESSURE: 27 MMHG | SYSTOLIC BLOOD PRESSURE: 76 MMHG

## 2022-06-08 VITALS — SYSTOLIC BLOOD PRESSURE: 132 MMHG | DIASTOLIC BLOOD PRESSURE: 71 MMHG

## 2022-06-08 VITALS — DIASTOLIC BLOOD PRESSURE: 73 MMHG | SYSTOLIC BLOOD PRESSURE: 119 MMHG

## 2022-06-08 VITALS — DIASTOLIC BLOOD PRESSURE: 94 MMHG | SYSTOLIC BLOOD PRESSURE: 125 MMHG

## 2022-06-08 VITALS — DIASTOLIC BLOOD PRESSURE: 101 MMHG | SYSTOLIC BLOOD PRESSURE: 134 MMHG

## 2022-06-08 VITALS — DIASTOLIC BLOOD PRESSURE: 70 MMHG | SYSTOLIC BLOOD PRESSURE: 113 MMHG

## 2022-06-08 VITALS — DIASTOLIC BLOOD PRESSURE: 82 MMHG | SYSTOLIC BLOOD PRESSURE: 118 MMHG

## 2022-06-08 VITALS — DIASTOLIC BLOOD PRESSURE: 86 MMHG | SYSTOLIC BLOOD PRESSURE: 111 MMHG

## 2022-06-08 VITALS — DIASTOLIC BLOOD PRESSURE: 78 MMHG | SYSTOLIC BLOOD PRESSURE: 111 MMHG

## 2022-06-08 VITALS — SYSTOLIC BLOOD PRESSURE: 140 MMHG | DIASTOLIC BLOOD PRESSURE: 83 MMHG

## 2022-06-08 VITALS — SYSTOLIC BLOOD PRESSURE: 136 MMHG | DIASTOLIC BLOOD PRESSURE: 89 MMHG

## 2022-06-08 VITALS — SYSTOLIC BLOOD PRESSURE: 129 MMHG | DIASTOLIC BLOOD PRESSURE: 80 MMHG

## 2022-06-08 VITALS — DIASTOLIC BLOOD PRESSURE: 89 MMHG | SYSTOLIC BLOOD PRESSURE: 140 MMHG

## 2022-06-08 VITALS — DIASTOLIC BLOOD PRESSURE: 58 MMHG | SYSTOLIC BLOOD PRESSURE: 117 MMHG

## 2022-06-08 VITALS — SYSTOLIC BLOOD PRESSURE: 126 MMHG | DIASTOLIC BLOOD PRESSURE: 86 MMHG

## 2022-06-08 VITALS — DIASTOLIC BLOOD PRESSURE: 76 MMHG | SYSTOLIC BLOOD PRESSURE: 114 MMHG

## 2022-06-08 DIAGNOSIS — F41.9: ICD-10-CM

## 2022-06-08 DIAGNOSIS — I42.0: ICD-10-CM

## 2022-06-08 DIAGNOSIS — M10.9: ICD-10-CM

## 2022-06-08 DIAGNOSIS — I45.89: ICD-10-CM

## 2022-06-08 DIAGNOSIS — F10.20: ICD-10-CM

## 2022-06-08 DIAGNOSIS — Z79.890: ICD-10-CM

## 2022-06-08 DIAGNOSIS — Z87.891: ICD-10-CM

## 2022-06-08 DIAGNOSIS — Z86.718: ICD-10-CM

## 2022-06-08 DIAGNOSIS — F32.A: ICD-10-CM

## 2022-06-08 DIAGNOSIS — I50.43: ICD-10-CM

## 2022-06-08 DIAGNOSIS — I48.92: ICD-10-CM

## 2022-06-08 DIAGNOSIS — E66.9: ICD-10-CM

## 2022-06-08 DIAGNOSIS — Z79.899: ICD-10-CM

## 2022-06-08 DIAGNOSIS — I11.0: Primary | ICD-10-CM

## 2022-06-08 DIAGNOSIS — Z79.01: ICD-10-CM

## 2022-06-08 DIAGNOSIS — I48.91: ICD-10-CM

## 2022-06-08 DIAGNOSIS — G47.33: ICD-10-CM

## 2022-06-08 LAB
BASOPHILS # BLD AUTO: 0.1 10^3/UL (ref 0–0.1)
BASOPHILS NFR BLD AUTO: 0.8 % (ref 0–0.2)
CO2 BLDA-SCNC: 26.6 MMOL/L (ref 20–32)
EOSINOPHIL # BLD AUTO: 0.2 10^3/UL (ref 0–0.2)
EOSINOPHIL NFR BLD AUTO: 2.7 % (ref 0–5)
ERYTHROCYTE [DISTWIDTH] IN BLOOD BY AUTOMATED COUNT: 14.9 % (ref 11.5–14.5)
GLUCOSE PRE 100 G GLC PO SERPL-MCNC: 112 MG/DL (ref 70–110)
LYMPHOCYTES # BLD AUTO: 1.91 10^3/UL1 (ref 1–4.8)
LYMPHOCYTES NFR BLD AUTO: 30.9 % (ref 24–44)
MCH RBC QN AUTO: 32.2 PG (ref 26–34)
MONOCYTES # BLD AUTO: 0.7 10^3/UL (ref 0.3–0.8)
MONOCYTES NFR BLD AUTO: 11 % (ref 5–12)
NEUTROPHILS # BLD AUTO: 3.4 10^3/UL (ref 1.8–7.7)
NEUTROPHILS NFR BLD AUTO: 54.4 % (ref 41–85)
PLATELET # BLD AUTO: 189 10^3/UL (ref 150–400)

## 2022-06-08 PROCEDURE — 84484 ASSAY OF TROPONIN QUANT: CPT

## 2022-06-08 PROCEDURE — 85730 THROMBOPLASTIN TIME PARTIAL: CPT

## 2022-06-08 PROCEDURE — 36415 COLL VENOUS BLD VENIPUNCTURE: CPT

## 2022-06-08 PROCEDURE — 85610 PROTHROMBIN TIME: CPT

## 2022-06-08 PROCEDURE — 71046 X-RAY EXAM CHEST 2 VIEWS: CPT

## 2022-06-08 PROCEDURE — 93306 TTE W/DOPPLER COMPLETE: CPT

## 2022-06-08 PROCEDURE — 84443 ASSAY THYROID STIM HORMONE: CPT

## 2022-06-08 PROCEDURE — 87637 SARSCOV2&INF A&B&RSV AMP PRB: CPT

## 2022-06-08 PROCEDURE — 80053 COMPREHEN METABOLIC PANEL: CPT

## 2022-06-08 PROCEDURE — 85025 COMPLETE CBC W/AUTO DIFF WBC: CPT

## 2022-06-08 PROCEDURE — 93005 ELECTROCARDIOGRAM TRACING: CPT

## 2022-06-08 PROCEDURE — 83735 ASSAY OF MAGNESIUM: CPT

## 2022-06-08 PROCEDURE — 83880 ASSAY OF NATRIURETIC PEPTIDE: CPT

## 2022-06-08 PROCEDURE — 85379 FIBRIN DEGRADATION QUANT: CPT

## 2022-06-08 RX ADMIN — CARVEDILOL SCH MG: 6.25 TABLET, FILM COATED ORAL at 21:04

## 2022-06-08 RX ADMIN — AMIODARONE HYDROCHLORIDE SCH MLS/HR: 1.8 INJECTION, SOLUTION INTRAVENOUS at 16:49

## 2022-06-08 RX ADMIN — AMIODARONE HYDROCHLORIDE SCH MLS/HR: 1.8 INJECTION, SOLUTION INTRAVENOUS at 22:30

## 2022-06-08 RX ADMIN — HEPARIN SODIUM AND DEXTROSE SCH MLS/HR: 4000; 5 INJECTION INTRAVENOUS at 16:33

## 2022-06-08 NOTE — DIREP
PROCEDURE:CHEST 2 VIEWS

 

COMPARISON:[facility], CR, XRAY CHEST SINGLE VW, 06/02/2022, 

10:40 AM.

 

INDICATIONS:chf

 

FINDINGS:

LUNGS/PLEURA:Mild hazy opacity in the right lung base, differential includes 

pneumonia, atelectasis or possibly asymmetric edema.  Left lung is clear.

VASCULATURE:Normal vascularity

CARDIAC:Normal size heart

MEDIASTINUM:Normal.  No visible mass or adenopathy. 

BONES:Distal right clavicle resection.

OTHER:EKG leads on the chest

 

CONCLUSION:Mild hazy opacity in the right lung base suggesting pneumonia or 

atelectasis.

 

 

Dictated by: Espinoza Braga M.D. on 06/08/2022 at 10:06 PM     

Electronically Signed By: Espinoza Braga M.D. on 06/08/2022 at 10:07 PM

## 2022-06-08 NOTE — NUR
ARRIVAL

PT ARRIVED AMBULATORY FROM DR. ROBERSON'S OFFICE TO ICU1 . ORDERS REVIEWED WITH PATIENT. 
HOOKED UP TO ALL MONITORS. ORIENTED TO ICU. CALL LIGHT WITHIN REACH.

## 2022-06-09 VITALS — SYSTOLIC BLOOD PRESSURE: 120 MMHG | DIASTOLIC BLOOD PRESSURE: 86 MMHG

## 2022-06-09 VITALS — SYSTOLIC BLOOD PRESSURE: 123 MMHG | DIASTOLIC BLOOD PRESSURE: 73 MMHG

## 2022-06-09 VITALS — SYSTOLIC BLOOD PRESSURE: 128 MMHG | DIASTOLIC BLOOD PRESSURE: 77 MMHG

## 2022-06-09 VITALS — SYSTOLIC BLOOD PRESSURE: 119 MMHG | DIASTOLIC BLOOD PRESSURE: 81 MMHG

## 2022-06-09 VITALS — DIASTOLIC BLOOD PRESSURE: 82 MMHG | SYSTOLIC BLOOD PRESSURE: 129 MMHG

## 2022-06-09 VITALS — SYSTOLIC BLOOD PRESSURE: 114 MMHG | DIASTOLIC BLOOD PRESSURE: 79 MMHG

## 2022-06-09 VITALS — SYSTOLIC BLOOD PRESSURE: 97 MMHG | DIASTOLIC BLOOD PRESSURE: 60 MMHG

## 2022-06-09 VITALS — DIASTOLIC BLOOD PRESSURE: 70 MMHG | SYSTOLIC BLOOD PRESSURE: 120 MMHG

## 2022-06-09 VITALS — DIASTOLIC BLOOD PRESSURE: 82 MMHG | SYSTOLIC BLOOD PRESSURE: 121 MMHG

## 2022-06-09 VITALS — SYSTOLIC BLOOD PRESSURE: 114 MMHG | DIASTOLIC BLOOD PRESSURE: 83 MMHG

## 2022-06-09 VITALS — SYSTOLIC BLOOD PRESSURE: 125 MMHG | DIASTOLIC BLOOD PRESSURE: 69 MMHG

## 2022-06-09 VITALS — DIASTOLIC BLOOD PRESSURE: 92 MMHG | SYSTOLIC BLOOD PRESSURE: 124 MMHG

## 2022-06-09 VITALS — DIASTOLIC BLOOD PRESSURE: 72 MMHG | SYSTOLIC BLOOD PRESSURE: 130 MMHG

## 2022-06-09 VITALS — SYSTOLIC BLOOD PRESSURE: 115 MMHG | DIASTOLIC BLOOD PRESSURE: 78 MMHG

## 2022-06-09 VITALS — SYSTOLIC BLOOD PRESSURE: 118 MMHG | DIASTOLIC BLOOD PRESSURE: 95 MMHG

## 2022-06-09 VITALS — SYSTOLIC BLOOD PRESSURE: 127 MMHG | DIASTOLIC BLOOD PRESSURE: 89 MMHG

## 2022-06-09 VITALS — SYSTOLIC BLOOD PRESSURE: 116 MMHG | DIASTOLIC BLOOD PRESSURE: 66 MMHG

## 2022-06-09 VITALS — DIASTOLIC BLOOD PRESSURE: 55 MMHG | SYSTOLIC BLOOD PRESSURE: 107 MMHG

## 2022-06-09 VITALS — DIASTOLIC BLOOD PRESSURE: 60 MMHG | SYSTOLIC BLOOD PRESSURE: 96 MMHG

## 2022-06-09 VITALS — SYSTOLIC BLOOD PRESSURE: 126 MMHG | DIASTOLIC BLOOD PRESSURE: 75 MMHG

## 2022-06-09 VITALS — DIASTOLIC BLOOD PRESSURE: 85 MMHG | SYSTOLIC BLOOD PRESSURE: 114 MMHG

## 2022-06-09 VITALS — SYSTOLIC BLOOD PRESSURE: 125 MMHG | DIASTOLIC BLOOD PRESSURE: 83 MMHG

## 2022-06-09 VITALS — DIASTOLIC BLOOD PRESSURE: 74 MMHG | SYSTOLIC BLOOD PRESSURE: 114 MMHG

## 2022-06-09 VITALS — DIASTOLIC BLOOD PRESSURE: 88 MMHG | SYSTOLIC BLOOD PRESSURE: 140 MMHG

## 2022-06-09 VITALS — SYSTOLIC BLOOD PRESSURE: 133 MMHG | DIASTOLIC BLOOD PRESSURE: 85 MMHG

## 2022-06-09 VITALS — SYSTOLIC BLOOD PRESSURE: 147 MMHG | DIASTOLIC BLOOD PRESSURE: 90 MMHG

## 2022-06-09 VITALS — DIASTOLIC BLOOD PRESSURE: 76 MMHG | SYSTOLIC BLOOD PRESSURE: 118 MMHG

## 2022-06-09 VITALS — SYSTOLIC BLOOD PRESSURE: 124 MMHG | DIASTOLIC BLOOD PRESSURE: 84 MMHG

## 2022-06-09 VITALS — DIASTOLIC BLOOD PRESSURE: 101 MMHG | SYSTOLIC BLOOD PRESSURE: 136 MMHG

## 2022-06-09 VITALS — DIASTOLIC BLOOD PRESSURE: 83 MMHG | SYSTOLIC BLOOD PRESSURE: 135 MMHG

## 2022-06-09 VITALS — SYSTOLIC BLOOD PRESSURE: 99 MMHG | DIASTOLIC BLOOD PRESSURE: 59 MMHG

## 2022-06-09 VITALS — DIASTOLIC BLOOD PRESSURE: 74 MMHG | SYSTOLIC BLOOD PRESSURE: 122 MMHG

## 2022-06-09 VITALS — SYSTOLIC BLOOD PRESSURE: 113 MMHG | DIASTOLIC BLOOD PRESSURE: 73 MMHG

## 2022-06-09 VITALS — DIASTOLIC BLOOD PRESSURE: 81 MMHG | SYSTOLIC BLOOD PRESSURE: 110 MMHG

## 2022-06-09 VITALS — SYSTOLIC BLOOD PRESSURE: 113 MMHG | DIASTOLIC BLOOD PRESSURE: 70 MMHG

## 2022-06-09 VITALS — DIASTOLIC BLOOD PRESSURE: 90 MMHG | SYSTOLIC BLOOD PRESSURE: 131 MMHG

## 2022-06-09 VITALS — SYSTOLIC BLOOD PRESSURE: 107 MMHG | DIASTOLIC BLOOD PRESSURE: 44 MMHG

## 2022-06-09 VITALS — DIASTOLIC BLOOD PRESSURE: 80 MMHG | SYSTOLIC BLOOD PRESSURE: 125 MMHG

## 2022-06-09 VITALS — SYSTOLIC BLOOD PRESSURE: 125 MMHG | DIASTOLIC BLOOD PRESSURE: 86 MMHG

## 2022-06-09 VITALS — DIASTOLIC BLOOD PRESSURE: 81 MMHG | SYSTOLIC BLOOD PRESSURE: 131 MMHG

## 2022-06-09 VITALS — SYSTOLIC BLOOD PRESSURE: 146 MMHG | DIASTOLIC BLOOD PRESSURE: 102 MMHG

## 2022-06-09 VITALS — SYSTOLIC BLOOD PRESSURE: 111 MMHG | DIASTOLIC BLOOD PRESSURE: 60 MMHG

## 2022-06-09 VITALS — SYSTOLIC BLOOD PRESSURE: 127 MMHG | DIASTOLIC BLOOD PRESSURE: 78 MMHG

## 2022-06-09 VITALS — DIASTOLIC BLOOD PRESSURE: 82 MMHG | SYSTOLIC BLOOD PRESSURE: 126 MMHG

## 2022-06-09 VITALS — DIASTOLIC BLOOD PRESSURE: 72 MMHG | SYSTOLIC BLOOD PRESSURE: 125 MMHG

## 2022-06-09 VITALS — SYSTOLIC BLOOD PRESSURE: 122 MMHG | DIASTOLIC BLOOD PRESSURE: 87 MMHG

## 2022-06-09 VITALS — DIASTOLIC BLOOD PRESSURE: 62 MMHG | SYSTOLIC BLOOD PRESSURE: 107 MMHG

## 2022-06-09 VITALS — SYSTOLIC BLOOD PRESSURE: 139 MMHG | DIASTOLIC BLOOD PRESSURE: 105 MMHG

## 2022-06-09 VITALS — DIASTOLIC BLOOD PRESSURE: 67 MMHG | SYSTOLIC BLOOD PRESSURE: 116 MMHG

## 2022-06-09 VITALS — DIASTOLIC BLOOD PRESSURE: 86 MMHG | SYSTOLIC BLOOD PRESSURE: 126 MMHG

## 2022-06-09 VITALS — SYSTOLIC BLOOD PRESSURE: 117 MMHG | DIASTOLIC BLOOD PRESSURE: 77 MMHG

## 2022-06-09 VITALS — SYSTOLIC BLOOD PRESSURE: 116 MMHG | DIASTOLIC BLOOD PRESSURE: 78 MMHG

## 2022-06-09 VITALS — DIASTOLIC BLOOD PRESSURE: 83 MMHG | SYSTOLIC BLOOD PRESSURE: 144 MMHG

## 2022-06-09 VITALS — DIASTOLIC BLOOD PRESSURE: 90 MMHG | SYSTOLIC BLOOD PRESSURE: 126 MMHG

## 2022-06-09 VITALS — SYSTOLIC BLOOD PRESSURE: 126 MMHG | DIASTOLIC BLOOD PRESSURE: 108 MMHG

## 2022-06-09 VITALS — SYSTOLIC BLOOD PRESSURE: 139 MMHG | DIASTOLIC BLOOD PRESSURE: 85 MMHG

## 2022-06-09 VITALS — SYSTOLIC BLOOD PRESSURE: 125 MMHG | DIASTOLIC BLOOD PRESSURE: 84 MMHG

## 2022-06-09 VITALS — SYSTOLIC BLOOD PRESSURE: 130 MMHG | DIASTOLIC BLOOD PRESSURE: 81 MMHG

## 2022-06-09 VITALS — DIASTOLIC BLOOD PRESSURE: 68 MMHG | SYSTOLIC BLOOD PRESSURE: 104 MMHG

## 2022-06-09 VITALS — DIASTOLIC BLOOD PRESSURE: 80 MMHG | SYSTOLIC BLOOD PRESSURE: 123 MMHG

## 2022-06-09 VITALS — SYSTOLIC BLOOD PRESSURE: 138 MMHG | DIASTOLIC BLOOD PRESSURE: 93 MMHG

## 2022-06-09 VITALS — DIASTOLIC BLOOD PRESSURE: 92 MMHG | SYSTOLIC BLOOD PRESSURE: 136 MMHG

## 2022-06-09 VITALS — SYSTOLIC BLOOD PRESSURE: 126 MMHG | DIASTOLIC BLOOD PRESSURE: 82 MMHG

## 2022-06-09 VITALS — SYSTOLIC BLOOD PRESSURE: 135 MMHG | DIASTOLIC BLOOD PRESSURE: 77 MMHG

## 2022-06-09 VITALS — SYSTOLIC BLOOD PRESSURE: 107 MMHG | DIASTOLIC BLOOD PRESSURE: 58 MMHG

## 2022-06-09 VITALS — DIASTOLIC BLOOD PRESSURE: 90 MMHG | SYSTOLIC BLOOD PRESSURE: 118 MMHG

## 2022-06-09 VITALS — DIASTOLIC BLOOD PRESSURE: 74 MMHG | SYSTOLIC BLOOD PRESSURE: 132 MMHG

## 2022-06-09 VITALS — SYSTOLIC BLOOD PRESSURE: 116 MMHG | DIASTOLIC BLOOD PRESSURE: 75 MMHG

## 2022-06-09 VITALS — SYSTOLIC BLOOD PRESSURE: 131 MMHG | DIASTOLIC BLOOD PRESSURE: 80 MMHG

## 2022-06-09 VITALS — DIASTOLIC BLOOD PRESSURE: 67 MMHG | SYSTOLIC BLOOD PRESSURE: 118 MMHG

## 2022-06-09 VITALS — SYSTOLIC BLOOD PRESSURE: 128 MMHG | DIASTOLIC BLOOD PRESSURE: 86 MMHG

## 2022-06-09 VITALS — SYSTOLIC BLOOD PRESSURE: 127 MMHG | DIASTOLIC BLOOD PRESSURE: 85 MMHG

## 2022-06-09 VITALS — DIASTOLIC BLOOD PRESSURE: 75 MMHG | SYSTOLIC BLOOD PRESSURE: 119 MMHG

## 2022-06-09 VITALS — DIASTOLIC BLOOD PRESSURE: 50 MMHG | SYSTOLIC BLOOD PRESSURE: 89 MMHG

## 2022-06-09 VITALS — SYSTOLIC BLOOD PRESSURE: 129 MMHG | DIASTOLIC BLOOD PRESSURE: 77 MMHG

## 2022-06-09 VITALS — DIASTOLIC BLOOD PRESSURE: 70 MMHG | SYSTOLIC BLOOD PRESSURE: 111 MMHG

## 2022-06-09 VITALS — DIASTOLIC BLOOD PRESSURE: 74 MMHG | SYSTOLIC BLOOD PRESSURE: 112 MMHG

## 2022-06-09 VITALS — DIASTOLIC BLOOD PRESSURE: 84 MMHG | SYSTOLIC BLOOD PRESSURE: 118 MMHG

## 2022-06-09 VITALS — SYSTOLIC BLOOD PRESSURE: 118 MMHG | DIASTOLIC BLOOD PRESSURE: 94 MMHG

## 2022-06-09 VITALS — DIASTOLIC BLOOD PRESSURE: 93 MMHG | SYSTOLIC BLOOD PRESSURE: 141 MMHG

## 2022-06-09 VITALS — DIASTOLIC BLOOD PRESSURE: 47 MMHG | SYSTOLIC BLOOD PRESSURE: 133 MMHG

## 2022-06-09 VITALS — DIASTOLIC BLOOD PRESSURE: 78 MMHG | SYSTOLIC BLOOD PRESSURE: 125 MMHG

## 2022-06-09 VITALS — SYSTOLIC BLOOD PRESSURE: 119 MMHG | DIASTOLIC BLOOD PRESSURE: 60 MMHG

## 2022-06-09 VITALS — SYSTOLIC BLOOD PRESSURE: 120 MMHG | DIASTOLIC BLOOD PRESSURE: 74 MMHG

## 2022-06-09 VITALS — SYSTOLIC BLOOD PRESSURE: 107 MMHG | DIASTOLIC BLOOD PRESSURE: 67 MMHG

## 2022-06-09 VITALS — SYSTOLIC BLOOD PRESSURE: 125 MMHG | DIASTOLIC BLOOD PRESSURE: 85 MMHG

## 2022-06-09 VITALS — SYSTOLIC BLOOD PRESSURE: 139 MMHG | DIASTOLIC BLOOD PRESSURE: 75 MMHG

## 2022-06-09 VITALS — SYSTOLIC BLOOD PRESSURE: 120 MMHG | DIASTOLIC BLOOD PRESSURE: 94 MMHG

## 2022-06-09 VITALS — DIASTOLIC BLOOD PRESSURE: 82 MMHG | SYSTOLIC BLOOD PRESSURE: 127 MMHG

## 2022-06-09 VITALS — DIASTOLIC BLOOD PRESSURE: 93 MMHG | SYSTOLIC BLOOD PRESSURE: 120 MMHG

## 2022-06-09 LAB
CO2 BLDA-SCNC: 26.3 MMOL/L (ref 20–32)
GLUCOSE PRE 100 G GLC PO SERPL-MCNC: 127 MG/DL (ref 70–110)

## 2022-06-09 RX ADMIN — CARVEDILOL SCH MG: 6.25 TABLET, FILM COATED ORAL at 08:44

## 2022-06-09 RX ADMIN — CARVEDILOL SCH MG: 6.25 TABLET, FILM COATED ORAL at 20:41

## 2022-06-09 RX ADMIN — TORSEMIDE SCH MG: 20 TABLET ORAL at 08:46

## 2022-06-09 RX ADMIN — APIXABAN SCH MG: 5 TABLET, FILM COATED ORAL at 18:52

## 2022-06-09 RX ADMIN — POTASSIUM CHLORIDE SCH MEQ: 750 TABLET, FILM COATED, EXTENDED RELEASE ORAL at 08:45

## 2022-06-09 RX ADMIN — DAPAGLIFLOZIN SCH MG: 10 TABLET, FILM COATED ORAL at 08:44

## 2022-06-09 RX ADMIN — APIXABAN SCH MG: 5 TABLET, FILM COATED ORAL at 21:00

## 2022-06-09 RX ADMIN — SPIRONOLACTONE SCH MG: 25 TABLET, FILM COATED ORAL at 08:44

## 2022-06-09 RX ADMIN — AMIODARONE HYDROCHLORIDE SCH MLS/HR: 1.8 INJECTION, SOLUTION INTRAVENOUS at 09:13

## 2022-06-09 RX ADMIN — NICOTINE SCH EACH: 21 PATCH, EXTENDED RELEASE TOPICAL at 08:44

## 2022-06-09 RX ADMIN — HEPARIN SODIUM AND DEXTROSE SCH MLS/HR: 4000; 5 INJECTION INTRAVENOUS at 12:58

## 2022-06-09 RX ADMIN — AMIODARONE HYDROCHLORIDE SCH MG: 200 TABLET ORAL at 20:41

## 2022-06-09 NOTE — HPH
ADMIT DATE: 2022

DICTATOR NAME: Daylin Macias MD

CHIEF COMPLAINT:  Recurrent syncope, rapid irregular heartbeat, marked shortness

of breath, leg edema.



HISTORY OF PRESENT ILLNESS:  The patient is a 41-year-old white male who has 

known history of nonischemic dilated cardiomyopathy, chronic systolic heart 

failure and is primarily Dr. Epperson's patient, I used to see him about 5 to 6 

years ago and apparently he had hypertension, hypertensive heart disease and had

chronic diastolic heart failure with obesity and he was lost to followup.  At 

the present time, he was admitted on 2022 with rapid atrial fibrillation 

and apparently poorly managed with Cardizem with less than 20% ejection fraction

along with low dose sotalol, Lasix and lisinopril 20 mg once a day, has been 

running a rate of 130 to 140 with shortness of breath and leg edema and 

presented to the office, having had blackout twice and was in overt failure and 

was in 2:1 atrial flutter with a ventricular rate of 130 with a flutter rate of 

260 with regular RR intervals and in view of unstable rhythm with 20% ejection 

fraction, was admitted in ICU for IV amiodarone drip and full heparinization 

with history of DVT in both lower extremities for rate control and subsequently 

DC cardioversion, which easily converted flutter and restored regular sinus 

rhythm and eventually optimized his medical therapy for chronic systolic heart 

failure.



ALLERGIES:  None known.



MEDICATIONS:  He has been on Cardizem 60 mg twice a day, sotalol 80 mg half 

tablet twice a day.  He is on Lasix 40-80 mg once a day, potassium 1 tablet 

daily, lisinopril 20 mg once a day and he has been on Eliquis 5 mg twice a day 

and omeprazole 20 mg once a day.



PAST MEDICAL HISTORY:  History of hypertension, hypertensive heart disease, 

longstanding.  He has had significant emotional lability and has underlying 

anxiety, depression, chronic alcoholism.  See my attached note for the details 

of the same.



SOCIAL HISTORY:  History of prior alcohol use, not drinking at the present time.

 History of tobacco use.  He has smoked heavily in the past and he just quit a 

few days ago, had a hiatus for 7 years ago when he did not smoke, started 

smoking at age 15.



FAMILY HISTORY:  Positive for heart problems.  Mother had heart trouble.  Father

committed suicide in his mid 30s.



PHYSICAL EXAMINATION:

GENERAL:  He is alert, awake, oriented and was having significant shortness of 

breath; 175 cm and 119 kilograms, BMI 38.9.

VITAL SIGNS:  Heart rate of 130, blood pressure 130/80.  Respirations were 20.

HEENT:  Unremarkable.

NECK:  JVD discernible.  No definite carotid bruits.

CHEST:  Thick chest wall.

LUNGS:  Poor air entry bilaterally.

HEART:  Sounds S1, S2 were showing tachycardia and no murmurs were audible.

ABDOMEN:  Protuberant abdomen.  Liver enlarged about 3 fingerbreadths below the 

right costal margin, early ascites cannot be excluded.

EXTREMITIES:  3+ leg edema.  Cade complexion of both lower extremities and 

distal pulses obscured due to significant edema.



Has a history of DVT by ultrasound in the past, history of pulmonary emboli.  He

has had COVID few months ago, not vaccinated at the present time.



LABORATORY DATA:  Showed normal CBC, 16 grams hemoglobin and his chemistry 

showed a 1.6 magnesium, potassium 4, BUN 18, creatinine 1.3, 112 glucose and 

ProBNP was 22,013.  TSH was 5.87.  COVID serology was negative.  Chest x-ray:  

Cardiomegaly.  EK:1 flutter, ventricular response of 130.



IMPRESSION:  Acute on chronic systolic heart failure, nonischemic dilated 

cardiomyopathy, atrial flutter with 2:1 conduction with a ventricular rate of 

130.



PLAN:  At this time, admit the patient, IV amiodarone infusion with full 

heparinization and attempt DC cardioversion once rate controlled and fully 

heparinized and is a candidate for spironolactone and Entresto along with 

carvedilol [] in long term and reassess his ejection fraction after regular 

rhythm is restored to assess improvement because of the rapid rate could result 

in false estimation of ejection fraction.  Further management depending on the 

clinical course.









Daylin Macias MD

DR: ANTOINE/ELISE/JUD TID: [id number] RECEIPT: [id number]

DD: 2022 08:38 PM

DT: 2022 10:07 PM

## 2022-06-09 NOTE — NUR
TELEPHONE ORDER PER DR ROBERSON:

START ELIQUIS 5MG  PO BID.

ADMIN ELIQUIS TONIGHT AND STOP HEPARIN GTT AFTER ONE HOUR.

ADMIN AM DOSE OF ELIQUIS @ 0700.

PT TO HAVE A VERY LIGHT BREAKFAST TO PREP FOR CARDIOVERSION AROUND 1000 TOMORROW

TELEPHONE ORDERS RBAV

## 2022-06-09 NOTE — NUR
DISCHARGE PLANNING-CHART REVIEW-30DAY READMISSION



CM VISITED WITH PATIENT PRIOR ADMISSION ABOUT DISCHARGE PLANS AND NEEDS.  PATIENT STATED 
PRIOR VISIT ON 6/2/22-6/5/22 THAT HE LIVED IN HIS TRUCK AND IS IND OF ADL'S. THE ADDRESS ON 
FILE IS HIS STEPFATHERS ADDRESS AND IS USED FOR MAIL ONLY. PT DOES ODDS AND END JOBS TO MAKE 
A LITTLE BIT OF MONEY THAT PAYS FOR FOOD. HE HAS NO DME IN PLACE. PATIENT ORDERED A LIFE 
VEST IN A PRIOR VISIT AND HE STATED HE TURNED IT BACK IN AND HE DID NOT NEED IT. PATIENT 
DOES NOT HAVE A PCP, PROVIDER LIST GIVEN.  PATIENT STATED HE HAS APPLIED FOR DISABILITY. 
PATIENT WAS GIVEN A PACKET WITH INFORMATION ON [facility] FOR FOLLOW UP CARE, COMMUNITY 
RESOURCES LIST AND [employer] $4.00 MEDICATION LIST. ALL RESOURCE INFORMATION WAS PROVIDED ON 
PRIOR VISIT 6/2/22. CM ALSO DISCUSSED PATIENT APPLYING FOR [employer] ONLINE AND PROVIDED LINK 
TO APPLY TO [employer] PATIENTS EX-WIFE WHO IS SUPPORTIVE AND ASSISTS.  DISCHARGE PLAN IS FOR 
PATIENT TO D/C BACK TO TRUCK.

## 2022-06-10 VITALS — SYSTOLIC BLOOD PRESSURE: 106 MMHG | DIASTOLIC BLOOD PRESSURE: 55 MMHG

## 2022-06-10 VITALS — SYSTOLIC BLOOD PRESSURE: 125 MMHG | DIASTOLIC BLOOD PRESSURE: 91 MMHG

## 2022-06-10 VITALS — SYSTOLIC BLOOD PRESSURE: 115 MMHG | DIASTOLIC BLOOD PRESSURE: 75 MMHG

## 2022-06-10 VITALS — SYSTOLIC BLOOD PRESSURE: 116 MMHG | DIASTOLIC BLOOD PRESSURE: 77 MMHG

## 2022-06-10 VITALS — SYSTOLIC BLOOD PRESSURE: 125 MMHG | DIASTOLIC BLOOD PRESSURE: 78 MMHG

## 2022-06-10 VITALS — DIASTOLIC BLOOD PRESSURE: 63 MMHG | SYSTOLIC BLOOD PRESSURE: 99 MMHG

## 2022-06-10 VITALS — SYSTOLIC BLOOD PRESSURE: 146 MMHG | DIASTOLIC BLOOD PRESSURE: 69 MMHG

## 2022-06-10 VITALS — DIASTOLIC BLOOD PRESSURE: 63 MMHG | SYSTOLIC BLOOD PRESSURE: 104 MMHG

## 2022-06-10 VITALS — DIASTOLIC BLOOD PRESSURE: 54 MMHG | SYSTOLIC BLOOD PRESSURE: 106 MMHG

## 2022-06-10 VITALS — DIASTOLIC BLOOD PRESSURE: 58 MMHG | SYSTOLIC BLOOD PRESSURE: 88 MMHG

## 2022-06-10 VITALS — DIASTOLIC BLOOD PRESSURE: 63 MMHG | SYSTOLIC BLOOD PRESSURE: 118 MMHG

## 2022-06-10 VITALS — SYSTOLIC BLOOD PRESSURE: 99 MMHG | DIASTOLIC BLOOD PRESSURE: 60 MMHG

## 2022-06-10 VITALS — SYSTOLIC BLOOD PRESSURE: 102 MMHG | DIASTOLIC BLOOD PRESSURE: 67 MMHG

## 2022-06-10 VITALS — DIASTOLIC BLOOD PRESSURE: 68 MMHG | SYSTOLIC BLOOD PRESSURE: 150 MMHG

## 2022-06-10 VITALS — DIASTOLIC BLOOD PRESSURE: 80 MMHG | SYSTOLIC BLOOD PRESSURE: 117 MMHG

## 2022-06-10 VITALS — SYSTOLIC BLOOD PRESSURE: 106 MMHG | DIASTOLIC BLOOD PRESSURE: 64 MMHG

## 2022-06-10 VITALS — DIASTOLIC BLOOD PRESSURE: 62 MMHG | SYSTOLIC BLOOD PRESSURE: 115 MMHG

## 2022-06-10 VITALS — DIASTOLIC BLOOD PRESSURE: 72 MMHG | SYSTOLIC BLOOD PRESSURE: 116 MMHG

## 2022-06-10 VITALS — DIASTOLIC BLOOD PRESSURE: 86 MMHG | SYSTOLIC BLOOD PRESSURE: 143 MMHG

## 2022-06-10 VITALS — DIASTOLIC BLOOD PRESSURE: 83 MMHG | SYSTOLIC BLOOD PRESSURE: 105 MMHG

## 2022-06-10 VITALS — DIASTOLIC BLOOD PRESSURE: 94 MMHG | SYSTOLIC BLOOD PRESSURE: 110 MMHG

## 2022-06-10 VITALS — DIASTOLIC BLOOD PRESSURE: 77 MMHG | SYSTOLIC BLOOD PRESSURE: 127 MMHG

## 2022-06-10 VITALS — DIASTOLIC BLOOD PRESSURE: 74 MMHG | SYSTOLIC BLOOD PRESSURE: 119 MMHG

## 2022-06-10 VITALS — SYSTOLIC BLOOD PRESSURE: 105 MMHG | DIASTOLIC BLOOD PRESSURE: 67 MMHG

## 2022-06-10 VITALS — SYSTOLIC BLOOD PRESSURE: 114 MMHG | DIASTOLIC BLOOD PRESSURE: 66 MMHG

## 2022-06-10 VITALS — SYSTOLIC BLOOD PRESSURE: 122 MMHG | DIASTOLIC BLOOD PRESSURE: 78 MMHG

## 2022-06-10 VITALS — DIASTOLIC BLOOD PRESSURE: 75 MMHG | SYSTOLIC BLOOD PRESSURE: 139 MMHG

## 2022-06-10 VITALS — DIASTOLIC BLOOD PRESSURE: 79 MMHG | SYSTOLIC BLOOD PRESSURE: 123 MMHG

## 2022-06-10 VITALS — SYSTOLIC BLOOD PRESSURE: 117 MMHG | DIASTOLIC BLOOD PRESSURE: 68 MMHG

## 2022-06-10 VITALS — DIASTOLIC BLOOD PRESSURE: 70 MMHG | SYSTOLIC BLOOD PRESSURE: 109 MMHG

## 2022-06-10 VITALS — DIASTOLIC BLOOD PRESSURE: 66 MMHG | SYSTOLIC BLOOD PRESSURE: 107 MMHG

## 2022-06-10 VITALS — DIASTOLIC BLOOD PRESSURE: 60 MMHG | SYSTOLIC BLOOD PRESSURE: 108 MMHG

## 2022-06-10 VITALS — SYSTOLIC BLOOD PRESSURE: 130 MMHG | DIASTOLIC BLOOD PRESSURE: 86 MMHG

## 2022-06-10 VITALS — SYSTOLIC BLOOD PRESSURE: 106 MMHG | DIASTOLIC BLOOD PRESSURE: 78 MMHG

## 2022-06-10 VITALS — SYSTOLIC BLOOD PRESSURE: 125 MMHG | DIASTOLIC BLOOD PRESSURE: 79 MMHG

## 2022-06-10 VITALS — SYSTOLIC BLOOD PRESSURE: 107 MMHG | DIASTOLIC BLOOD PRESSURE: 77 MMHG

## 2022-06-10 VITALS — SYSTOLIC BLOOD PRESSURE: 118 MMHG | DIASTOLIC BLOOD PRESSURE: 77 MMHG

## 2022-06-10 VITALS — SYSTOLIC BLOOD PRESSURE: 124 MMHG | DIASTOLIC BLOOD PRESSURE: 77 MMHG

## 2022-06-10 VITALS — SYSTOLIC BLOOD PRESSURE: 118 MMHG | DIASTOLIC BLOOD PRESSURE: 73 MMHG

## 2022-06-10 VITALS — DIASTOLIC BLOOD PRESSURE: 81 MMHG | SYSTOLIC BLOOD PRESSURE: 119 MMHG

## 2022-06-10 VITALS — SYSTOLIC BLOOD PRESSURE: 113 MMHG | DIASTOLIC BLOOD PRESSURE: 73 MMHG

## 2022-06-10 VITALS — DIASTOLIC BLOOD PRESSURE: 80 MMHG | SYSTOLIC BLOOD PRESSURE: 118 MMHG

## 2022-06-10 VITALS — DIASTOLIC BLOOD PRESSURE: 64 MMHG | SYSTOLIC BLOOD PRESSURE: 116 MMHG

## 2022-06-10 VITALS — SYSTOLIC BLOOD PRESSURE: 110 MMHG | DIASTOLIC BLOOD PRESSURE: 69 MMHG

## 2022-06-10 VITALS — DIASTOLIC BLOOD PRESSURE: 77 MMHG | SYSTOLIC BLOOD PRESSURE: 131 MMHG

## 2022-06-10 VITALS — SYSTOLIC BLOOD PRESSURE: 121 MMHG | DIASTOLIC BLOOD PRESSURE: 92 MMHG

## 2022-06-10 VITALS — DIASTOLIC BLOOD PRESSURE: 69 MMHG | SYSTOLIC BLOOD PRESSURE: 116 MMHG

## 2022-06-10 VITALS — SYSTOLIC BLOOD PRESSURE: 113 MMHG | DIASTOLIC BLOOD PRESSURE: 75 MMHG

## 2022-06-10 VITALS — DIASTOLIC BLOOD PRESSURE: 76 MMHG | SYSTOLIC BLOOD PRESSURE: 138 MMHG

## 2022-06-10 VITALS — SYSTOLIC BLOOD PRESSURE: 106 MMHG | DIASTOLIC BLOOD PRESSURE: 63 MMHG

## 2022-06-10 VITALS — SYSTOLIC BLOOD PRESSURE: 94 MMHG | DIASTOLIC BLOOD PRESSURE: 45 MMHG

## 2022-06-10 VITALS — SYSTOLIC BLOOD PRESSURE: 113 MMHG | DIASTOLIC BLOOD PRESSURE: 60 MMHG

## 2022-06-10 VITALS — DIASTOLIC BLOOD PRESSURE: 75 MMHG | SYSTOLIC BLOOD PRESSURE: 116 MMHG

## 2022-06-10 VITALS — DIASTOLIC BLOOD PRESSURE: 75 MMHG | SYSTOLIC BLOOD PRESSURE: 109 MMHG

## 2022-06-10 VITALS — SYSTOLIC BLOOD PRESSURE: 109 MMHG | DIASTOLIC BLOOD PRESSURE: 70 MMHG

## 2022-06-10 VITALS — DIASTOLIC BLOOD PRESSURE: 60 MMHG | SYSTOLIC BLOOD PRESSURE: 114 MMHG

## 2022-06-10 VITALS — SYSTOLIC BLOOD PRESSURE: 133 MMHG | DIASTOLIC BLOOD PRESSURE: 75 MMHG

## 2022-06-10 VITALS — DIASTOLIC BLOOD PRESSURE: 73 MMHG | SYSTOLIC BLOOD PRESSURE: 112 MMHG

## 2022-06-10 VITALS — DIASTOLIC BLOOD PRESSURE: 60 MMHG | SYSTOLIC BLOOD PRESSURE: 126 MMHG

## 2022-06-10 VITALS — SYSTOLIC BLOOD PRESSURE: 118 MMHG | DIASTOLIC BLOOD PRESSURE: 75 MMHG

## 2022-06-10 VITALS — DIASTOLIC BLOOD PRESSURE: 66 MMHG | SYSTOLIC BLOOD PRESSURE: 116 MMHG

## 2022-06-10 VITALS — SYSTOLIC BLOOD PRESSURE: 124 MMHG | DIASTOLIC BLOOD PRESSURE: 80 MMHG

## 2022-06-10 VITALS — DIASTOLIC BLOOD PRESSURE: 73 MMHG | SYSTOLIC BLOOD PRESSURE: 107 MMHG

## 2022-06-10 VITALS — DIASTOLIC BLOOD PRESSURE: 53 MMHG | SYSTOLIC BLOOD PRESSURE: 84 MMHG

## 2022-06-10 VITALS — DIASTOLIC BLOOD PRESSURE: 62 MMHG | SYSTOLIC BLOOD PRESSURE: 109 MMHG

## 2022-06-10 VITALS — SYSTOLIC BLOOD PRESSURE: 134 MMHG | DIASTOLIC BLOOD PRESSURE: 90 MMHG

## 2022-06-10 VITALS — DIASTOLIC BLOOD PRESSURE: 70 MMHG | SYSTOLIC BLOOD PRESSURE: 125 MMHG

## 2022-06-10 VITALS — DIASTOLIC BLOOD PRESSURE: 88 MMHG | SYSTOLIC BLOOD PRESSURE: 194 MMHG

## 2022-06-10 VITALS — SYSTOLIC BLOOD PRESSURE: 114 MMHG | DIASTOLIC BLOOD PRESSURE: 55 MMHG

## 2022-06-10 VITALS — SYSTOLIC BLOOD PRESSURE: 109 MMHG | DIASTOLIC BLOOD PRESSURE: 63 MMHG

## 2022-06-10 VITALS — DIASTOLIC BLOOD PRESSURE: 69 MMHG | SYSTOLIC BLOOD PRESSURE: 128 MMHG

## 2022-06-10 VITALS — DIASTOLIC BLOOD PRESSURE: 77 MMHG | SYSTOLIC BLOOD PRESSURE: 123 MMHG

## 2022-06-10 VITALS — DIASTOLIC BLOOD PRESSURE: 70 MMHG | SYSTOLIC BLOOD PRESSURE: 105 MMHG

## 2022-06-10 VITALS — DIASTOLIC BLOOD PRESSURE: 62 MMHG | SYSTOLIC BLOOD PRESSURE: 99 MMHG

## 2022-06-10 VITALS — DIASTOLIC BLOOD PRESSURE: 64 MMHG | SYSTOLIC BLOOD PRESSURE: 109 MMHG

## 2022-06-10 VITALS — SYSTOLIC BLOOD PRESSURE: 108 MMHG | DIASTOLIC BLOOD PRESSURE: 57 MMHG

## 2022-06-10 VITALS — SYSTOLIC BLOOD PRESSURE: 124 MMHG | DIASTOLIC BLOOD PRESSURE: 82 MMHG

## 2022-06-10 VITALS — DIASTOLIC BLOOD PRESSURE: 61 MMHG | SYSTOLIC BLOOD PRESSURE: 130 MMHG

## 2022-06-10 VITALS — SYSTOLIC BLOOD PRESSURE: 131 MMHG | DIASTOLIC BLOOD PRESSURE: 85 MMHG

## 2022-06-10 VITALS — SYSTOLIC BLOOD PRESSURE: 131 MMHG | DIASTOLIC BLOOD PRESSURE: 59 MMHG

## 2022-06-10 VITALS — SYSTOLIC BLOOD PRESSURE: 107 MMHG | DIASTOLIC BLOOD PRESSURE: 62 MMHG

## 2022-06-10 VITALS — SYSTOLIC BLOOD PRESSURE: 109 MMHG | DIASTOLIC BLOOD PRESSURE: 64 MMHG

## 2022-06-10 VITALS — SYSTOLIC BLOOD PRESSURE: 145 MMHG | DIASTOLIC BLOOD PRESSURE: 85 MMHG

## 2022-06-10 VITALS — DIASTOLIC BLOOD PRESSURE: 72 MMHG | SYSTOLIC BLOOD PRESSURE: 106 MMHG

## 2022-06-10 VITALS — DIASTOLIC BLOOD PRESSURE: 53 MMHG | SYSTOLIC BLOOD PRESSURE: 107 MMHG

## 2022-06-10 VITALS — DIASTOLIC BLOOD PRESSURE: 74 MMHG | SYSTOLIC BLOOD PRESSURE: 132 MMHG

## 2022-06-10 VITALS — DIASTOLIC BLOOD PRESSURE: 76 MMHG | SYSTOLIC BLOOD PRESSURE: 119 MMHG

## 2022-06-10 VITALS — SYSTOLIC BLOOD PRESSURE: 129 MMHG | DIASTOLIC BLOOD PRESSURE: 85 MMHG

## 2022-06-10 VITALS — SYSTOLIC BLOOD PRESSURE: 107 MMHG | DIASTOLIC BLOOD PRESSURE: 65 MMHG

## 2022-06-10 VITALS — DIASTOLIC BLOOD PRESSURE: 65 MMHG | SYSTOLIC BLOOD PRESSURE: 106 MMHG

## 2022-06-10 RX ADMIN — SACUBITRIL AND VALSARTAN SCH EACH: 24; 26 TABLET, FILM COATED ORAL at 08:08

## 2022-06-10 RX ADMIN — VERICIGUAT SCH MG: 2.5 TABLET, FILM COATED ORAL at 11:27

## 2022-06-10 RX ADMIN — CARVEDILOL SCH MG: 6.25 TABLET, FILM COATED ORAL at 20:41

## 2022-06-10 RX ADMIN — SACUBITRIL AND VALSARTAN SCH EACH: 24; 26 TABLET, FILM COATED ORAL at 08:11

## 2022-06-10 RX ADMIN — SACUBITRIL AND VALSARTAN SCH EACH: 24; 26 TABLET, FILM COATED ORAL at 20:39

## 2022-06-10 RX ADMIN — POTASSIUM CHLORIDE SCH MEQ: 750 TABLET, FILM COATED, EXTENDED RELEASE ORAL at 08:07

## 2022-06-10 RX ADMIN — TORSEMIDE SCH MG: 20 TABLET ORAL at 08:07

## 2022-06-10 RX ADMIN — AMIODARONE HYDROCHLORIDE SCH MG: 200 TABLET ORAL at 20:40

## 2022-06-10 RX ADMIN — DAPAGLIFLOZIN SCH MG: 10 TABLET, FILM COATED ORAL at 08:08

## 2022-06-10 RX ADMIN — APIXABAN SCH MG: 5 TABLET, FILM COATED ORAL at 20:40

## 2022-06-10 RX ADMIN — AMIODARONE HYDROCHLORIDE SCH MG: 200 TABLET ORAL at 08:09

## 2022-06-10 RX ADMIN — CARVEDILOL SCH MG: 6.25 TABLET, FILM COATED ORAL at 08:08

## 2022-06-10 RX ADMIN — SPIRONOLACTONE SCH MG: 25 TABLET, FILM COATED ORAL at 08:08

## 2022-06-10 RX ADMIN — NICOTINE SCH EACH: 21 PATCH, EXTENDED RELEASE TOPICAL at 08:09

## 2022-06-10 RX ADMIN — APIXABAN SCH MG: 5 TABLET, FILM COATED ORAL at 07:20

## 2022-06-10 RX ADMIN — NICOTINE SCH EACH: 21 PATCH, EXTENDED RELEASE TOPICAL at 09:00

## 2022-06-10 NOTE — NUR
DISCHARGE PLAN - [facility]

HEAVENLY IN ICU CONTACTED CM AND INFORMED CM THAT PER DR ROBERSON - PATIENT COULD RECEIVE LIFE 
VEST AND DISCHARGE TOMORROW OR WANTING TO KNOW IF PATIENT'S TIM WOULD COVER ICD ON 
MONDAY.  CM CONTACTED JOSÉ GARCÍA AND PER JOSÉ - TIM WOULD COVER PATIENT'S CARE, BUT 
DOES NOT COVER DEVICE OR PHYSICIAN SERVICES.  PATIENT HAS BEEN SET UP WITH A TIM 
APPLICATION AND SET UP WITH [facility] - REP ED [phone].  MARTY IN [place] 
SPOKE W/DR ROBERSON YESTERDAY AND THIS INFORMATION WAS GIVEN TO HIM.   CM@BEDSIDE AND SPOKE 
WITH PATIENT AND PATIENT REPORTS THAT HE FILLED OUT THE PAPERS FOR [facility] AND HE 
WAS SUPPOSE TO GO SIGN THEM ON THURSDAY, BUT WAS ADMITTED TO HOSPITAL ON WEDNESDAY.  
AGREEABLE FOR CM TO CONTACT [employer] [facility] TO ASSIST WITH ANY INFORMATION. CM CONTACTED [facility]IF COLLINS AND  PER CLIF COLLINS - DR ROBERSON IS WANTING PATIENT TO HAVE A LIFE VEST AND THEY 
BROUGHT HIS PAPERWORK FOR PATIENT ASSISTANCE PROGRAM TO THE HOSPITAL FROM DR MCGRATH AND 
PATIENT DID NOT FEEL THEM OUT DUE TO SWITCHING PROVIDERS.   CM CONTACTED GIANLUCA [email or web address] AND PER GIANLUCA - MARCELO HOUGH THE ONLY THING THEY ARE WAITING ON FOR THE 
[facility] IS PATIENT TO FILL OUT THE PATIENT ASSISTANCE PROGRAM AND ORDER FOR LIFE VEST IS 
UNDER DR MCGRATH FOR A HOME FIT.  CM CONTACTED DR MCGRATH TO SEE IF HE WAS AGREEABLE TO  
CONTINUE WITH LIFE VEST ORDER.  CM INFORMED@THAT TIME PER DR MCGRATH THAT PATIENT HAD 
TRANSFERRED ALL OF HIS CARE TO DR RED ROBERSON AND HIS OFFICE HAS SENT ALL OF THE RECORDS OVER 
TO DR ROBERSON.  DR ROBERSON WOULD NEED TO FILL OUT THE ORDER FOR THE L[facility].  CM CONTACTED 
DR ROBERSON AND INFORMED HIM THAT HE WOULD HAVE TO FILL OUT THE ORDER FOR PATIENT TO HAVE 
[facility] AND WOULD NEED PROGRESS NOTE WITH SAME DX.  DR ROBERSON REPORTS THAT HE WILL COME IN 
TOMORROW AND SIGN THE ORDER.  CM@BEDSIDE AND GAVE PATIENT LIFE [facility] PATIENT ASSISTANCE 
PROGRAM AND HE FILLED THEM OUT AND GAVE THEM TO HEAVENLY, PATIENT'S NURSE.  CM RECIEVED 
PATIENT'S W2 FORM FROM [employer] [facility] TO SEND IN W/PAPERWORK FOR APPROVAL.  CM GAVE COPY OF 
ORDER FOR DR ROBERSON TO FILL OUT TOMORROW AND FAX COVER SHEET W/ACCEPTED DX AND INSTRUCTIONS 
TO FAX NEW ORDER AND PROGRESS NOTE TO [facility] W/PATIENT'S FORM FOR PATIENT ASSITANCE 
PROGRAM AND FINANCIAL INFORMATION.

## 2022-06-10 NOTE — NUR
PAIN MED REQUEST:

PT REQUESTING PRN PAIN MED IF NEEDED OVERNIGHT.

PT DENIES PAIN LEVEL AT THIS TIME.

DR ROBERSON NOTIFIED.

TELEPHONE ORDER GIVEN FOR TRAMADOL 50MG PO TID PRN FOR PAIN 4-6.

TELEPHONE ORDER RBAV.

## 2022-06-10 NOTE — NUR
ELIQUIS 

ELIQUIS 5MG PO BID ADMIN PER ORDER FROM DR ROBERSON TO ADMIN ELIQUIS AT 0700 DUE TO 
CARDIOVERSION AT 1000. PT EDUCATED ON PURPOSE AND SIDE EFFECTS OF ELIQUIS. VERBALIZED 
UNDERSTANDING.

## 2022-06-10 NOTE — PCM.EKG
[facility]

                                       

Test Date:    2022-06-10               Test Time:    10:26:07

Pat Name:     SAMUEL COLLINS            Department:   

Patient ID:   [id number]          Room:         ICU1 A

Gender:       M                        Technician:   

:          1980               Requested By: PEDRO LUIS ROBERSON

Order Number: [id number]           Reading MD:     

                                 Measurements

Intervals                              Axis          

Rate:         77                       P:            112

IA:           155                      QRS:          14

QRSD:         97                       T:            175

QT:           413                                    

QTc:          468                                    

                           Interpretive Statements

Sinus rhythm

Probable left atrial enlargement

Low voltage, extremity leads

Repol abnrm suggests ischemia, anterolateral

Compared to ECG 2022 10:43:18

Low QRS voltage now present

Possible ischemia now present

Atrial fibrillation no longer present

Right-axis deviation no longer present



Please click the below link to view image of tracing.

## 2022-06-10 NOTE — PRM.CONS
CONSULTATION


Scheduled


Apixaban (Eliquis), 5 MG PO BID


Diltiazem Hcl (Cardizem), 60 MG PO TID


Furosemide (Furosemide), 40 MG PO DAILY


Omeprazole Magnesium (Prilosec Otc), 1 TAB PO QD


Potassium Chloride (Potassium Chloride), 20 MEQ PO DAILY24


Sotalol Hcl (Sotalol), 0.5 TAB PO BID





Discontinued Medications


Lisinopril (Lisinopril), 20 MG PO DAILY


   Discontinued Reason: HOLD


Warfarin Sodium (Warfarin Sodium), 10 MG PO DAILY24


   Discontinued Reason: No Longer Taking





VTE


VTE Risk Total Score:  >5


VTE Risk Score


VTE Risk:


Score 0-1 = Low Risk


(Aggressive mobilization; early ambulation; no VTE prophylaxis required)


Score 2: Moderate Risk


(Intermittent/Pneumatic Compression Device OR Lovenox/Heparin/Coumadin)


Score 3-4: High Risk


(Intermittent/Pneumatic Compression Device AND Lovenox/Heparin/Coumadin)


Score  > or =5: Highest Risk


(Intermittent/Pneumatic Compression Device AND Lovenox/Heparin/Coumadin)


Antico:Hep/LMWH/Coum/Xarelto:  Yes











DAVID GASPAR DO            Odell 10, 2022 13:58

## 2022-06-10 NOTE — NUR
CARDIOVERSION

DR ROBERSON TO PT ROOM AT 1010 PT AWAKE AND ALERT NO S/S OF DISTRESS PT ON RA

1014 HEPARIN 5000 UNITS IV, VERSED 3MG IV

1015 FENTANYL 50MCG IV, VERSED 2MG IV 

1019 FENTANYL  25 MCG

1020 200 J SYNCHRONIZED SHOCK GIVEN BY DR ROBERSON, PT RHYTHM EXHIBITS SINUS RHYTHM 80S.

1021 ROMAZICON 0.4MG IV 



NO S/S OF DISTRESS NOTED, PT ALERT AND ORIENTED X3, V/S STABLE. ORDER RECEIVED FROM DR ROBERSON FOR 12 LEAD EKG. WILL CONT WITH PLAN OF CARE.

## 2022-06-11 VITALS — DIASTOLIC BLOOD PRESSURE: 64 MMHG | SYSTOLIC BLOOD PRESSURE: 113 MMHG

## 2022-06-11 VITALS — DIASTOLIC BLOOD PRESSURE: 66 MMHG | SYSTOLIC BLOOD PRESSURE: 123 MMHG

## 2022-06-11 VITALS — SYSTOLIC BLOOD PRESSURE: 109 MMHG | DIASTOLIC BLOOD PRESSURE: 68 MMHG

## 2022-06-11 VITALS — DIASTOLIC BLOOD PRESSURE: 83 MMHG | SYSTOLIC BLOOD PRESSURE: 125 MMHG

## 2022-06-11 VITALS — DIASTOLIC BLOOD PRESSURE: 54 MMHG | SYSTOLIC BLOOD PRESSURE: 106 MMHG

## 2022-06-11 VITALS — DIASTOLIC BLOOD PRESSURE: 98 MMHG | SYSTOLIC BLOOD PRESSURE: 141 MMHG

## 2022-06-11 VITALS — DIASTOLIC BLOOD PRESSURE: 66 MMHG | SYSTOLIC BLOOD PRESSURE: 113 MMHG

## 2022-06-11 VITALS — SYSTOLIC BLOOD PRESSURE: 113 MMHG | DIASTOLIC BLOOD PRESSURE: 81 MMHG

## 2022-06-11 VITALS — SYSTOLIC BLOOD PRESSURE: 119 MMHG | DIASTOLIC BLOOD PRESSURE: 65 MMHG

## 2022-06-11 VITALS — DIASTOLIC BLOOD PRESSURE: 67 MMHG | SYSTOLIC BLOOD PRESSURE: 118 MMHG

## 2022-06-11 VITALS — SYSTOLIC BLOOD PRESSURE: 109 MMHG | DIASTOLIC BLOOD PRESSURE: 62 MMHG

## 2022-06-11 VITALS — SYSTOLIC BLOOD PRESSURE: 125 MMHG | DIASTOLIC BLOOD PRESSURE: 81 MMHG

## 2022-06-11 VITALS — DIASTOLIC BLOOD PRESSURE: 62 MMHG | SYSTOLIC BLOOD PRESSURE: 124 MMHG

## 2022-06-11 VITALS — SYSTOLIC BLOOD PRESSURE: 100 MMHG | DIASTOLIC BLOOD PRESSURE: 64 MMHG

## 2022-06-11 VITALS — SYSTOLIC BLOOD PRESSURE: 120 MMHG | DIASTOLIC BLOOD PRESSURE: 75 MMHG

## 2022-06-11 VITALS — SYSTOLIC BLOOD PRESSURE: 101 MMHG | DIASTOLIC BLOOD PRESSURE: 69 MMHG

## 2022-06-11 VITALS — SYSTOLIC BLOOD PRESSURE: 122 MMHG | DIASTOLIC BLOOD PRESSURE: 71 MMHG

## 2022-06-11 VITALS — DIASTOLIC BLOOD PRESSURE: 63 MMHG | SYSTOLIC BLOOD PRESSURE: 100 MMHG

## 2022-06-11 VITALS — DIASTOLIC BLOOD PRESSURE: 58 MMHG | SYSTOLIC BLOOD PRESSURE: 97 MMHG

## 2022-06-11 VITALS — DIASTOLIC BLOOD PRESSURE: 64 MMHG | SYSTOLIC BLOOD PRESSURE: 135 MMHG

## 2022-06-11 VITALS — SYSTOLIC BLOOD PRESSURE: 114 MMHG | DIASTOLIC BLOOD PRESSURE: 70 MMHG

## 2022-06-11 VITALS — DIASTOLIC BLOOD PRESSURE: 70 MMHG | SYSTOLIC BLOOD PRESSURE: 113 MMHG

## 2022-06-11 VITALS — DIASTOLIC BLOOD PRESSURE: 48 MMHG | SYSTOLIC BLOOD PRESSURE: 92 MMHG

## 2022-06-11 VITALS — DIASTOLIC BLOOD PRESSURE: 44 MMHG | SYSTOLIC BLOOD PRESSURE: 87 MMHG

## 2022-06-11 VITALS — SYSTOLIC BLOOD PRESSURE: 128 MMHG | DIASTOLIC BLOOD PRESSURE: 69 MMHG

## 2022-06-11 VITALS — DIASTOLIC BLOOD PRESSURE: 65 MMHG | SYSTOLIC BLOOD PRESSURE: 119 MMHG

## 2022-06-11 VITALS — SYSTOLIC BLOOD PRESSURE: 116 MMHG | DIASTOLIC BLOOD PRESSURE: 67 MMHG

## 2022-06-11 VITALS — DIASTOLIC BLOOD PRESSURE: 68 MMHG | SYSTOLIC BLOOD PRESSURE: 114 MMHG

## 2022-06-11 VITALS — SYSTOLIC BLOOD PRESSURE: 127 MMHG | DIASTOLIC BLOOD PRESSURE: 57 MMHG

## 2022-06-11 VITALS — SYSTOLIC BLOOD PRESSURE: 123 MMHG | DIASTOLIC BLOOD PRESSURE: 68 MMHG

## 2022-06-11 VITALS — DIASTOLIC BLOOD PRESSURE: 79 MMHG | SYSTOLIC BLOOD PRESSURE: 115 MMHG

## 2022-06-11 VITALS — DIASTOLIC BLOOD PRESSURE: 64 MMHG | SYSTOLIC BLOOD PRESSURE: 111 MMHG

## 2022-06-11 VITALS — SYSTOLIC BLOOD PRESSURE: 110 MMHG | DIASTOLIC BLOOD PRESSURE: 67 MMHG

## 2022-06-11 VITALS — SYSTOLIC BLOOD PRESSURE: 122 MMHG | DIASTOLIC BLOOD PRESSURE: 73 MMHG

## 2022-06-11 VITALS — DIASTOLIC BLOOD PRESSURE: 52 MMHG | SYSTOLIC BLOOD PRESSURE: 107 MMHG

## 2022-06-11 VITALS — DIASTOLIC BLOOD PRESSURE: 69 MMHG | SYSTOLIC BLOOD PRESSURE: 114 MMHG

## 2022-06-11 VITALS — SYSTOLIC BLOOD PRESSURE: 126 MMHG | DIASTOLIC BLOOD PRESSURE: 70 MMHG

## 2022-06-11 VITALS — SYSTOLIC BLOOD PRESSURE: 123 MMHG | DIASTOLIC BLOOD PRESSURE: 74 MMHG

## 2022-06-11 VITALS — DIASTOLIC BLOOD PRESSURE: 82 MMHG | SYSTOLIC BLOOD PRESSURE: 137 MMHG

## 2022-06-11 VITALS — DIASTOLIC BLOOD PRESSURE: 68 MMHG | SYSTOLIC BLOOD PRESSURE: 122 MMHG

## 2022-06-11 VITALS — DIASTOLIC BLOOD PRESSURE: 67 MMHG | SYSTOLIC BLOOD PRESSURE: 105 MMHG

## 2022-06-11 VITALS — SYSTOLIC BLOOD PRESSURE: 116 MMHG | DIASTOLIC BLOOD PRESSURE: 66 MMHG

## 2022-06-11 VITALS — DIASTOLIC BLOOD PRESSURE: 72 MMHG | SYSTOLIC BLOOD PRESSURE: 124 MMHG

## 2022-06-11 VITALS — SYSTOLIC BLOOD PRESSURE: 115 MMHG | DIASTOLIC BLOOD PRESSURE: 66 MMHG

## 2022-06-11 VITALS — SYSTOLIC BLOOD PRESSURE: 115 MMHG | DIASTOLIC BLOOD PRESSURE: 71 MMHG

## 2022-06-11 VITALS — SYSTOLIC BLOOD PRESSURE: 112 MMHG | DIASTOLIC BLOOD PRESSURE: 71 MMHG

## 2022-06-11 VITALS — DIASTOLIC BLOOD PRESSURE: 83 MMHG | SYSTOLIC BLOOD PRESSURE: 118 MMHG

## 2022-06-11 VITALS — SYSTOLIC BLOOD PRESSURE: 120 MMHG | DIASTOLIC BLOOD PRESSURE: 70 MMHG

## 2022-06-11 VITALS — SYSTOLIC BLOOD PRESSURE: 118 MMHG | DIASTOLIC BLOOD PRESSURE: 75 MMHG

## 2022-06-11 VITALS — SYSTOLIC BLOOD PRESSURE: 111 MMHG | DIASTOLIC BLOOD PRESSURE: 56 MMHG

## 2022-06-11 VITALS — DIASTOLIC BLOOD PRESSURE: 61 MMHG | SYSTOLIC BLOOD PRESSURE: 119 MMHG

## 2022-06-11 VITALS — SYSTOLIC BLOOD PRESSURE: 111 MMHG | DIASTOLIC BLOOD PRESSURE: 57 MMHG

## 2022-06-11 RX ADMIN — VERICIGUAT SCH MG: 2.5 TABLET, FILM COATED ORAL at 09:29

## 2022-06-11 RX ADMIN — CARVEDILOL SCH MG: 6.25 TABLET, FILM COATED ORAL at 09:28

## 2022-06-11 RX ADMIN — CARVEDILOL SCH MG: 6.25 TABLET, FILM COATED ORAL at 21:38

## 2022-06-11 RX ADMIN — TORSEMIDE SCH MG: 20 TABLET ORAL at 09:26

## 2022-06-11 RX ADMIN — NICOTINE SCH EACH: 21 PATCH, EXTENDED RELEASE TOPICAL at 09:00

## 2022-06-11 RX ADMIN — AMIODARONE HYDROCHLORIDE SCH MG: 200 TABLET ORAL at 09:27

## 2022-06-11 RX ADMIN — POTASSIUM CHLORIDE SCH MEQ: 750 TABLET, FILM COATED, EXTENDED RELEASE ORAL at 09:28

## 2022-06-11 RX ADMIN — SACUBITRIL AND VALSARTAN SCH EACH: 24; 26 TABLET, FILM COATED ORAL at 09:28

## 2022-06-11 RX ADMIN — AMIODARONE HYDROCHLORIDE SCH MG: 200 TABLET ORAL at 21:38

## 2022-06-11 RX ADMIN — DAPAGLIFLOZIN SCH MG: 10 TABLET, FILM COATED ORAL at 09:28

## 2022-06-11 RX ADMIN — LEVOTHYROXINE SODIUM SCH MCG: 25 TABLET ORAL at 05:40

## 2022-06-11 RX ADMIN — SPIRONOLACTONE SCH MG: 25 TABLET, FILM COATED ORAL at 09:28

## 2022-06-11 RX ADMIN — APIXABAN SCH MG: 5 TABLET, FILM COATED ORAL at 09:27

## 2022-06-11 RX ADMIN — SACUBITRIL AND VALSARTAN SCH EACH: 24; 26 TABLET, FILM COATED ORAL at 21:38

## 2022-06-11 RX ADMIN — APIXABAN SCH MG: 5 TABLET, FILM COATED ORAL at 21:38

## 2022-06-11 NOTE — NUR
TRANSFER

PT TRANSFERRED TO [facility] VIA W/C TO ROOM 329, NO S/S OF DISTRESS NOTED. REPORT GIVEN TO LAILA RICO, RELINQUISHED CARE OF PT.

## 2022-06-11 NOTE — PNH
DATE: 06/10/2022

DICTATOR NAME: Daylin Macias MD

SUBJECTIVE:  The patient finished his IV amiodarone drip and is in 2:1 atrial 

flutter with a rate of 120 and his blood pressure is 130/70, respirations 18 and

afebrile status.  He is breathing well, has a pain in his left foot on the 

dorsum aspect, possibility of gout is a consideration and under conscious 

sedation with 5 mg of Versed and 25 mcg of fentanyl IV along with 150 mg 

amiodarone IV loading before the procedure, 200 joules biphasic shock was given 

and the patient reverted back into regular sinus rhythm with a rate of 70 with a

narrow QRS complex and a 12-lead EKG is acceptable.  He is breathing well.



OBJECTIVE:

VITAL SIGNS:  Stable.

NECK:  No JVD.

LUNGS:  Clear.

HEART:  Sounds normal.



At the present time, [] Verquvo on 2.5 mg once a day and initiated Entresto 

24/26 one tablet [], Farxiga 10 mg once a day, and continue torsemide 20 mg once

a day along with Coreg 6.25 mg twice a day and Eliquis 5 mg [].  We will repeat 

echo to assess his ejection fraction.



IMPRESSION:  Nonischemic dilated cardiomyopathy, 2:1 atrial flutter reverted 

back into regular sinus rhythm, post-cardioversion, acute on chronic systolic 

heart failure, hypertension, hypertensive heart disease, may have obstructive 

sleep apnea, deep venous thrombosis both legs in the past.



PLAN:  At this time, continue same optimized medical therapy.  If restoring the 

regular sinus rhythm, improve his ejection fraction and optimum medical therapy 

has been tried before the ICD can be replaced, his ejection fraction under the 

effect of 2:1 atrial flutter was decreased to almost 20%.  I am sure it will 

improve at the present time and will make a decision in the next 1 or 2 days 

whether he needs an ICD and [] there is improvement in ejection fraction, then 

one probably needs to wait for optimum medical therapy since it is a nonischemic

dilated cardiomyopathy.  According to the data, nonischemic dilated 

cardiomyopathy has to be an optimal medical therapy and he has to be given a 

chance of at least from the recent data 9 months of optimum medical therapy.  

Therefore, one decides to put an ICD if the ejection fraction is still lower 

than 35%.









Daylin Macias MD

DR: ANTOINE/JOSE/TE TID: [id number] RECEIPT: [id number]

DD: 06/10/2022 11:10 AM

DT: 06/11/2022 04:58 AM

## 2022-06-11 NOTE — DIET.OP
Nutrition Asmt/Malnutrit 2-17


Actual Date of Review:  2022


Actual Time Reviewed Asmt:  09:13


Nutritional Screening:  Malnutr/Diet Consult


Diagnosis:  Heart Failure


Pertinent Medical Hx/Surgical:  


GERD, Sleep Apnea, Hyperlipidemia, HTN, Anxiety, Depression, Hx


alcoholism, CHF


Subjective Information:  


40 yo m, presented SOB


Current Diet Order/Nutrition S:  Cardiac


Patient /S.O:  Not Indicated


Pertinent Meds


Coreg, Demadex, Aldactone, Levothyroxine, Ultram


Pertinent Labs


Hgb 160, Hct 148.9, Na+ 139, K+ 3.7, BUN 16, Glu 127H, Ca 8.5, Alb 2.8L, TP 6.0L


Height (Inches):  69


Current Weight:  258


%IBW:  161


Recent Weight Change:  Yes (8# loss x 3/days, 3%, 6/4 wt 276 18# decreased x 

1/wk, 6.5%, significant)


Weight Status:  Morbid Obese


Cultural/Ethnic/Latter-day Michelle:  


unknown


Usual Diet at Home:  unable to obtain


Skin Integrity/Comment:  Yes (skin intact)


Current %PO:  Good(%) (100% x 8 meals)


Calories/Kcals/K-14


Kcals Calculated:  7224-0985


Protein:  Use Current Weight


Protein g/k.0-1.3


Protein Calculated:  117-152


Fluid: ml:  5915-2673 or per MD order current 1500 ml fluid restriction


Nutritional Problem:  Nutr. Problems Present


Problems:  


Overweight/Obesity


Etiology:  


Excessive Energy Intake


Signs/Symptoms:  


BMI above normative for age gender, morbid obesity 38


Interpretation of Weight Loss:  1-2% in 1 week (8# decrease x 3/days, 3%. 

significant, 18# x 1 week, 6.5%, significant)


Fluid Accumulation (Severe):  Mod to Severe Retention


Protein-Calorie Malnutrition:  N/A


Is there a minimum of two crit:  Yes


Malnutrition related to morbid:  BMI>or equal to 40


Malnutrition Related to Morbid:  No


RD Comments:


Significant wt loss @ 1 week (6.5%) and significant wt loss X 3 days (3%), PO 

intake good, (100%). Edema present, Demadex medications contributing to wt loss.


Expected Outcomes


stable w/adequate hydration, labs WNL, skin integrity, nutrition needs met 

within 3/days.


Serum Albumin g/dl:  2.4-3.0 Moderate)


Malnutrtion/Nutrition Risk Edu:  No


low nutrition risk, po intake good


RD Follow-Up Date:  2022


MD Notificiation Needed?:  No











GRACIA AG                  2022 09:18

## 2022-06-11 NOTE — DSH
DATE OF DISCHARGE: 06/11/2022

DICTATOR NAME: Daylin Macias MD

FINAL DIAGNOSES:  Acute-on-chronic systolic heart failure, nonischemic dilated 

cardiomyopathy, ejection fraction 20%, 2:1 atrial flutter, for cardioversion 

chemically after loading with IV amiodarone, right foot gout, obesity, no flow 

obstructive coronary artery disease.



The patient is a candidate for ICD or at least a LifeVest until optimal medical 

therapy is given for a long enough duration to assess if candidate for an ICD.  

Please refer to my history and physical to the point of impression.



HOSPITAL COURSE:  The patient is a 41-year-old white male who has nonischemic 

dilated cardiomyopathy.  He saw Dr. Epperson.  Ejection fraction 20% and he was 

in 2:1 flutter, was admitted on 6/2/2022 and was sent home.  The rate was not 

controlled, placed on Cardizem and sotalol and his rate was still 130, 2:1 

flutter was noted and he was symptomatic with 2 episodes of syncope, having 

blacked out and a LifeVest was to be given to him, but because of his syncope 

and 2:1 flutter, unstable rhythm, he was admitted in the hospital and was 

optimized on a CHF therapy and IV amiodarone loading dose given by over 24 hours

and subsequently DC cardioversion under conscious sedation with 5 mg of Versed 

and 75 mcg of fentanyl was done with biphasic shock with 200 joules and the 

patient converted back into regular sinus rhythm.  His ejection fraction 

post-conversion is still 20%.  It is too early because of electrical stability 

does not achieve mechanical improvement right away and therapy has to be 

optimized.  He, at the present time, will be dismissed on Coreg 6.25 mg twice a 

day, Entresto 24/26 one tablet twice a day, Verquvo 2.5 mg once a day, torsemide

20 mg once a day, Aldactone 25 mg once a day and he is on Levothroid 25 mcg once

a day; TSH was mildly elevated, Eliquis 5 mg twice a day, amiodarone 200 mg 

twice a day and weight reduction.  He got a shot of steroid for his left foot 

gout.  We will put him on allopurinol 300 mg once a day and can take Celebrex 

200 mg on a p.r.n. basis for his left foot pain and could use Ultram 50 mg 3 

times a day on p.r.n. basis for more severe pain and he does not require oxygen,

but he definitely requires a LifeVest because his ejection fraction is 20%.  We 

will see him in the office in 2 weeks.









Daylin Macias MD DR: ANTOINE/COURT TID: [id number] RECEIPT: [id number]

DD: 06/11/2022 09:13 AM

DT: 06/11/2022 07:40 PM

## 2022-06-11 NOTE — PNH
DATE: 06/10/2022

DICTATOR NAME: Daylin Macias MD

SUBJECTIVE:  The patient has nonischemic dilated cardiomyopathy, 20% ejection 

fraction with 2:1 flutter and DC cardioversion with 200 joules was done and he 

reverted back into regular sinus rhythm, still had a 20% ejection fraction, on 

optimal medical therapy on Entresto, Verquvo, carvedilol and Aldactone.  He 

definitely needs LifeVest if he needs an ICD after maximal optimal medical 

therapy over the time period has been established.  His ejection fraction has 

been 20% for very long period of time, probably we will give him another 30-60 

days before putting an ICD, hence we will do a LifeVest in the interim period.









Daylin Macias MD

DR: ANTOINE/OLGA TID: [id number] RECEIPT: [id number]

DD: 06/11/2022 09:04 AM

DT: 06/11/2022 11:55 PM

## 2022-06-12 VITALS — SYSTOLIC BLOOD PRESSURE: 110 MMHG | DIASTOLIC BLOOD PRESSURE: 70 MMHG

## 2022-06-12 VITALS — SYSTOLIC BLOOD PRESSURE: 119 MMHG | DIASTOLIC BLOOD PRESSURE: 75 MMHG

## 2022-06-12 VITALS — DIASTOLIC BLOOD PRESSURE: 72 MMHG | SYSTOLIC BLOOD PRESSURE: 118 MMHG

## 2022-06-12 VITALS — SYSTOLIC BLOOD PRESSURE: 114 MMHG | DIASTOLIC BLOOD PRESSURE: 72 MMHG

## 2022-06-12 RX ADMIN — SACUBITRIL AND VALSARTAN SCH EACH: 24; 26 TABLET, FILM COATED ORAL at 09:25

## 2022-06-12 RX ADMIN — SACUBITRIL AND VALSARTAN SCH EACH: 24; 26 TABLET, FILM COATED ORAL at 20:55

## 2022-06-12 RX ADMIN — POTASSIUM CHLORIDE SCH MEQ: 750 TABLET, FILM COATED, EXTENDED RELEASE ORAL at 09:25

## 2022-06-12 RX ADMIN — APIXABAN SCH MG: 5 TABLET, FILM COATED ORAL at 20:55

## 2022-06-12 RX ADMIN — CARVEDILOL SCH MG: 6.25 TABLET, FILM COATED ORAL at 09:26

## 2022-06-12 RX ADMIN — LEVOTHYROXINE SODIUM SCH MCG: 25 TABLET ORAL at 06:42

## 2022-06-12 RX ADMIN — AMIODARONE HYDROCHLORIDE SCH MG: 200 TABLET ORAL at 20:54

## 2022-06-12 RX ADMIN — VERICIGUAT SCH MG: 2.5 TABLET, FILM COATED ORAL at 09:26

## 2022-06-12 RX ADMIN — CARVEDILOL SCH MG: 6.25 TABLET, FILM COATED ORAL at 20:54

## 2022-06-12 RX ADMIN — PANTOPRAZOLE SODIUM SCH MG: 40 TABLET, DELAYED RELEASE ORAL at 12:42

## 2022-06-12 RX ADMIN — APIXABAN SCH MG: 5 TABLET, FILM COATED ORAL at 09:26

## 2022-06-12 RX ADMIN — NICOTINE SCH EACH: 21 PATCH, EXTENDED RELEASE TOPICAL at 09:26

## 2022-06-12 RX ADMIN — DAPAGLIFLOZIN SCH MG: 10 TABLET, FILM COATED ORAL at 09:25

## 2022-06-12 RX ADMIN — TORSEMIDE SCH MG: 20 TABLET ORAL at 09:26

## 2022-06-12 RX ADMIN — SPIRONOLACTONE SCH MG: 25 TABLET, FILM COATED ORAL at 09:25

## 2022-06-12 RX ADMIN — AMIODARONE HYDROCHLORIDE SCH MG: 200 TABLET ORAL at 09:26

## 2022-06-12 NOTE — PNH
DATE: 06/09/2022

DICTATOR NAME: Daylin Macias MD

SUBJECTIVE:  The patient is doing much better.  He is breathing easier.  He has 

had an aggressive diuresis.  At the present time, his heart rate is about [], 

2:1 atrial flutter is noted with a blood pressure 120/80 and he tolerated his 

Coreg along with Aldactone and torsemide and he took lisinopril at 8:00.  

Tomorrow, we will wait for 48 hours and initiate Entresto 24/26 from 06/10/2022.

 He is on IV heparinization.  Subtherapeutic PTT.  We will increase the dose of 

heparin and give him 2000 units heparin bolus and repeat his PTT.  Cardioversion

on 06/10/2022.



OBJECTIVE:

VITAL SIGNS:  Showing still a rate of [] with atrial flutter.

LUNGS:  Clear.

HEART:  Sounds are normal.  Saturation 96%.



IMPRESSION:  2:1 atrial flutter, recurrent syncope, unstable cardiac rhythm and 

acute on chronic systolic heart failure, nonischemic dilated cardiomyopathy, 

history of hypertension, hypertensive heart disease, history of deep venous 

thrombosis in both legs.



PLAN:  At this time, cardioversion in the morning.  Achieve a therapeutic PTT 

before that and give him Lanoxin 0.25 mg IV today and continue present 

management and initiate Entresto tomorrow.









Daylin Macias MD

DR: ANTOINE/RENETTA/REBECA TID: [id number] RECEIPT: [id number]

DD: 06/09/2022 10:18 AM

DT: 06/10/2022 02:57 AM

## 2022-06-13 VITALS — SYSTOLIC BLOOD PRESSURE: 114 MMHG | DIASTOLIC BLOOD PRESSURE: 68 MMHG

## 2022-06-13 VITALS — SYSTOLIC BLOOD PRESSURE: 129 MMHG | DIASTOLIC BLOOD PRESSURE: 86 MMHG

## 2022-06-13 VITALS — DIASTOLIC BLOOD PRESSURE: 63 MMHG | SYSTOLIC BLOOD PRESSURE: 116 MMHG

## 2022-06-13 VITALS — DIASTOLIC BLOOD PRESSURE: 68 MMHG | SYSTOLIC BLOOD PRESSURE: 114 MMHG

## 2022-06-13 VITALS — SYSTOLIC BLOOD PRESSURE: 96 MMHG | DIASTOLIC BLOOD PRESSURE: 57 MMHG

## 2022-06-13 VITALS — SYSTOLIC BLOOD PRESSURE: 131 MMHG | DIASTOLIC BLOOD PRESSURE: 79 MMHG

## 2022-06-13 LAB
BASOPHILS # BLD AUTO: 0.1 10^3/UL (ref 0–0.1)
BASOPHILS NFR BLD AUTO: 0.8 % (ref 0–0.2)
CO2 BLDA-SCNC: 25.1 MMOL/L (ref 20–32)
EOSINOPHIL # BLD AUTO: 0.1 10^3/UL (ref 0–0.2)
EOSINOPHIL NFR BLD AUTO: 1.9 % (ref 0–5)
ERYTHROCYTE [DISTWIDTH] IN BLOOD BY AUTOMATED COUNT: 15.3 % (ref 11.5–14.5)
GLUCOSE PRE 100 G GLC PO SERPL-MCNC: 90 MG/DL (ref 70–110)
LYMPHOCYTES # BLD AUTO: 2.29 10^3/UL1 (ref 1–4.8)
LYMPHOCYTES NFR BLD AUTO: 30.6 % (ref 24–44)
MCH RBC QN AUTO: 32.1 PG (ref 26–34)
MONOCYTES # BLD AUTO: 1.1 10^3/UL (ref 0.3–0.8)
MONOCYTES NFR BLD AUTO: 14.7 % (ref 5–12)
NEUTROPHILS # BLD AUTO: 3.9 10^3/UL (ref 1.8–7.7)
NEUTROPHILS NFR BLD AUTO: 51.7 % (ref 41–85)
PLATELET # BLD AUTO: 207 10^3/UL (ref 150–400)

## 2022-06-13 RX ADMIN — PANTOPRAZOLE SODIUM SCH MG: 40 TABLET, DELAYED RELEASE ORAL at 09:00

## 2022-06-13 RX ADMIN — SPIRONOLACTONE SCH MG: 25 TABLET, FILM COATED ORAL at 09:00

## 2022-06-13 RX ADMIN — APIXABAN SCH MG: 5 TABLET, FILM COATED ORAL at 08:59

## 2022-06-13 RX ADMIN — SACUBITRIL AND VALSARTAN SCH EACH: 24; 26 TABLET, FILM COATED ORAL at 09:00

## 2022-06-13 RX ADMIN — VERICIGUAT SCH MG: 2.5 TABLET, FILM COATED ORAL at 09:00

## 2022-06-13 RX ADMIN — CARVEDILOL SCH MG: 6.25 TABLET, FILM COATED ORAL at 09:00

## 2022-06-13 RX ADMIN — POTASSIUM CHLORIDE SCH MEQ: 750 TABLET, FILM COATED, EXTENDED RELEASE ORAL at 08:59

## 2022-06-13 RX ADMIN — LEVOTHYROXINE SODIUM SCH MCG: 25 TABLET ORAL at 06:04

## 2022-06-13 RX ADMIN — VERICIGUAT SCH MG: 2.5 TABLET, FILM COATED ORAL at 09:24

## 2022-06-13 RX ADMIN — NICOTINE SCH EACH: 21 PATCH, EXTENDED RELEASE TOPICAL at 09:02

## 2022-06-13 RX ADMIN — TORSEMIDE SCH MG: 20 TABLET ORAL at 09:00

## 2022-06-13 RX ADMIN — DAPAGLIFLOZIN SCH MG: 10 TABLET, FILM COATED ORAL at 08:59

## 2022-06-13 RX ADMIN — AMIODARONE HYDROCHLORIDE SCH MG: 200 TABLET ORAL at 08:59

## 2022-06-13 NOTE — NUR
DISCHARGE PLAN - [employer] WILL FIT@HOME

CM RECEIVED INFORMATION FROM GIANLUCA [email or web address] AND INFORMED THAT THEY WERE CONTINUING 
TO WORK ON THE [employer] AND SHE WOULD HAVE TO SCHEDULE A FITTER OUT OF [place] SINCE THE 
[employer] FITTER IN [place] WAS OFF@4PM TODAY.  PATIENT WOULD HAVE TO HAVE DEPOSIT OF $299 WITH 
$250 REFUNDABLE WHEN THE UNIT WAS TURNED BACK IN. CM SPOKE WITH PATIENT AND INFORMED HIM OF 
[employer] WILL BE CALLING HIM TO SET UP FINANCIAL ARRANGEMENTS.  PATIENT REPORTS THAT HE 
DOESN'T HAVE THE DEPOSIT AND "I REFUSE TO ASK FOR ANYONE TO HELP ME IN THIS TIME."  CM 
CONTACTED DR ROBERSON AND SPOKE WITH HIM AND DR ROBERSON OK WITH PATIENT BEING DISCHARGED AND 
MAY DISCHARGE HOME WITH [employer] FOLLOWING UP WITH PATIENT OUTPATIENT.  DR ROBERSON SAID THAT 
PATIENT HAS F/U APPT IN 2 WEEKS WITH HIM AND HE HAS PROVIDED PATIENT WITH ALL THE 
MEDICATIONS AND IS SETTING UP A TIM TO COVER THE ICD AND PLANS TO HAVE THAT DONE 
SHORTLY.  CM CONTACTED GIANLUCA [email or web address] AND THEY WILL CONTINUE TO FOLLOW UP WITH 
PATIENT ON AN OUTPATIENT BASIS.  CM @BEDSIDE AND UPDATED PATIENT ON PLAN OF [employer] 
FOLLOWING UP WITH PATIENT ON OUTPATIENT BASIS.  PATIENT AGREEABLE AND REPORTS THAT HE IS 
READY TO DISCHARGE TODAY.  PATIENT VERBALIZED UNDERSTANDING THAT [employer] WOULD F/U WITH 
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  DENIES ANY FURTHER CM 
NEEDS.

## 2022-06-13 NOTE — NUR
DISCHARGE PLAN - [facility]

TINO RECEIVED VM FROM [email or web address] [email or web address] AND FORM FOR PATIENT ASSISTANCE PROGRAM CAN NOT 
HAVE A PATIENT STICKER FOR THE NAME AND DUE TO DATE OF BIRTH SCRATCHED OUT - PATIENT WILL 
HAVE TO FILL OUT NEW FORM.  GIANLUCA [email or web address] EMAILED TINO NEW FORM, BUT CM HAS NOT 
RECEIVED NEW FORM@THIS TIME.  TINO CONTACTED GIANLUCA BARAJAS AND SHE IS FAXING NEW FORM TO 
CM@THIS TIME AND MED SURG.  AWAITING FOR NEW FORM@THIS TIME. 

-------------------------------------------------------------------------------

Addendum: 06/13/22 at 1604 by Alexandra Rolle RN,Case Managemen RN

-------------------------------------------------------------------------------

CM INFORMED @THIS TIME PER GIANLUCA [email or web address] - FORM HAS TO BE ORIGINATED @[employer] 
FOR FINANCIAL ASSISTANCE AND SENT FOR PATIENT TO COMPLETE.  PREFILLED FORM COMPLETED@THIS 
TIME AND FAXED INTO [employer]@[phone].  FAX CONFIRMATION CONFIRMED AS COMPLETE.  
AWAITING UPDATE FROM [employer]@THIS TIME.

## 2022-06-13 NOTE — DSH
DATE OF DISCHARGE: 06/13/2022

DICTATOR NAME: Daylin Macias MD

FINAL DIAGNOSES:  2:1 atrial flutter, post-cardioversion reverted back into 

regular sinus rhythm after giving a loading dose of IV amiodarone and 200 joules

biphasic shock, acute on chronic systolic heart failure, nonischemic dilated 

cardiomyopathy, ejection fraction is 20%, obesity.  Please refer to my history 

and physical to the point of my impression.



HOSPITAL COURSE:  The patient is a 41-year-old white male who came in with 2:1 

atrial flutter and was being treated with Cardizem and sotalol and his rate was 

very high and he had collapsed twice and apparently he was brought to ICU and 

started on IV amiodarone drip and followed by 200 joules biphasic shock under 

full anticoagulation and he reverted back into regular sinus rhythm and his CHF 

therapy was optimized and he will be sent home on LifeVest and his creatinine 

has gone up to 1.48.  We will follow the creatinine in the office in two weeks 

and he will be on amiodarone 200 mg twice a day, Coreg 6.25 mg twice a day, 

Farxiga 10 mg once a day, Levothroid 25 mcg once a day because of his mild 

elevation of TSH due to amiodarone, Entresto 24/26 one tablet twice a day, 

Aldactone 25 mg once a day, torsemide 20 mg once a day and Verquvo 2.5 mg daily 

and I stopped his Cardizem, Lasix, omeprazole, sotalol and given Protonix 40 mg 

once a day, see him back in the office in two weeks.









Daylin Macias MD DR: ANTOINE/PEDRO TID: [id number] RECEIPT: [id number]

DD: 06/13/2022 10:03 AM

DT: 06/13/2022 01:43 PM

## 2022-06-16 NOTE — ECHO
DATE OF SERVICE: 06/10/2022

DICTATOR NAME: Daylin Macias MD

ECHOCARDIOGRAPHY REPORT



INDICATION:  A 41-year-old male, 2:1 atrial flutter, post-cardioversion, 

nonischemic dilated cardiomyopathy, acute on chronic systolic heart failure, 

assess LV function.  Post-conversion into regular sinus rhythm.



PRIMARY PHYSICIAN:  Dr. Macias.



FINDINGS:  Mitral valve shows mitral regurgitation 2.5 meters velocity, normal E

to A ratio and aorta is normal with aortic incompetence with a pressure halftime

of 730 milliseconds and adequate aortic valve opening.  Abnormal EPSS is noted 

on mitral valve.  M-mode echo - tricuspid regurgitation is 2.3 meters.  The 

right ventricular systolic pressure is 31 mm and pulmonary incompetence with a 

pressure halftime of 360 milliseconds consistent with 2+ pulmonary incompetence 

with marked right ventricular enlargement of 4.36 cm.  Right atrial enlargement 

in 4-chamber view to 5.7 cm.  Left atrium is enlarged in 4-chamber view 

longitudinally to 6.59 cm.  Left ventricle is enlarged to 5.91 cm, end-diastole 

dimension 5.26 cm, end-systolic dimension with abnormal EPSS and severe global 

hypokinesis, 20% ejection fraction, and increased end-diastolic volume to 187 

mL, IVC is top normal size.  Hence, picture consistent with global hypokinesis 

of the left ventricle with enlargement of the left ventricle without any 

thrombus and a severe nonischemic dilated cardiomyopathy with ejection fraction 

of 20%.  Biatrial enlargement, significant right ventricular enlargement and 

pulmonary incompetence due to pulmonary hypertension and mild aortic 

incompetence and low cardiac output state.  The patient was converted 

electrically and mechanical improvement, will require optimum medical therapy 

and hence reassess him after 3 months.









Daylin Macias MD

DR: ANTOINE/JOSE/MARISABEL TID: [id number] RECEIPT: [id number]

DD: 06/14/2022 03:45 PM

DT: 06/15/2022 04:51 AM

## 2022-06-27 ENCOUNTER — HOSPITAL ENCOUNTER (OUTPATIENT)
Dept: HOSPITAL 65 - LAB | Age: 42
Discharge: HOME | End: 2022-06-27
Attending: NURSE PRACTITIONER
Payer: SELF-PAY

## 2022-06-27 DIAGNOSIS — I50.9: Primary | ICD-10-CM

## 2022-06-27 LAB
CO2 BLDA-SCNC: 29 MMOL/L (ref 20–32)
ERYTHROCYTE [DISTWIDTH] IN BLOOD BY AUTOMATED COUNT: 14.9 % (ref 11.5–14.5)
GLUCOSE PRE 100 G GLC PO SERPL-MCNC: 99 MG/DL (ref 70–110)
MCH RBC QN AUTO: 31.3 PG (ref 26–34)
PLATELET # BLD AUTO: 269 10^3/UL (ref 150–400)

## 2022-06-27 PROCEDURE — 85027 COMPLETE CBC AUTOMATED: CPT

## 2022-06-27 PROCEDURE — 83735 ASSAY OF MAGNESIUM: CPT

## 2022-06-27 PROCEDURE — 36415 COLL VENOUS BLD VENIPUNCTURE: CPT

## 2022-06-27 PROCEDURE — 80053 COMPREHEN METABOLIC PANEL: CPT

## 2022-06-30 ENCOUNTER — HOSPITAL ENCOUNTER (OUTPATIENT)
Dept: HOSPITAL 65 - RAD | Age: 42
Discharge: HOME | End: 2022-06-30
Attending: SPECIALIST
Payer: SELF-PAY

## 2022-06-30 DIAGNOSIS — I42.9: ICD-10-CM

## 2022-06-30 DIAGNOSIS — I50.22: Primary | ICD-10-CM

## 2022-06-30 PROCEDURE — 93306 TTE W/DOPPLER COMPLETE: CPT

## 2022-07-07 NOTE — ECHO
DATE OF SERVICE: 06/30/2022

DICTATOR NAME: Daylin Macias MD

ECHOCARDIOGRAPHY REPORT



INDICATIONS:  A 41-year-old male, cardiomyopathy, chronic systolic heart 

failure, post-cardioversion of atrial fibrillation.  Echo to assess improvement 

in LV ejection fraction following optimization of medical therapy.  Echo was 

done in [facility].



PRIMARY PHYSICIAN:  Dr. Macias



FINDINGS:  Mitral valve shows reversal of E to A ratio.  The patient is now in 

regular sinus rhythm and has an A wave and grade 1 diastolic dysfunction noted. 

Aorta is normal with normal aortic valve opening of 3.56 square cm and mild 

aortic incompetence with a pressure halftime of 619 milliseconds.  Tricuspid 

valve shows minimal tricuspid regurgitation.  Right ventricular systolic 

pressure is only 13 mm.  Right ventricle is enlarged to 3.62 cm, which is 

accurate reduction in the size compared to the previous echo.  Right atrium on 

4-chamber longitudinal dimension is 5.6 cm.  Left atrium is 5.6 cm.  Left 

ventricle is markedly reduced in size from the previous echo to 5.65 cm, 

although it is mildly enlarged, this is end diastolic dimension; end systolic 

dimension is 4.48 cm.  There is septal thickening.  Mild global hypokinesis with

an ejection fraction of 41-42%, significantly improved compared to the previous 

ejection fraction of 20% and no pericardial effusion is noted, hence marked 

improvement in the LV systolic function following the optimization on medical 

therapy and conversion of rapid atrial flutter back into regular sinus rhythm 

with grade 1 diastolic dysfunction with global hypokinesis and reduction in LV 

size.  No thrombus in any other cardiac chambers.









Daylin Macias MD

DR: ANTOINE/JOSE/TE TID: [id number] RECEIPT: [id number]

DD: 07/06/2022 11:14 AM

DT: 07/06/2022 10:59 PM

## 2022-07-13 ENCOUNTER — HOSPITAL ENCOUNTER (OUTPATIENT)
Dept: HOSPITAL 65 - LAB | Age: 42
Discharge: HOME | End: 2022-07-13
Attending: SPECIALIST
Payer: SELF-PAY

## 2022-07-13 DIAGNOSIS — E78.00: ICD-10-CM

## 2022-07-13 DIAGNOSIS — I10: Primary | ICD-10-CM

## 2022-07-13 LAB
CO2 BLDA-SCNC: 26 MMOL/L (ref 20–32)
ERYTHROCYTE [DISTWIDTH] IN BLOOD BY AUTOMATED COUNT: 15.6 % (ref 11.5–14.5)
GLUCOSE PRE 100 G GLC PO SERPL-MCNC: 115 MG/DL (ref 70–110)
MCH RBC QN AUTO: 30.6 PG (ref 26–34)
PLATELET # BLD AUTO: 183 10^3/UL (ref 150–400)

## 2022-07-13 PROCEDURE — 83735 ASSAY OF MAGNESIUM: CPT

## 2022-07-13 PROCEDURE — 85027 COMPLETE CBC AUTOMATED: CPT

## 2022-07-13 PROCEDURE — 36415 COLL VENOUS BLD VENIPUNCTURE: CPT

## 2022-07-13 PROCEDURE — 80053 COMPREHEN METABOLIC PANEL: CPT

## 2022-07-13 PROCEDURE — 80061 LIPID PANEL: CPT

## 2022-07-13 PROCEDURE — 84443 ASSAY THYROID STIM HORMONE: CPT

## 2022-08-01 ENCOUNTER — HOSPITAL ENCOUNTER (INPATIENT)
Dept: HOSPITAL 65 - ER | Age: 42
LOS: 3 days | Discharge: HOME | DRG: 683 | End: 2022-08-04
Attending: SPECIALIST | Admitting: SPECIALIST
Payer: SELF-PAY

## 2022-08-01 VITALS — DIASTOLIC BLOOD PRESSURE: 46 MMHG | SYSTOLIC BLOOD PRESSURE: 81 MMHG

## 2022-08-01 VITALS — SYSTOLIC BLOOD PRESSURE: 109 MMHG | DIASTOLIC BLOOD PRESSURE: 61 MMHG

## 2022-08-01 VITALS — SYSTOLIC BLOOD PRESSURE: 100 MMHG | DIASTOLIC BLOOD PRESSURE: 59 MMHG

## 2022-08-01 VITALS — DIASTOLIC BLOOD PRESSURE: 48 MMHG | SYSTOLIC BLOOD PRESSURE: 85 MMHG

## 2022-08-01 VITALS — DIASTOLIC BLOOD PRESSURE: 45 MMHG | SYSTOLIC BLOOD PRESSURE: 92 MMHG

## 2022-08-01 VITALS — DIASTOLIC BLOOD PRESSURE: 53 MMHG | SYSTOLIC BLOOD PRESSURE: 116 MMHG

## 2022-08-01 VITALS — WEIGHT: 226 LBS | BODY MASS INDEX: 33.47 KG/M2 | HEIGHT: 69 IN

## 2022-08-01 DIAGNOSIS — E86.0: ICD-10-CM

## 2022-08-01 DIAGNOSIS — N17.9: Primary | ICD-10-CM

## 2022-08-01 DIAGNOSIS — Z79.899: ICD-10-CM

## 2022-08-01 DIAGNOSIS — F41.9: ICD-10-CM

## 2022-08-01 DIAGNOSIS — I42.0: ICD-10-CM

## 2022-08-01 DIAGNOSIS — Z20.822: ICD-10-CM

## 2022-08-01 DIAGNOSIS — R79.89: ICD-10-CM

## 2022-08-01 DIAGNOSIS — I50.22: ICD-10-CM

## 2022-08-01 DIAGNOSIS — I11.0: ICD-10-CM

## 2022-08-01 DIAGNOSIS — E78.5: ICD-10-CM

## 2022-08-01 DIAGNOSIS — Z87.01: ICD-10-CM

## 2022-08-01 DIAGNOSIS — Z79.01: ICD-10-CM

## 2022-08-01 DIAGNOSIS — Z82.5: ICD-10-CM

## 2022-08-01 DIAGNOSIS — I95.9: ICD-10-CM

## 2022-08-01 DIAGNOSIS — Z87.891: ICD-10-CM

## 2022-08-01 DIAGNOSIS — R55: ICD-10-CM

## 2022-08-01 DIAGNOSIS — E86.1: ICD-10-CM

## 2022-08-01 DIAGNOSIS — E66.9: ICD-10-CM

## 2022-08-01 DIAGNOSIS — E03.9: ICD-10-CM

## 2022-08-01 DIAGNOSIS — F32.A: ICD-10-CM

## 2022-08-01 DIAGNOSIS — D75.1: ICD-10-CM

## 2022-08-01 DIAGNOSIS — I48.0: ICD-10-CM

## 2022-08-01 DIAGNOSIS — F10.20: ICD-10-CM

## 2022-08-01 DIAGNOSIS — G47.33: ICD-10-CM

## 2022-08-01 LAB
BASOPHILS # BLD AUTO: 0 10^3/UL (ref 0–0.1)
BASOPHILS # BLD AUTO: 0 10^3/UL (ref 0–0.1)
BASOPHILS NFR BLD AUTO: 0.3 % (ref 0–0.2)
BASOPHILS NFR BLD AUTO: 0.5 % (ref 0–0.2)
BILIRUB UR STRIP.AUTO-MCNC: NEGATIVE MG/DL
CO2 BLDA-SCNC: 18.4 MMOL/L (ref 20–32)
CO2 BLDA-SCNC: 21.1 MMOL/L (ref 20–32)
EOSINOPHIL # BLD AUTO: 0 10^3/UL (ref 0–0.2)
EOSINOPHIL # BLD AUTO: 0 10^3/UL (ref 0–0.2)
EOSINOPHIL NFR BLD AUTO: 0.2 % (ref 0–5)
EOSINOPHIL NFR BLD AUTO: 0.5 % (ref 0–5)
ERYTHROCYTE [DISTWIDTH] IN BLOOD BY AUTOMATED COUNT: 16.3 % (ref 11.5–14.5)
ERYTHROCYTE [DISTWIDTH] IN BLOOD BY AUTOMATED COUNT: 16.8 % (ref 11.5–14.5)
GLUCOSE PRE 100 G GLC PO SERPL-MCNC: 134 MG/DL (ref 70–110)
GLUCOSE PRE 100 G GLC PO SERPL-MCNC: 143 MG/DL (ref 70–110)
LYMPHOCYTES # BLD AUTO: 1.4 10^3/UL1 (ref 1–4.8)
LYMPHOCYTES # BLD AUTO: 1.9 10^3/UL1 (ref 1–4.8)
LYMPHOCYTES NFR BLD AUTO: 31.9 % (ref 24–44)
LYMPHOCYTES NFR BLD AUTO: 32.9 % (ref 24–44)
MCH RBC QN AUTO: 30.2 PG (ref 26–34)
MCH RBC QN AUTO: 30.4 PG (ref 26–34)
MONOCYTES # BLD AUTO: 0.6 10^3/UL (ref 0.3–0.8)
MONOCYTES # BLD AUTO: 0.6 10^3/UL (ref 0.3–0.8)
MONOCYTES NFR BLD AUTO: 10.6 % (ref 5–12)
MONOCYTES NFR BLD AUTO: 12.9 % (ref 5–12)
NEUTROPHILS # BLD AUTO: 2.3 10^3/UL (ref 1.8–7.7)
NEUTROPHILS # BLD AUTO: 3.4 10^3/UL (ref 1.8–7.7)
NEUTROPHILS NFR BLD AUTO: 53.5 % (ref 41–85)
NEUTROPHILS NFR BLD AUTO: 56.5 % (ref 41–85)
PLATELET # BLD AUTO: 153 10^3/UL (ref 150–400)
PLATELET # BLD AUTO: 194 10^3/UL (ref 150–400)
UROBILINOGEN UR QL STRIP.AUTO: 0.2 E.U./DL

## 2022-08-01 PROCEDURE — 85025 COMPLETE CBC W/AUTO DIFF WBC: CPT

## 2022-08-01 PROCEDURE — 81003 URINALYSIS AUTO W/O SCOPE: CPT

## 2022-08-01 PROCEDURE — 71045 X-RAY EXAM CHEST 1 VIEW: CPT

## 2022-08-01 PROCEDURE — 80053 COMPREHEN METABOLIC PANEL: CPT

## 2022-08-01 PROCEDURE — 36415 COLL VENOUS BLD VENIPUNCTURE: CPT

## 2022-08-01 PROCEDURE — 84443 ASSAY THYROID STIM HORMONE: CPT

## 2022-08-01 PROCEDURE — 93005 ELECTROCARDIOGRAM TRACING: CPT

## 2022-08-01 PROCEDURE — 99285 EMERGENCY DEPT VISIT HI MDM: CPT

## 2022-08-01 PROCEDURE — 85610 PROTHROMBIN TIME: CPT

## 2022-08-01 PROCEDURE — 83880 ASSAY OF NATRIURETIC PEPTIDE: CPT

## 2022-08-01 PROCEDURE — 84484 ASSAY OF TROPONIN QUANT: CPT

## 2022-08-01 PROCEDURE — 87426 SARSCOV CORONAVIRUS AG IA: CPT

## 2022-08-01 PROCEDURE — 80307 DRUG TEST PRSMV CHEM ANLYZR: CPT

## 2022-08-01 RX ADMIN — SODIUM CHLORIDE SCH MLS/HR: 0.9 INJECTION, SOLUTION INTRAVENOUS at 22:54

## 2022-08-01 NOTE — PCM.EKG
[facility]

                                       

Test Date:    2022               Test Time:    12:26:27

Pat Name:     SAMUEL COLLINS            Department:   

Patient ID:   [id number]          Room:          

Gender:       M                        Technician:   CA

:          1980               Requested By: VILLA JAMESON

Order Number: [id number]           Reading MD:     

                                 Measurements

Intervals                              Axis          

Rate:         73                       P:            62

LA:           153                      QRS:          60

QRSD:         116                      T:            46

QT:           405                                    

QTc:          447                                    

                           Interpretive Statements

Sinus rhythm

Nonspecific intraventricular conduction delay

Compared to ECG 06/10/2022 10:26:07

Intraventricular conduction delay now present

Early repolarization no longer present

Possible ischemia no longer present



Please click the below link to view image of tracing.

## 2022-08-01 NOTE — DIREP
PROCEDURE:CHEST 1 VIEW

 

COMPARISON:[facility], CR, XRAY CHEST 2 VWS, 06/08/2022, 03:47 

PM.

 

INDICATIONS:sob

 

FINDINGS:

LUNGS/PLEURA:No significant pulmonary parenchymal abnormalities. No effusions.

VASCULATURE:Normal.  Unremarkable pulmonary vasculature.

CARDIAC:Normal.  No cardiac silhouette abnormality or cardiomegaly. 

MEDIASTINUM:Normal.  No visible mass or adenopathy. 

BONES:Normal.  No fracture or visible bony lesion. 

OTHER:Negative.  

 

CONCLUSION:No acute pulmonary process.

 

 

Dictated by: TANIKA Mccracken M.D. on 08/01/2022 at 12:45 PM     

Electronically Signed By: TANIKA Mccracken M.D. on 08/01/2022 at 12:45 PM

## 2022-08-01 NOTE — HPH
ADMIT DATE: 2022

DICTATOR NAME: Daylin Macias MD

CHIEF COMPLAINT:  Syncope, collapse, passed out, marked weakness, low blood 

pressure.



HISTORY OF PRESENT ILLNESS:  The patient is a 41-year-old white male with known 

history of nonischemic dilated cardiomyopathy, chronic systolic heart failure, 

on optimal medical therapy.  He had atrial fibrillation, was cardioverted 

electrically and consequently with restoration of regular sinus rhythm and 

optimization of CHF therapy.  His EF improved from 20% to almost 40-42% by with 

echo with an another 6-8 weeks and has done well on poly therapy and he has 

started working cutting lawn and has been exposed to outside heat and sun and 

apparently he has not urinated in 2 days, very little urination and he passed 

out, came with a blood pressure of 80/50 with marked elevation of creatinine to 

4.1 and BUN over 100, suggestive of prerenal azotemia and severe dehydration and

hypovolemia, hypotension with underlying drugs, especially Farxiga causing some 

of the problems.  Hence, he was admitted for IV hydration and stopping his 

medications and reassessing his kidney status.



ALLERGIES:  None known.



MEDICATIONS:  Entresto 49/51 mg one tablet twice a day, amiodarone 200 mg once a

day, Levothroid 25 mcg once a day, torsemide 20 mg once a day, potassium 10 mEq 

once a day, Coreg 6.25 mg twice a day, Aldactone 25 mg once a day, Eliquis 5 mg 

twice a day, Farxiga 10 mg once a day, Verquvo 2.5 mg once a day, Farxiga 10 mg 

once a day, and Protonix 40 mg once a day.



PAST MEDICAL HISTORY:  History of chronic systolic heart failure, nonischemic 

dilated cardiomyopathy, atrial fibrillation, anxiety, depression, obesity, 

hyperlipidemia, TANESHA.  He was in the hospital, had atrial fibrillation several 

weeks ago.  No history of any cancer.  Has had pneumonia in the past.  No COVID 

vaccine.  Claims he has not had any COVID, has history of DVT and pulmonary 

emboli in .



SOCIAL HISTORY:  Prior history of severe alcoholism, heavy smoker and has 

significant history of smoking, but he says he has cut down at the present time.



FAMILY HISTORY:  Positive for COPD.  Father had COPD and heart problems in the 

family and grandparents  of massive MI.



REVIEW OF SYSTEMS:  Reveals that the dizziness, lightheadedness, weak spells, 

passing out and decreased urination.



PHYSICAL EXAMINATION:

GENERAL:  I saw him alert, awake, oriented, height 175 cm and weight was 102 kg.

VITAL SIGNS:  His pulse was 90, blood pressure 80/50, respirations 18-20.

HEENT:  Unremarkable.

NECK:  No JVD, no carotid bruits.  Scattered rhonchi noted bilaterally.

CARDIOVASCULAR:  Heart sounds S1, S2 normal.

ABDOMEN:  Rounded, no organomegaly.

EXTREMITIES:  Distal pulses poorly felt because of hypotension and hypovolemia. 

No dependent edema was noted.

NEUROLOGIC:  Intact.



LABORATORY DATA:  Showed 6000 white count, 17.4 hemoglobin secondary 

polycythemia due to heavy smoking.  BUN is 115, creatinine 4.1 with a 

significant rise from his previous chemistries and his bilirubin is up to 2.8 

and SGOT 76, alkaline phosphatase is 149.  ProBNP is 271 and TSH was 3.4.  

Sodium is 126, probably depletion and dehydration.  COVID serology is negative. 

Chest x-ray was unremarkable.  EKG was showing regular sinus rhythm, nonspecific

ST-T wave changes.



IMPRESSION:  Acute kidney injury, syncope, collapse, hypotension, hypovolemia, 

may be related to dehydration due to diuretics and being exposed to excessive 

heat, history of nonischemic dilated cardiomyopathy, chronic systolic heart 

failure, on optimal medical therapy.



PLAN:  At this time, admit the patient, hold all his medications, fluid bolus 

and IV fluids and clinical followup and further management depending on the 

clinical course.









Daylin Macias MD DR: NEIL TID: [id number] RECEIPT: [id number]

DD: 2022 07:55 PM

DT: 2022 09:18 PM

## 2022-08-01 NOTE — ER.PDOC
General


Chief Complaint:  Syncope


Stated Complaint:  PASSING OUT


Time seen by MD:  12:30


Source:  patient


Exam Limitations:  no limitations





History of Present Illness


Initial Comments


41-year-old male with a history of cardiomyopathy decreased ejection fraction, 

he states that he has been feeling weak for the last couple of days.  He 

describes near syncopal episodes multiple times, fading to black, feeling 

sweaty.  Currently has no chest pain, no shortness of breath.  No leg edema or 

pain.  No fever or chills.  He states he does work outside in the heat.


Timing/Prior Episodes:  multiple episodes today


Symptoms Prior to Episode:  blurred vision


Precipitating Factors:  standing


Loss of Consciousness:  Brief (Seconds)


Location of Injury:  None


Allergies:  


Coded Allergies:  


     No Known Allergies (Unverified , 12/17/16)


Home Meds


Active Scripts


Levothyroxine Sodium (LEVOTHYROXINE SODIUM) 25 Mcg Tablet, 25 MCG PO ACB for 30 

Days, TAB


   Prov:PEDRO LUIS ROBERSON MD         6/13/22


Dapagliflozin Propanediol (Farxiga) 10 Mg Tablet, 10 MG PO DAILY for 30 Days, 

TAB


   Prov:PEDRO LUIS ROBERSON MD         6/13/22


Pantoprazole Sodium (PROTONIX) 40 Mg Tablet.dr, 40 MG PO DAILY for 30 Days


   Prov:PEDRO LUIS ROBERSON MD         6/13/22


Torsemide (TORSEMIDE) 20 Mg Tablet, 20 MG PO DAILY for 30 Days, TAB


   Prov:PEDRO LUIS ROBERSON MD         6/13/22


Vericiguat (Verquvo) 2.5 Mg Tablet, 2.5 MG PO DAILY for 30 Days, TAB


   Prov:PEDRO LUIS ROBERSON MD         6/13/22


Spironolactone 25MG (ALDACTONE 25MG) 25 Mg Tablet, 25 MG PO DAILY for 30 Days, 

TAB


   Prov:PEDRO LUIS ROBERSON MD         6/13/22


Sacubitril/Valsartan (Entresto 24 mg-26 mg Tablet) 1 Each Tablet, 1 EACH PO BID 

for 30 Days, TAB


   Prov:PEDRO LUIS ROBERSON MD         6/13/22


Carvedilol (CARVEDILOL) 6.25 Mg Tablet, 6.25 MG PO BID for 30 Days, TAB


   Prov:PEDRO LUIS ROBERSON MD         6/13/22


Amiodarone Hcl (CORDARONE) 200 Mg Tablet, 200 MG PO BID for 30 Days, TAB


   Prov:PEDRO LUIS ROBERSON MD         6/13/22


Potassium Chloride (POTASSIUM CHLORIDE) 20 Meq Tab.er.prt, 20 MEQ PO DAILY24 for

30 Days, #30


   Prov:ZANA NAYAK MD         6/5/22


Apixaban (Eliquis) 5 Mg Tablet, 5 MG PO BID for 30 Days, TAB


   Prov:ZANA NAYAK MD         6/5/22





Past Medical History


Medical History:  hypertension


Surgical History:  cardiac cath





Social History


Alcohol Use:  none


Drug Use:  none





Review of Systems


All Other Systems:  Reviewed and Negative





Physical Exam


General Appearance:  No Apparent Distress


HEENT:  Other (Dry oral mucosa)


Neck:  Normal Inspection


Cardiovascular/Respiratory:  Regular Rate, Rhythm, Normal Peripheral Pulses, 

Normal Breath Sounds, No Respiratory Distress


Gastrointestinal:  Non Tender, Soft


Extremities:  Normal Inspection, No Pedal Edema


Psychiatric:  Alert, Oriented x 3


Motor/Sensory:  No Motor Deficit, No Sensory Deficit


Skin:  Normal Color


Lymphatic:  No Adenopathy





Results/Orders


Results/Orders





Orders - VILLA JAMESON MD


Cbc With Auto Diff (8/1/22 12:25)


Comprehensive Metabolic Panel (8/1/22 12:25)


Probnp    B-Type Np (8/1/22 12:25)


PT (8/1/22 12:25)


Xr Chest 1v (8/1/22 12:25)


Ekg-Routine (8/1/22 12:25)


Troponin I High Sensitivity (8/1/22 12:25)


Covid19 Antigen Clare Maria D (8/1/22 12:29)


Diltiazem Hcl (Cardizem) (8/1/22 12:50)


Diltiazem Hcl (Cardizem) (8/1/22 12:51)


Urinalysis (8/1/22 13:10)


Drug Scrn Med W Confirmation (8/1/22 13:10)


0.9 % Sodium Chloride (Ns 1000ml) (8/1/22 13:55)


0.9 % Sodium Chloride (Ns 1000ml) (8/1/22 13:59)


0.9 % Sodium Chloride (Ns 1000ml) (8/1/22 14:59)





Vital Signs








  Date Time  Temp Pulse Resp B/P (MAP) Pulse Ox O2 Delivery O2 Flow Rate FiO2


 


8/1/22 14:58 98.4 57 18 85/48 (60) 96 Room Air* 0 21


 


8/1/22 12:34 98.4 78 18 116/53 (74) 96 Room Air* 0 21


 


8/1/22 12:34 98.4 78 18     


 


8/1/22 12:34 98.4 78 18  96   


 


6/13/22 09:00  70      








Administered Medications








 Medications


  (Trade)  Dose


 Ordered  Sig/Foreign


 Route


 PRN Reason  Start Time


 Stop Time Status Last Admin


Dose Admin


 


 Sodium Chloride  1,000 ml @ 


 0 mls/hr  Q0M STAT


 IV


   8/1/22 13:55


 8/1/22 13:56 DC 8/1/22 14:03


1,200 MLS/HR








                                Laboratory Tests








Test


 8/1/22


00:00 8/1/22


12:30 8/1/22


12:39


 


Urine Collection Type RANDOM    


 


Urine Color YELLOW    


 


Urine Appearance CLEAR    


 


Urine Bilirubin


 NEGATIVE


(NEGATIVE) 


 





 


Urine Ketones


 NEGATIVE


(NEGATIVE) 


 





 


Urine Specific Gravity


 1.010


(1.005-1.030) 


 





 


Urine pH 5.0 (4.5-8.0)    


 


Urine Protein


 NEGATIVE


(NEGATIVE) 


 





 


Urine Urobilinogen


 0.2 E.U./dL


(0.2) 


 





 


Urine Nitrate


 NEGATIVE


(NEGATIVE) 


 





 


Urine Leukocyte Esterase


 NEGATIVE


(NEGATIVE) 


 





 


Urine Glucose (Auto)(UA)


 100 mg/dL


(NEGATIVE)  H 


 





 


Urine Blood


 NEGATIVE


(NEGATIVE) 


 





 


Urine Opiates Screen


 NEGATIVE


(c/o300ng/mL) 


 





 


Urine Methadone Screen


 NEGATIVE


(c/o300ng/mL) 


 





 


Urine Barbiturates Screen


 NEGATIVE


(c/o200ng/mL) 


 





 


Urine Phencyclidine Screen


 NEGATIVE (c/o


25ng/mL) 


 





 


Ur


Amphetamine/Methamphetamine NEGATIVE


(hp0810ry/mL) 


 





 


Urine MDMA Screen (Ecstasy)


 NEGATIVE


(c/o300ng/mL) 


 





 


Urine Benzodiazepines Screen


 NEGATIVE


(c/o200ng/mL) 


 





 


Urine Cocaine Metabolite


Screen NEGATIVE


(c/o300ng/mL) 


 





 


Ur Tetrahydrocannabinol (THC)


Scrn NEGATIVE (c/o


50ng/mL) 


 





 


White Blood Count


 


 6.0 10^3/uL


(4.5-11.0) 





 


Red Blood Count


 


 5.73 10^6/uL


(4.50-5.90) 





 


Hemoglobin


 


 17.4 g/dL


(13.9-16.3)  H 





 


Hematocrit


 


 50.2 %


(37.0-53.0) 





 


Mean Corpuscular Volume


 


 87.6 fL


() 





 


Mean Corpuscular Hemoglobin


 


 30.4 pg


(26-34) 





 


Mean Corpuscular Hemoglobin


Concent 


 34.7 g/dL


(33-36.5) 





 


Red Cell Distribution Width


 


 16.8 %


(11.5-14.5)  H 





 


Platelet Count


 


 194 10^3/uL


(150-400) 





 


Mean Platelet Volume


 


 9.1 fL


(7.8-11.0) 





 


Neutrophils (%) (Auto)


 


 56.5 %


(41.0-85.0) 





 


Lymphocytes (%) (Auto)


 


 31.9 %


(24.0-44.0) 





 


Monocytes (%) (Auto)


 


 10.6 %


(5.0-12.0) 





 


Neutrophils # (Auto)


 


 3.4 10^3/uL


(1.8-7.7) 





 


Lymphocytes # (Auto)


 


 1.90 10^3/uL1


(1.0-4.8) 





 


Monocytes # (Auto)


 


 0.6 10^3/uL


(0.3-0.8) 





 


Absolute Immature Granulocyte


(auto 


 0.01 10^3 u/L


(0-2) 





 


Absolute Eosinophils (auto)


 


 0.0 10^3/uL


(0.0-0.2) 





 


Immature Granulocytes %


 


 0.20 %


(0.00-0.50) 





 


Eosinophils %


 


 0.5 %


(0.0-5.0) 





 


Basophils %


 


 0.3 %


(0.0-0.2)  H 





 


Basophils #


 


 0.0 10^3/uL


(0.0-0.1) 





 


Prothrombin Time


 


 11.4 SEC


(9.1-11.5) 





 


Prothrombin Time INR


(Non-Therap) 


 1.1  


 





 


Sodium Level


 


 126 mmol/L


(132-145)  #L 





 


Potassium Level


 


 4.6 mmol/L


(3.6-5.2) 





 


Chloride Level


 


 89.0 mmol/L


()  L 





 


Carbon Dioxide Level


 


 21.1 mmol/L


(20.0-32) 





 


Anion Gap  20.5   


 


Blood Urea Nitrogen


 


 115 mg/dL


(7-18)  *H 





 


Creatinine


 


 4.11 mg/dL


(0.59-1.40)  *H 





 


Estimated GFR (


American) 


 19.5 (>/=60)  


 





 


Est GFR (CKD-EPI)(Non-Afr


American) 


 16.1 (>/=60)  


 





 


BUN/Creatinine Ratio  27.0   


 


Glucose Level


 


 143 mg/dL


()  H 





 


Calcium Level


 


 10.1 mg/dL


(8.4-10.5) 





 


Total Bilirubin


 


 2.8 mg/dL


(0.2-1.0)  H 





 


Aspartate Amino Transferase


(AST) 


 76 U/L (0-35)


H 





 


Alanine Aminotransferase (ALT)


 


 42 U/L (12-78)


 





 


Alkaline Phosphatase


 


 149 U/L


()  H 





 


Troponin I High Sensitivity


 


 17 ng/L (0-75)


 





 


Pro-B-Type Natriuretic Peptide


 


 271 pg/mL


(0-125)  H 





 


Total Protein


 


 8.9 g/dL


(6.4-8.2)  H 





 


Albumin


 


 4.3 g/dL


(3.4-5.0) 





 


Globulin  4.6   


 


Albumin/Globulin Ratio  0.934   


 


SARS-CoV-2 Antigen (Rapid)


 


 


 NEGATIVE


(NEGATIVE)











Progress


Progress


EKG shows sinus rhythm, rate of 73, normal axis, normal ST segment





He has elevated creatinine at 4, BUN of 115, potassium normal, normal EKG.  This

that he is profoundly dehydrated.  Started IV fluid resuscitation.  Admit for 

RYAN





EKG/XRAY/CT/US


EKG:  NSR





ER DEPARTURE


Departure


Time of Disposition:  15:05


Disposition:  09 ADMITTED AS INPATIENT


Impression:  


   Primary Impression:  


   RYAN (acute kidney injury)


Condition:  Stable


Referrals:  


PCP,UNKNOWN (PCP)


PRIMARY CARE PROVIDER


Duration or Time Spent with Pa:  10m





Justification of Admit/Observ


Is this patient coming directl:  No


*Level of Care/Services Provid:  ER


Admit Criteria Met:  YES


Justification Content











JUSTIFICATION FOR ADMISSION                     





Instructions





1. Open link in [employer] Browser.   [email or web address] 


2. Copy and paste data needed to meet the Admit Criteria. 


3. Modify and document the needful data to meet the Admit Criteria.











VILLA JAMESON MD             Aug 1, 2022 15:06

## 2022-08-02 VITALS — SYSTOLIC BLOOD PRESSURE: 114 MMHG | DIASTOLIC BLOOD PRESSURE: 64 MMHG

## 2022-08-02 VITALS — SYSTOLIC BLOOD PRESSURE: 103 MMHG | DIASTOLIC BLOOD PRESSURE: 64 MMHG

## 2022-08-02 VITALS — DIASTOLIC BLOOD PRESSURE: 60 MMHG | SYSTOLIC BLOOD PRESSURE: 104 MMHG

## 2022-08-02 VITALS — SYSTOLIC BLOOD PRESSURE: 102 MMHG | DIASTOLIC BLOOD PRESSURE: 66 MMHG

## 2022-08-02 VITALS — SYSTOLIC BLOOD PRESSURE: 120 MMHG | DIASTOLIC BLOOD PRESSURE: 68 MMHG

## 2022-08-02 VITALS — DIASTOLIC BLOOD PRESSURE: 62 MMHG | SYSTOLIC BLOOD PRESSURE: 102 MMHG

## 2022-08-02 LAB
BILIRUB UR STRIP.AUTO-MCNC: NEGATIVE MG/DL
UROBILINOGEN UR QL STRIP.AUTO: 0.2 E.U./DL

## 2022-08-02 RX ADMIN — PANTOPRAZOLE SODIUM SCH MG: 40 TABLET, DELAYED RELEASE ORAL at 09:00

## 2022-08-02 RX ADMIN — SODIUM CHLORIDE SCH MLS/HR: 0.9 INJECTION, SOLUTION INTRAVENOUS at 23:48

## 2022-08-02 RX ADMIN — LEVOTHYROXINE SODIUM SCH MCG: 25 TABLET ORAL at 06:16

## 2022-08-02 RX ADMIN — SODIUM CHLORIDE SCH MLS/HR: 0.9 INJECTION, SOLUTION INTRAVENOUS at 00:00

## 2022-08-02 RX ADMIN — SODIUM CHLORIDE SCH MLS/HR: 0.9 INJECTION, SOLUTION INTRAVENOUS at 08:00

## 2022-08-02 RX ADMIN — SODIUM CHLORIDE SCH MLS/HR: 0.9 INJECTION, SOLUTION INTRAVENOUS at 16:04

## 2022-08-02 NOTE — DIREP
PROCEDURE:US KIDNEYS-BILAT

 

COMPARISON:[facility], CT, CT ABD/PELVIS W/O, 05/30/2017, 07:31 

AM.

 

INDICATIONS:RENAL FUNCTION - CREATINE AND BUN LEVELS

 

TECHNIQUE:Ultrasound examination was performed of the kidneys and bladder.

 

FINDINGS:

 

RIGHT KIDNEY:10.4 x 5.5 x 6.0 cm. Cortex: 2.3 cm

LEFT KIDNEY: 12.0 x 5.4 x 6.0 cm. Cortex: 1.7 cm

 

BLADDER (pre-void):5.8 x 5.8 x 5.9 cm. Volume 103.9 ml

BLADDER (post-void): 2.3 x 2.1 x 2.7 cm. Volume 6.8 ml

MICTURATED VOLUME: 97.1 ml

 

RIGHT KIDNEY: There is no hydronephrosis or suspicious cortical lesion.  

LEFT KIDNEY: There is no hydronephrosis or suspicious cortical lesion.  

BLADDER:Bilateral ureteral jets are visualized.  No visible wall thickening, 

mass, or calculus.  

OTHER:Negative.

 

 

CONCLUSION:No abnormality noted.

 

 

 

Dictated by: [facility] Physician on 08/02/2022 at 01:39 PM     

Electronically Signed By: TANIKA Mccracken M.D. on 08/02/2022 at 01:53 PM   

   

 

 

 

 

henri

## 2022-08-03 VITALS — SYSTOLIC BLOOD PRESSURE: 109 MMHG | DIASTOLIC BLOOD PRESSURE: 71 MMHG

## 2022-08-03 VITALS — SYSTOLIC BLOOD PRESSURE: 103 MMHG | DIASTOLIC BLOOD PRESSURE: 57 MMHG

## 2022-08-03 VITALS — DIASTOLIC BLOOD PRESSURE: 88 MMHG | SYSTOLIC BLOOD PRESSURE: 141 MMHG

## 2022-08-03 VITALS — DIASTOLIC BLOOD PRESSURE: 41 MMHG | SYSTOLIC BLOOD PRESSURE: 93 MMHG

## 2022-08-03 VITALS — SYSTOLIC BLOOD PRESSURE: 114 MMHG | DIASTOLIC BLOOD PRESSURE: 74 MMHG

## 2022-08-03 VITALS — DIASTOLIC BLOOD PRESSURE: 64 MMHG | SYSTOLIC BLOOD PRESSURE: 111 MMHG

## 2022-08-03 LAB
CO2 BLDA-SCNC: 21.4 MMOL/L (ref 20–32)
GLUCOSE PRE 100 G GLC PO SERPL-MCNC: 139 MG/DL (ref 70–110)

## 2022-08-03 RX ADMIN — SODIUM CHLORIDE SCH MLS/HR: 0.9 INJECTION, SOLUTION INTRAVENOUS at 08:26

## 2022-08-03 RX ADMIN — PANTOPRAZOLE SODIUM SCH MG: 40 TABLET, DELAYED RELEASE ORAL at 08:26

## 2022-08-03 RX ADMIN — SODIUM CHLORIDE SCH MLS/HR: 0.9 INJECTION, SOLUTION INTRAVENOUS at 08:29

## 2022-08-03 RX ADMIN — LEVOTHYROXINE SODIUM SCH MCG: 25 TABLET ORAL at 05:39

## 2022-08-03 RX ADMIN — SODIUM CHLORIDE SCH MLS/HR: 0.9 INJECTION, SOLUTION INTRAVENOUS at 15:27

## 2022-08-03 RX ADMIN — SODIUM CHLORIDE SCH MLS/HR: 0.9 INJECTION, SOLUTION INTRAVENOUS at 23:39

## 2022-08-03 NOTE — PNH
DATE: 08/02/2022

DICTATOR NAME: Daylin Macias MD

SUBJECTIVE:  The patient is urinating.  He had no urine for 2 days and came in 

acute renal failure, dehydration worsened by Farxiga and he had oliguria with 

underlying polycythemia with a hemoglobin of 17.4, probably related to 

hemoconcentration where it has improved to 14.7 today, not real polycythemia and

his BUN was 115, is down to 109 and a creatinine of 4.08, but this was done 

yesterday, it should have been done this morning and this morning labs are not 

done so will be done tomorrow and ER physician has written an order on Lovenox 1

mg subcutaneous b.i.d., which should not have been given with the renal failure 

and there is no need to do Lovenox at the present time.  He is in acute renal 

failure.  We will do a renal sonogram and UA and repeat renal profile in the 

morning.



IMPRESSION:  Acute renal failure, dehydration, history of cardiomyopathy, atrial

fibrillation, hypertension and hypertensive heart disease.



PLAN:  At this time continue same optimized medical therapy with IV fluids and 

all medications have been held at the present time.









Daylin Macias MD

DR: ANTOINE/QUEENIE/RUTHY TID: [id number] RECEIPT: [id number]

DD: 08/02/2022 01:25 PM

DT: 08/03/2022 06:40 AM

## 2022-08-04 VITALS — SYSTOLIC BLOOD PRESSURE: 134 MMHG | DIASTOLIC BLOOD PRESSURE: 63 MMHG

## 2022-08-04 VITALS — DIASTOLIC BLOOD PRESSURE: 77 MMHG | SYSTOLIC BLOOD PRESSURE: 121 MMHG

## 2022-08-04 VITALS — DIASTOLIC BLOOD PRESSURE: 89 MMHG | SYSTOLIC BLOOD PRESSURE: 130 MMHG

## 2022-08-04 LAB
CO2 BLDA-SCNC: 20 MMOL/L (ref 20–32)
GLUCOSE PRE 100 G GLC PO SERPL-MCNC: 98 MG/DL (ref 70–110)

## 2022-08-04 RX ADMIN — PANTOPRAZOLE SODIUM SCH MG: 40 TABLET, DELAYED RELEASE ORAL at 09:13

## 2022-08-04 RX ADMIN — SODIUM CHLORIDE SCH MLS/HR: 0.9 INJECTION, SOLUTION INTRAVENOUS at 07:41

## 2022-08-04 RX ADMIN — LEVOTHYROXINE SODIUM SCH MCG: 25 TABLET ORAL at 05:44

## 2022-08-04 NOTE — DSH
DATE OF DISCHARGE: 08/04/2022

DICTATOR NAME: Daylin Macias MD

FINAL DIAGNOSES:  Syncope, hypotension, acute renal failure, prerenal azotemia, 

dehydration, hypovolemia and nonischemic dilated cardiomyopathy, chronic 

systolic heart failure, paroxysmal atrial fibrillation, obesity, hypothyroidism.

 Please refer to my history and physical to the point of my impression.



HOSPITAL COURSE:  The patient is a 41-year-old white male with known history of 

nonischemic dilated cardiomyopathy.  He had a 20% ejection fraction and was in 

atrial fibrillation with rapid ventricular response and about 7-8 weeks ago, was

cardioverted after optimization of medical therapy and he returned back into 

regular sinus rhythm and has done well.  EF went up to 40% and he has been 

working very hard outdoors and he had decreased urine output for 2 days and went

into acute renal failure with very severe oliguria and he was hemoconcentrated 

with a hemoglobin of 17.4, which came down to 14.7.  His chemistry showed 115 

BUN with a creatinine of 4.11, which has come down to 39 BUN and 1.7 creatinine,

and his urine was unremarkable, and renal sonogram showed normal-sized kidneys. 

COVID test was negative ;and at the present time, I have held several of his 

medications and will come to the clinic next week for a followup renal profile 

and he is on Eliquis 5 mg twice a day, Coreg 6.25 mg twice a day, Levothroid 25 

mcg once a day, Protonix 40 mg once a day, Verquvo 2.5 mg once a day.  I stopped

his amiodarone at this time, Lipitor 40 mg once a day. And stopped his Farxiga, 

potassium, Entresto, Aldactone, torsemide and we will gradually resume all his 

medications once his kidney function has improved to normal.









Daylin Macias MD

DR: ANTOINE/ALEKSANDRA/VIS TID: [id number] RECEIPT: [id number]

DD: 08/04/2022 11:21 AM

DT: 08/04/2022 12:50 PM

## 2022-08-04 NOTE — DIET.OP
Nutrition Asmt/Malnutrit 2-17


Actual Date of Review:  Aug 4, 2022


Actual Time Reviewed Asmt:  15:46


Nutritional Screening:  Malnutr/Diet Consult


Diagnosis:  Acute Renal Failure, chornic systolic HF


Pertinent Medical Hx/Surgical:  


GERD, Sleep Apnea, Hyperlipidemia, HTN, Anxiety, Depression, Hx


alcoholism, CHF


Subjective Information:  


42 yo m, presented syncope, collapsed, passed out.  weight 258#,


current wt 226#, 32# decrease x 2 months, 12%, significant.


Current Diet Order/Nutrition S:  Cardiac


Patient /S.O:  Not Indicated


Pertinent Meds


Zofran, Levothyroxine, Protonix


Pertinent Labs


Hgb 14.7, Hct 43.6, BUN  39H, Cr 1.07H, Na+ 134, K+ 4.3, Glu 98, Ca+ 8.3


Height (Inches):  69


Current Weight:  226


%IBW:  140


Recent Weight Change:  Yes (32# loss x 2 months, 12%, significant)


Weight Status:  Obese


GI Symptoms:  None


Cultural/Ethnic/Restorationism Michelle:  


unknown


Usual Diet at Home:  unable to obtain


Skin Integrity/Comment:  Yes (skin intact)


Current %PO:  Good(%) (100% x 7 meals)


Calories/Kcals/K-14


Kcals Calculated:  1930-7976


Protein:  Use Current Weight


Protein g/k.0-1.3


Protein Calculated:  113-133


Fluid: ml:  6632-1256 or per MD order


Nutritional Problem:  Nutr. Problems Present


Problems:  


Overweight/Obesity


Etiology:  


Excessive Energy Intake


Signs/Symptoms:  


BMI above normative for age gender, obesity 33


Interpretation of Weight Loss:  Up to 7.5% in 3 Months (12% x 2 months, 32# 

loss)


Fluid Accumulation    (N/A):  N/A


Reduced  Strength   (N/A):  N/A


Protein-Calorie Malnutrition:  N/A


Is there a minimum of two crit:  No


Malnutrition Related to Morbid:  No


RD Comments:


Wt loss significant x 2 months, PO intake 100%, patient being discharged today.


Expected Outcomes


stable w/adequate hydration, skin intgegrity, labs WNL, nutrition needs met 

within 3 days.


Malnutrtion/Nutrition Risk Edu:  No


low nutrition risk


RD Follow-Up Date:  Aug 11, 2022


MD Notificiation Needed?:  No











GRACIA AG                   Aug 4, 2022 15:53

## 2022-08-05 NOTE — PNH
DATE: 08/03/2022

DICTATOR NAME: Daylin Macias MD

SUBJECTIVE:  The patient is doing well.  His appetite is improved.  Overall, he 

is doing well.



OBJECTIVE:

VITAL SIGNS:  Stable.  Temperature 98.9, pulse 62, respirations 18, and blood 

pressure 111/64, 98% saturation on room air.



He has had fairly good urine output, almost 3550, came with acute renal failure.

 I think he is on a diuretic phase at the present time, probably this was 

related to dehydration along with Farxiga.  Has history of nonischemic dilated 

cardiomyopathy, chronic systolic heart failure, which was 20% with atrial 

fibrillation after cardioversion, went into regular sinus rhythm and AV 

synchrony restored and his LV ejection fraction improved to 40-44%, but he was 

on polytherapy and he has been working outside, which have lead to prerenal 

azotemia and dehydration and acute renal failure.  His creatinine has come down 

from 4.11-2.11 and BUN is down from 115-63.  Electrolytes are normal.  Repeat 

labs will be done in the morning.  All medications have been held.



IMPRESSION:  Acute renal failure, improved status; kidney size is normal on 

sonogram; nonischemic dilated cardiomyopathy; chronic systolic heart failure; 

atrial fibrillation.



PLAN:  At this time, continue same optimized medical therapy.  Discharge 

planning on 08/04/2022.









Daylin Macias MD

DR: ANTOINE/JOSE/MARISABEL TID: [id number] RECEIPT: [id number]

DD: 08/03/2022 03:52 PM

DT: 08/04/2022 02:12 AM

## 2022-08-11 ENCOUNTER — HOSPITAL ENCOUNTER (OUTPATIENT)
Dept: HOSPITAL 65 - NPLAB | Age: 42
Discharge: HOME | End: 2022-08-11
Attending: SPECIALIST
Payer: SELF-PAY

## 2022-08-11 DIAGNOSIS — I42.9: ICD-10-CM

## 2022-08-11 DIAGNOSIS — I50.22: ICD-10-CM

## 2022-08-11 DIAGNOSIS — N18.9: ICD-10-CM

## 2022-08-11 DIAGNOSIS — I13.0: Primary | ICD-10-CM

## 2022-08-11 LAB
CO2 BLDA-SCNC: 27.1 MMOL/L (ref 20–32)
GLUCOSE PRE 100 G GLC PO SERPL-MCNC: 87 MG/DL (ref 70–110)

## 2022-08-11 PROCEDURE — 83735 ASSAY OF MAGNESIUM: CPT

## 2022-08-11 PROCEDURE — 80053 COMPREHEN METABOLIC PANEL: CPT

## 2022-08-11 PROCEDURE — 36415 COLL VENOUS BLD VENIPUNCTURE: CPT

## 2023-07-05 NOTE — NUR
DISCHARGE FOLLOW UP



CM WENT THROUGH PROCESS FOR OF ACTIVATING [employer] 30 DAY FREE COUPON CARD WITH PATIENT.  THE 
CARD IS NOW ACTIVATED AND HE KNOWS TO TAKE IT TO [facility] TO  HIS MEDICATIONS.  HIS 
MEDICATION FOR LISINOPRIL, COREG, AND AMIODARONE WILL BE $16.  PATIENT STATED, "THAT ISN'T 
TOO BAD.  I CAN DO THAT."  LYNSEY SARGENT RN STATED SHE WOULD CALL THE MEDICATIONS INTO 
[facility] IN [place] FOR PT.  CM ALSO WENT THROUGH PROCESS OF COMPLETING THE PATIENT 
APPLICATION FOR LONG [facility] PATIENT ASSISTANCE PROGRAM AND LEFT CONTACT INFORMATION IF 
HE NEEDED ASSISTANCE AFTER DISCHARGE.  PATIENT VOICED UNDERSTANDING.  FOLLOW UP APPOINTMENT 
WAS MADE BY TION WITH DR. MCGRATH FOR WEDNESDAY, DECEMBER 30TH, AND DOCUMENTED IN NOTES.  CM 
ALSO SPOKE WITH DIANNE BRADLEY  FOR PM TO SET UP PAYMENT PLAN FOR PATIENT 
CONCERNING CARDIOLOGY OFFICE VISITS.  DENIES FURTHER QUESTIONS AT THIS TIME.  CM WILL 
CONTINUE TO FOLLOW FOR DISCHARGE NEEDS. .

## 2023-07-19 ENCOUNTER — HOSPITAL ENCOUNTER (EMERGENCY)
Dept: HOSPITAL 65 - ER | Age: 43
Discharge: HOME | End: 2023-07-19
Payer: SELF-PAY

## 2023-07-19 VITALS — DIASTOLIC BLOOD PRESSURE: 95 MMHG | SYSTOLIC BLOOD PRESSURE: 161 MMHG

## 2023-07-19 VITALS — BODY MASS INDEX: 32.21 KG/M2 | WEIGHT: 225 LBS | HEIGHT: 70 IN

## 2023-07-19 DIAGNOSIS — I10: ICD-10-CM

## 2023-07-19 DIAGNOSIS — E87.6: ICD-10-CM

## 2023-07-19 DIAGNOSIS — M77.9: ICD-10-CM

## 2023-07-19 DIAGNOSIS — M25.521: Primary | ICD-10-CM

## 2023-07-19 LAB
BASOPHILS # BLD AUTO: 0.1 10^3/UL (ref 0–0.1)
BASOPHILS NFR BLD AUTO: 0.4 % (ref 0–0.2)
CO2 BLDA-SCNC: 22.2 MMOL/L (ref 20–32)
EOSINOPHIL # BLD AUTO: 0.2 10^3/UL (ref 0–0.2)
EOSINOPHIL NFR BLD AUTO: 1.3 % (ref 0–5)
ERYTHROCYTE [DISTWIDTH] IN BLOOD BY AUTOMATED COUNT: 13 % (ref 11.5–14.5)
GLUCOSE PRE 100 G GLC PO SERPL-MCNC: 114 MG/DL (ref 74–106)
LYMPHOCYTES # BLD AUTO: 3.43 10^3/UL1 (ref 1–4.8)
LYMPHOCYTES NFR BLD AUTO: 24.3 % (ref 24–44)
MCH RBC QN AUTO: 32 PG (ref 26–34)
MONOCYTES # BLD AUTO: 1.1 10^3/UL (ref 0.3–0.8)
MONOCYTES NFR BLD AUTO: 8 % (ref 5–12)
NEUTROPHILS # BLD AUTO: 9.3 10^3/UL (ref 1.8–7.7)
NEUTROPHILS NFR BLD AUTO: 65.9 % (ref 41–85)
PLATELET # BLD AUTO: 300 10^3/UL (ref 150–400)

## 2023-07-19 PROCEDURE — 84550 ASSAY OF BLOOD/URIC ACID: CPT

## 2023-07-19 PROCEDURE — 73070 X-RAY EXAM OF ELBOW: CPT

## 2023-07-19 PROCEDURE — 84145 PROCALCITONIN (PCT): CPT

## 2023-07-19 PROCEDURE — 86140 C-REACTIVE PROTEIN: CPT

## 2023-07-19 PROCEDURE — 85025 COMPLETE CBC W/AUTO DIFF WBC: CPT

## 2023-07-19 PROCEDURE — 36415 COLL VENOUS BLD VENIPUNCTURE: CPT

## 2023-07-19 PROCEDURE — 80048 BASIC METABOLIC PNL TOTAL CA: CPT

## 2023-07-19 PROCEDURE — 99284 EMERGENCY DEPT VISIT MOD MDM: CPT

## 2023-07-19 PROCEDURE — 96372 THER/PROPH/DIAG INJ SC/IM: CPT

## 2023-07-19 PROCEDURE — 85651 RBC SED RATE NONAUTOMATED: CPT

## 2023-07-19 NOTE — DIREP
PROCEDURE:XRAY ELBOW [id number]

 

COMPARISON:None.

 

INDICATIONS:pain

 

FINDINGS:

No acute displaced fracture or dislocation.

 

Small joint effusion.

 

CONCLUSION:

Small joint effusion which may reflect radiographically occult fracture in the 

setting of trauma.

 

 

Dictated by: Nghia Owen DO on 07/19/2023 at 01:08 PM 

Electronically Signed By: Nghia Owen DO on 07/19/2023 at 01:13 PM

## 2023-07-19 NOTE — ER.PDOC
General


Chief Complaint:  Extremities


Stated Complaint:  RIGHT ARM INJURY


Time seen by MD:  12:46


Source:  patient


Exam Limitations:  no limitations





History of Present Illness


Initial Comments


Right elbow pain for several weeks worse today.  No fever or chills.  He denies 

injury.  Pain is worse with movement.


Severity:  moderate


Exacerbated By:  movement of


Relieved By:  nothing


Quality:  pain, tenderness


Allergies:  


Coded Allergies:  


     No Known Allergies (Unverified , 12/17/16)


Home Meds


Active Scripts


Levothyroxine Sodium (LEVOTHYROXINE SODIUM) 25 Mcg Tablet, 25 MCG PO ACB for 30 

Days, TAB


   Prov:PEDRO LUIS ROBERSON MD         6/13/22


Pantoprazole Sodium (PROTONIX) 40 Mg Tablet.dr, 40 MG PO DAILY for 30 Days


   Prov:PEDRO LUIS ROBERSON MD         6/13/22


Vericiguat (Verquvo) 2.5 Mg Tablet, 2.5 MG PO DAILY for 30 Days, TAB


   Prov:PEDRO LUIS ROBERSON MD         6/13/22


Carvedilol (CARVEDILOL) 6.25 Mg Tablet, 6.25 MG PO BID for 30 Days, TAB


   Prov:PEDRO LUIS ROBERSON MD         6/13/22


Apixaban (Eliquis) 5 Mg Tablet, 5 MG PO BID for 30 Days, TAB


   Prov:ZANA NAYAK MD         6/5/22





Past Medical History


Medical History:  arrhythmia, cardiac problems, hypertension, vascular disease


Surgical History:  no surgical history





Family History


Significant Family History:  no pertinent family hx





Social History


Smoking:  non-smoker


Alcohol Use:  none


Drug Use:  none





Review of Systems


Constitutional:  no symptoms reported


EENTM:  no symptoms reported


Respiratory:  no symptoms reported


Cardiovascular:  no symptoms reported


Gastrointestinal:  no symptoms reported


Musculoskeletal:  see HPI


All Other Systems:  Reviewed and Negative





Physical Exam


General Appearance:  alert, no distress


Upper Extremity:  tenderness (Right elbow without swelling.  No redness or 

erythema.  Not warm to touch.)


Skin:  color nml, warm/dry


Vascular:  no vascular compromise


Neuro/Psych:  sensation nml, motor nml


Central Exam:  oriented X3, CN's nml as tested, nml speech, nml cognition, nml 

mood/affect


EENT:  eyes nml inspection


Neck/Back:  nml inspection


Respiratory:  no resp distress, breath sounds nml


CVS:  reg rate & rhythm, heart sounds nml


Abdomen:  non-tender, no organomegaly, nml bowels sounds





Results/Orders


Results/Orders





Orders - EDDI BROWN MD


Xr Elbow Rt (7/19/23 12:44)


Cbc With Auto Diff (7/19/23 12:44)


Basic Metabolic Panel (7/19/23 12:44)


Erythrocyte Sedimentation Rate (7/19/23 12:44)


C-Reactive Protein (7/19/23 12:44)


Procalcitonin (7/19/23 12:44)


Uric Acid (7/19/23 12:44)


Ketorolac Tromethamine (Toradol) (7/19/23 12:44)


Ketorolac Tromethamine (Toradol) (7/19/23 12:47)


Triamcinolone Acetonide (Kenalog-40) (7/19/23 14:29)


Potassium Chloride (Klor-Con 10) (7/19/23 14:29)





Vital Signs








  Date Time  Temp Pulse Resp B/P (MAP) Pulse Ox O2 Delivery O2 Flow Rate FiO2


 


7/19/23 12:32 98.2 89 20     


 


7/19/23 12:32 98.2 89 20 161/95 (117) 100 Room Air* 0 21


 


7/19/23 12:32 98.2 89 20  100   








Administered Medications








 Medications


  (Trade)  Dose


 Ordered  Sig/Foreign


 Route


 PRN Reason  Start Time


 Stop Time Status Last Admin


Dose Admin


 


 Ketorolac


 Tromethamine


  (Toradol)  60 mg  STAT  STAT


 IM


   7/19/23 12:44


 7/19/23 12:47 DC 7/19/23 12:50


60 MG








                                Laboratory Tests








Test


 7/19/23


13:25


 


White Blood Count


 14.1 10^3/uL


(4.5-11.0)  H


 


Red Blood Count


 5.32 10^6/uL


(4.50-5.90)


 


Hemoglobin


 17.0 g/dL


(13.9-16.3)  H


 


Hematocrit


 49.9 %


(37.0-53.0)


 


Mean Corpuscular Volume


 93.8 fL


()


 


Mean Corpuscular Hemoglobin


 32.0 pg


(26-34)


 


Mean Corpuscular Hemoglobin


Concent 34.1 g/dL


(33-36.5)


 


Red Cell Distribution Width


 13.0 %


(11.5-14.5)


 


Platelet Count


 300 10^3/uL


(150-400)


 


Mean Platelet Volume


 9.3 fL


(7.8-11.0)


 


Neutrophils (%) (Auto)


 65.9 %


(41.0-85.0)


 


Lymphocytes (%) (Auto)


 24.3 %


(24.0-44.0)


 


Monocytes (%) (Auto)


 8.0 %


(5.0-12.0)


 


Neutrophils # (Auto)


 9.3 10^3/uL


(1.8-7.7)  H


 


Lymphocytes # (Auto)


 3.43 10^3/uL1


(1.0-4.8)


 


Monocytes # (Auto)


 1.1 10^3/uL


(0.3-0.8)  H


 


Absolute Immature Granulocyte


(auto 0.02 10^3 u/L


(0-2)


 


Absolute Eosinophils (auto)


 0.2 10^3/uL


(0.0-0.2)


 


Immature Granulocytes %


 0.10 %


(0.00-0.50)


 


Eosinophils %


 1.3 %


(0.0-5.0)


 


Basophils %


 0.4 %


(0.0-0.2)  H


 


Basophils #


 0.1 10^3/uL


(0.0-0.1)


 


Erythrocyte Sedimentation Rate


 4 mm/hr (0-20)





 


Sodium Level


 138 mmol/L


(132-145)


 


Potassium Level


 2.9 mmol/L


(3.6-5.2)  L


 


Chloride Level


 103.0 mmol/L


()


 


Carbon Dioxide Level


 22.2 mmol/L


(20.0-32)


 


Glucose Level


 114 mg/dL


()  H


 


Blood Urea Nitrogen


 10 mg/dL


(7-18)


 


Creatinine


 1.19 mg/dL


(0.59-1.40)


 


Calcium Level


 9.6 mg/dL


(8.4-10.5)


 


Anion Gap 15.7  


 


Estimated GFR (


American) 81.1 (>/=60)  





 


Est GFR (CKD-EPI)(Non-Afr


American) 67.0 (>/=60)  





 


BUN/Creatinine Ratio


 8.0


(10.0-20.0)  L


 


Uric Acid


 6.5 mg/dL


(3.5-7.2)


 


C-Reactive Protein


 0.49 mg/dL


(0.00-5.00)


 


Procalcitonin


 < 0.05 ng/mL


(0.05-0.5)  L











Progress


Progress


X rays right elbow: Small joint effusion which may reflect radiographically 

occult fracture in the 


setting of trauma.





WBC: 14.1, ESR normal, CRP normal, Procalcitonin normal, Potassium: 2.9





Patient received Toradol, Kenalog and Potassium. He is feeling better.





I consulted Dr. Lentz. He said patient should go home with an arm sling and 

steroid. Antibiotic added since WBC is high even though inflammatory markers and

Procal are normal. Patient given a sling.





Patient told me that he has Potassium at home but stopped taking it.





ER DEPART


Departure


Time of Disposition:  14:40


Disposition:  01 HOME / SELF CARE / HOMELESS


Impression:  


   Primary Impression:  


   Elbow pain, right


   Additional Impressions:  


   Tendonitis of elbow, right


   Hypokalemia


Condition:  Improved


Referrals:  


PCP,UNKNOWN (PCP)


PRIMARY CARE PROVIDER





Additional Instructions:  


Prednisone


Bactrim DS


Continue with your Potassium at home


F/U with Dr. Lentz on 7/21/23. Call for appointment


Return to ED if worsening or concerns


Duration or Time Spent with Pa:  30 min





Problem Qualifiers











EDDI BROWN MD                Jul 19, 2023 12:47

## 2024-10-27 NOTE — PCM.EKG
[facility]

                                       

Test Date:    2019               Test Time:    13:52:12

Pat Name:     SAMUEL COLLINS            Department:   

Patient ID:   [id number]          Room:         [id number]

Gender:       M                        Technician:   TB

:          1980               Requested By: BROOKLYN MCGRATH

Order Number: [id number]           Reading MD:     

                                 Measurements

Intervals                              Axis          

Rate:         98                       P:            

PA:                                    QRS:          20

QRSD:         89                       T:            29

QT:           359                                    

QTc:          459                                    

                           Interpretive Statements

Atrial fibrillation

Artifact in lead(s) I,III,V2 and baseline wander in lead(s) 

I,II,aVR,aVF,V4,V5,V6

No previous ECG available for comparison



Please click the below link to view image of tracing.
[facility]

                                       

Test Date:    2019               Test Time:    12:41:40

Pat Name:     SAMUEL COLLINS            Department:   

Patient ID:   [id number]          Room:         [id number]

Gender:       M                        Technician:   JODI

:          1980               Requested By: CINDY KIMBROUGH

Order Number: [id number]           Reading MD:   Cindy Kimbrough

                                 Measurements

Intervals                              Axis          

Rate:         172                      P:            

UT:                                    QRS:          46

QRSD:         114                      T:            -28

QT:           307                                    

QTc:          520                                    

                           Interpretive Statements

Atrial fibrillation

Paired ventricular premature complexes

Incomplete left bundle branch block

Low voltage, extremity and precordial leads

Minimal ST elevation, lateral leads

Prolonged QT interval

Baseline wander in lead(s) II,III,aVR,aVF,V6

No previous ECG available for comparison



Electronically Signed On 2019 14:43:03 CST by Cindy Kimbrough



Please click the below link to view image of tracing.
None or intercostal